# Patient Record
Sex: FEMALE | Race: WHITE | Employment: OTHER | URBAN - METROPOLITAN AREA
[De-identification: names, ages, dates, MRNs, and addresses within clinical notes are randomized per-mention and may not be internally consistent; named-entity substitution may affect disease eponyms.]

---

## 2020-11-17 ENCOUNTER — TELEPHONE (OUTPATIENT)
Dept: ADMINISTRATIVE | Age: 50
End: 2020-11-17

## 2020-11-18 ENCOUNTER — HOSPITAL ENCOUNTER (EMERGENCY)
Age: 50
Discharge: HOME | End: 2020-11-18
Payer: COMMERCIAL

## 2020-11-18 VITALS
HEIGHT: 67 IN | RESPIRATION RATE: 16 BRPM | DIASTOLIC BLOOD PRESSURE: 56 MMHG | TEMPERATURE: 97.8 F | BODY MASS INDEX: 38.61 KG/M2 | SYSTOLIC BLOOD PRESSURE: 127 MMHG | HEART RATE: 78 BPM | OXYGEN SATURATION: 97 % | WEIGHT: 246 LBS

## 2020-11-18 PROCEDURE — 99212 OFFICE O/P EST SF 10 MIN: CPT

## 2020-11-18 PROCEDURE — 99202 OFFICE O/P NEW SF 15 MIN: CPT | Performed by: NURSE PRACTITIONER

## 2020-11-18 RX ORDER — BENZTROPINE MESYLATE 0.5 MG/1
0.5 TABLET ORAL 2 TIMES DAILY
Status: ON HOLD | COMMUNITY
End: 2022-06-14 | Stop reason: SDUPTHER

## 2020-11-18 RX ORDER — ERYTHROMYCIN 500 MG/1
500 TABLET, COATED ORAL 4 TIMES DAILY
Qty: 28 TABLET | Refills: 0 | Status: ON HOLD | OUTPATIENT
Start: 2020-11-18 | End: 2020-11-23 | Stop reason: HOSPADM

## 2020-11-18 RX ORDER — ZIPRASIDONE HYDROCHLORIDE 40 MG/1
40 CAPSULE ORAL 2 TIMES DAILY WITH MEALS
Status: ON HOLD | COMMUNITY
End: 2020-11-23 | Stop reason: HOSPADM

## 2020-11-18 RX ORDER — TOPIRAMATE 25 MG/1
25 TABLET ORAL 2 TIMES DAILY
Status: ON HOLD | COMMUNITY
End: 2022-06-14 | Stop reason: HOSPADM

## 2020-11-18 RX ORDER — SERTRALINE HYDROCHLORIDE 100 MG/1
50 TABLET, FILM COATED ORAL DAILY
Status: ON HOLD | COMMUNITY
End: 2022-06-14 | Stop reason: HOSPADM

## 2020-11-18 ASSESSMENT — ENCOUNTER SYMPTOMS
ALLERGIC/IMMUNOLOGIC NEGATIVE: 1
SHORTNESS OF BREATH: 0
VOMITING: 0
SINUS PRESSURE: 0
EYE ITCHING: 0
STRIDOR: 0
COUGH: 1
NAUSEA: 0
CHEST TIGHTNESS: 0
WHEEZING: 0
EYE DISCHARGE: 0
ABDOMINAL PAIN: 0
SORE THROAT: 0
VOICE CHANGE: 0
EYE REDNESS: 0
TROUBLE SWALLOWING: 0
RHINORRHEA: 0

## 2020-11-18 ASSESSMENT — PAIN DESCRIPTION - LOCATION: LOCATION: EAR

## 2020-11-18 ASSESSMENT — PAIN SCALES - GENERAL: PAINLEVEL_OUTOF10: 6

## 2020-11-18 ASSESSMENT — PAIN DESCRIPTION - ORIENTATION: ORIENTATION: RIGHT

## 2020-11-18 NOTE — ED PROVIDER NOTES
Diagnosis Date    Cocaine abuse [facility])        SURGICAL HISTORY     Patient  has a past surgical history that includes Salpingo-oophorectomy and Dilation and curettage of uterus. CURRENT MEDICATIONS       Discharge Medication List as of 11/18/2020 11:42 AM      CONTINUE these medications which have NOT CHANGED    Details   topiramate (TOPAMAX) 25 MG tablet Take 25 mg by mouth 2 times dailyHistorical Med      ziprasidone (GEODON) 40 MG capsule Take 40 mg by mouth 2 times daily (with meals)Historical Med      benztropine (COGENTIN) 0.5 MG tablet Take 0.5 mg by mouth 2 times dailyHistorical Med      sertraline (ZOLOFT) 100 MG tablet Take 100 mg by mouth dailyHistorical Med      albuterol sulfate (PROAIR RESPICLICK) 517 (90 Base) MCG/ACT aerosol powder inhalation Inhale into the lungs every 4 hours as needed for Wheezing or Shortness of BreathHistorical Med             ALLERGIES     Patient is is allergic to pcn [penicillins]; sulfa antibiotics; and tegretol [carbamazepine]. FAMILY HISTORY     Patient'sfamily history is not on file. SOCIAL HISTORY     Patient  reports that she has been smoking cigarettes. She has never used smokeless tobacco. She reports previous alcohol use. She reports previous drug use. PHYSICAL EXAM     ED TRIAGE VITALS  BP: (!) 127/56, Temp: 97.8 °F (36.6 °C), Pulse: 78, Resp: 16, SpO2: 97 %  Physical Exam  Vitals signs and nursing note reviewed. Constitutional:       General: She is not in acute distress. Appearance: Normal appearance. She is well-developed and well-groomed. She is not ill-appearing, toxic-appearing or diaphoretic. HENT:      Head: Normocephalic and atraumatic. Right Ear: Hearing, ear canal and external ear normal. No decreased hearing noted. Tenderness present. No drainage or swelling. No middle ear effusion. There is no impacted cerumen. No mastoid tenderness. No hemotympanum. Tympanic membrane is erythematous and bulging.  Tympanic membrane is not injected or perforated. Left Ear: Hearing, tympanic membrane, ear canal and external ear normal.      Nose: Nose normal. No mucosal edema, congestion or rhinorrhea. Mouth/Throat:      Lips: Pink. No lesions. Mouth: Mucous membranes are moist. No oral lesions. Tongue: No lesions. Palate: No lesions. Pharynx: Oropharynx is clear. Uvula midline. No pharyngeal swelling, oropharyngeal exudate, posterior oropharyngeal erythema or uvula swelling. Tonsils: No tonsillar exudate or tonsillar abscesses. 2+ on the right. 2+ on the left. Eyes:      General: Lids are normal.         Right eye: No discharge. Left eye: No discharge. Conjunctiva/sclera: Conjunctivae normal.      Right eye: Right conjunctiva is not injected. Left eye: Left conjunctiva is not injected. Pupils: Pupils are equal.   Neck:      Musculoskeletal: Normal range of motion. Cardiovascular:      Rate and Rhythm: Normal rate and regular rhythm. Pulmonary:      Effort: Pulmonary effort is normal. No respiratory distress. Breath sounds: Normal breath sounds and air entry. No stridor, decreased air movement or transmitted upper airway sounds. No decreased breath sounds, wheezing, rhonchi or rales. Musculoskeletal:      Right knee: She exhibits normal range of motion. Left knee: She exhibits normal range of motion. Lymphadenopathy:      Head:      Right side of head: No submental, submandibular, tonsillar, preauricular or posterior auricular adenopathy. Left side of head: No submental, submandibular, tonsillar, preauricular or posterior auricular adenopathy. Cervical: Cervical adenopathy present. Right cervical: Deep cervical adenopathy present. No superficial or posterior cervical adenopathy. Left cervical: No superficial, deep or posterior cervical adenopathy. Skin:     General: Skin is warm and dry. Coloration: Skin is not pale. Findings: No rash. Comments: No obvious signs of infection or trauma. Neurological:      Mental Status: She is alert and oriented to person, place, and time. Sensory: No sensory deficit. Psychiatric:         Behavior: Behavior normal. Behavior is cooperative. DIAGNOSTIC RESULTS   Labs:  Abnormal Labs Reviewed - No data to display     IMAGING:  No orders to display     URGENT CARE COURSE:     Vitals:    11/18/20 1119   BP: (!) 127/56   Pulse: 78   Resp: 16   Temp: 97.8 °F (36.6 °C)   TempSrc: Infrared   SpO2: 97%   Weight: 246 lb (111.6 kg)   Height: 5' 6.5\" (1.689 m)       Medications - No data to display  PROCEDURES:  FINALIMPRESSION      1. Non-recurrent acute suppurative otitis media of right ear without spontaneous rupture of tympanic membrane        DISPOSITION/PLAN   DISPOSITION Decision To Discharge 11/18/2020 11:41:20 AM    Exam consistent with Right AOM, no rupture.   Pt to fu if not improving in 72 hours or if symptoms do not resolve  Pt requesting erythromycin as \"this is the only thing that works for her ear infections\"  Pt has been recently diagnosed with covid and states symptoms improving    Good handwashing, self quarantine at home, plenty of fluids, tylenol as needed for pain, fever           Problem List Items Addressed This Visit     None      Visit Diagnoses     Non-recurrent acute suppurative otitis media of right ear without spontaneous rupture of tympanic membrane    -  Primary    Relevant Medications    erythromycin base (E-MYCIN) 500 MG tablet          PATIENT REFERRED TO:  Nile Bonilla DO  [address] [place] [address], [address] [facility] [place] [address] [phone]    Schedule an appointment as soon as possible for a visit in 1 week  If no improvement of symptoms, For appointment       Иван Evans 167, APRN - CNP  11/18/20 5170

## 2020-11-18 NOTE — ED TRIAGE NOTES
Started 3 days ago with a right ear pain with drainage and right neck pain, pcp usually give erythromycin, was tested positive for covid has been sick since 11/4

## 2020-11-18 NOTE — ED NOTES
Pt. Released in stable condition, ambulated per self to private car. Instructed pt to follow-up with family doctor as needed for recheck or go directly to the emergency department for any concerns/worsening conditions. Pt. Verbalized understanding of instructions. No questions at this time. RX in hand.       Yariel Singh RN  11/18/20 7983

## 2020-11-19 ENCOUNTER — HOSPITAL ENCOUNTER (EMERGENCY)
Age: 50
Discharge: PSYCHIATRIC OR SUBSTANCE USE FACILITY | End: 2020-11-20
Attending: EMERGENCY MEDICINE
Payer: COMMERCIAL

## 2020-11-19 ENCOUNTER — TELEPHONE (OUTPATIENT)
Dept: FAMILY MEDICINE CLINIC | Age: 50
End: 2020-11-19

## 2020-11-19 LAB
ACETAMINOPHEN LEVEL: < 5 UG/ML (ref 0–20)
ALBUMIN SERPL-MCNC: 4.2 G/DL (ref 3.5–5.1)
ALP BLD-CCNC: 105 U/L (ref 38–126)
ALT SERPL-CCNC: 12 U/L (ref 11–66)
AMPHETAMINE+METHAMPHETAMINE URINE SCREEN: NEGATIVE
ANION GAP SERPL CALCULATED.3IONS-SCNC: 12 MEQ/L (ref 8–16)
AST SERPL-CCNC: 12 U/L (ref 5–40)
BACTERIA: ABNORMAL /HPF
BARBITURATE QUANTITATIVE URINE: NEGATIVE
BASOPHILS # BLD: 0.2 %
BASOPHILS ABSOLUTE: 0 THOU/MM3 (ref 0–0.1)
BENZODIAZEPINE QUANTITATIVE URINE: NEGATIVE
BILIRUB SERPL-MCNC: 0.2 MG/DL (ref 0.3–1.2)
BILIRUBIN URINE: NEGATIVE
BLOOD, URINE: NEGATIVE
BUN BLDV-MCNC: 13 MG/DL (ref 7–22)
CALCIUM SERPL-MCNC: 9.8 MG/DL (ref 8.5–10.5)
CANNABINOID QUANTITATIVE URINE: NEGATIVE
CASTS 2: ABNORMAL /LPF
CASTS UA: ABNORMAL /LPF
CHARACTER, URINE: ABNORMAL
CHLORIDE BLD-SCNC: 106 MEQ/L (ref 98–111)
CO2: 20 MEQ/L (ref 23–33)
COCAINE METABOLITE QUANTITATIVE URINE: NEGATIVE
COLOR: YELLOW
CREAT SERPL-MCNC: 1 MG/DL (ref 0.4–1.2)
CRYSTALS, UA: ABNORMAL
EOSINOPHIL # BLD: 1.3 %
EOSINOPHILS ABSOLUTE: 0.1 THOU/MM3 (ref 0–0.4)
EPITHELIAL CELLS, UA: ABNORMAL /HPF
ERYTHROCYTE [DISTWIDTH] IN BLOOD BY AUTOMATED COUNT: 13.6 % (ref 11.5–14.5)
ERYTHROCYTE [DISTWIDTH] IN BLOOD BY AUTOMATED COUNT: 45 FL (ref 35–45)
ETHYL ALCOHOL, SERUM: < 0.01 %
GFR SERPL CREATININE-BSD FRML MDRD: 58 ML/MIN/1.73M2
GLUCOSE BLD-MCNC: 111 MG/DL (ref 70–108)
GLUCOSE URINE: NEGATIVE MG/DL
HCT VFR BLD CALC: 42.6 % (ref 37–47)
HEMOGLOBIN: 14.8 GM/DL (ref 12–16)
IMMATURE GRANS (ABS): 0.03 THOU/MM3 (ref 0–0.07)
IMMATURE GRANULOCYTES: 0.4 %
KETONES, URINE: NEGATIVE
LEUKOCYTE ESTERASE, URINE: ABNORMAL
LYMPHOCYTES # BLD: 26.6 %
LYMPHOCYTES ABSOLUTE: 2.2 THOU/MM3 (ref 1–4.8)
MAGNESIUM: 1.9 MG/DL (ref 1.6–2.4)
MCH RBC QN AUTO: 31.6 PG (ref 26–33)
MCHC RBC AUTO-ENTMCNC: 34.7 GM/DL (ref 32.2–35.5)
MCV RBC AUTO: 90.8 FL (ref 81–99)
MISCELLANEOUS 2: ABNORMAL
MONOCYTES # BLD: 7.7 %
MONOCYTES ABSOLUTE: 0.6 THOU/MM3 (ref 0.4–1.3)
NITRITE, URINE: NEGATIVE
NUCLEATED RED BLOOD CELLS: 0 /100 WBC
OPIATES, URINE: NEGATIVE
OSMOLALITY CALCULATION: 276.5 MOSMOL/KG (ref 275–300)
OXYCODONE: NEGATIVE
PH UA: 5.5 (ref 5–9)
PHENCYCLIDINE QUANTITATIVE URINE: NEGATIVE
PLATELET # BLD: 264 THOU/MM3 (ref 130–400)
PMV BLD AUTO: 10.3 FL (ref 9.4–12.4)
POTASSIUM SERPL-SCNC: 4.2 MEQ/L (ref 3.5–5.2)
PROTEIN UA: NEGATIVE
RBC # BLD: 4.69 MILL/MM3 (ref 4.2–5.4)
RBC URINE: ABNORMAL /HPF
RENAL EPITHELIAL, UA: ABNORMAL
SALICYLATE, SERUM: 0.3 MG/DL (ref 2–10)
SARS-COV-2, NAAT: NOT DETECTED
SEG NEUTROPHILS: 63.8 %
SEGMENTED NEUTROPHILS ABSOLUTE COUNT: 5.4 THOU/MM3 (ref 1.8–7.7)
SODIUM BLD-SCNC: 138 MEQ/L (ref 135–145)
SPECIFIC GRAVITY, URINE: 1.01 (ref 1–1.03)
TOTAL PROTEIN: 6.6 G/DL (ref 6.1–8)
TSH SERPL DL<=0.05 MIU/L-ACNC: 2.06 UIU/ML (ref 0.4–4.2)
UROBILINOGEN, URINE: 0.2 EU/DL (ref 0–1)
WBC # BLD: 8.4 THOU/MM3 (ref 4.8–10.8)
WBC UA: ABNORMAL /HPF
YEAST: ABNORMAL

## 2020-11-19 PROCEDURE — G0480 DRUG TEST DEF 1-7 CLASSES: HCPCS

## 2020-11-19 PROCEDURE — 81001 URINALYSIS AUTO W/SCOPE: CPT

## 2020-11-19 PROCEDURE — U0002 COVID-19 LAB TEST NON-CDC: HCPCS

## 2020-11-19 PROCEDURE — 36415 COLL VENOUS BLD VENIPUNCTURE: CPT

## 2020-11-19 PROCEDURE — 83735 ASSAY OF MAGNESIUM: CPT

## 2020-11-19 PROCEDURE — 87086 URINE CULTURE/COLONY COUNT: CPT

## 2020-11-19 PROCEDURE — 80053 COMPREHEN METABOLIC PANEL: CPT

## 2020-11-19 PROCEDURE — 80307 DRUG TEST PRSMV CHEM ANLYZR: CPT

## 2020-11-19 PROCEDURE — 99285 EMERGENCY DEPT VISIT HI MDM: CPT

## 2020-11-19 PROCEDURE — 85025 COMPLETE CBC W/AUTO DIFF WBC: CPT

## 2020-11-19 PROCEDURE — 84443 ASSAY THYROID STIM HORMONE: CPT

## 2020-11-19 ASSESSMENT — SLEEP AND FATIGUE QUESTIONNAIRES
DIFFICULTY FALLING ASLEEP: YES
AVERAGE NUMBER OF SLEEP HOURS: 0
DIFFICULTY ARISING: YES
DIFFICULTY STAYING ASLEEP: YES
DO YOU HAVE DIFFICULTY SLEEPING: YES
RESTFUL SLEEP: NO
SLEEP PATTERN: INSOMNIA
DO YOU USE A SLEEP AID: NO

## 2020-11-19 ASSESSMENT — PATIENT HEALTH QUESTIONNAIRE - PHQ9: SUM OF ALL RESPONSES TO PHQ QUESTIONS 1-9: 18

## 2020-11-20 ENCOUNTER — HOSPITAL ENCOUNTER (INPATIENT)
Age: 50
LOS: 3 days | Discharge: HOME | DRG: 885 | End: 2020-11-23
Attending: PSYCHIATRY & NEUROLOGY | Admitting: PSYCHIATRY & NEUROLOGY
Payer: COMMERCIAL

## 2020-11-20 VITALS
DIASTOLIC BLOOD PRESSURE: 78 MMHG | HEART RATE: 52 BPM | RESPIRATION RATE: 19 BRPM | HEIGHT: 66 IN | OXYGEN SATURATION: 97 % | TEMPERATURE: 98.5 F | SYSTOLIC BLOOD PRESSURE: 134 MMHG | WEIGHT: 245 LBS | BODY MASS INDEX: 39.37 KG/M2

## 2020-11-20 PROBLEM — F25.9 SCHIZOAFFECTIVE DISORDER (HCC): Status: ACTIVE | Noted: 2020-11-20

## 2020-11-20 PROCEDURE — 1240000000 HC EMOTIONAL WELLNESS R&B

## 2020-11-20 PROCEDURE — 93005 ELECTROCARDIOGRAM TRACING: CPT

## 2020-11-20 PROCEDURE — 99223 1ST HOSP IP/OBS HIGH 75: CPT | Performed by: PSYCHIATRY & NEUROLOGY

## 2020-11-20 RX ORDER — MAGNESIUM HYDROXIDE/ALUMINUM HYDROXICE/SIMETHICONE 120; 1200; 1200 MG/30ML; MG/30ML; MG/30ML
30 SUSPENSION ORAL EVERY 6 HOURS PRN
Status: DISCONTINUED | OUTPATIENT
Start: 2020-11-20 | End: 2020-11-23 | Stop reason: HOSPADM

## 2020-11-20 RX ORDER — NICOTINE 21 MG/24HR
1 PATCH, TRANSDERMAL 24 HOURS TRANSDERMAL DAILY
Status: DISCONTINUED | OUTPATIENT
Start: 2020-11-20 | End: 2020-11-23 | Stop reason: HOSPADM

## 2020-11-20 RX ORDER — TOPIRAMATE 25 MG/1
25 TABLET ORAL 2 TIMES DAILY
Status: DISCONTINUED | OUTPATIENT
Start: 2020-11-20 | End: 2020-11-23 | Stop reason: HOSPADM

## 2020-11-20 RX ORDER — HYDROXYZINE 50 MG/1
50 TABLET, FILM COATED ORAL 3 TIMES DAILY PRN
Status: DISCONTINUED | OUTPATIENT
Start: 2020-11-20 | End: 2020-11-23 | Stop reason: HOSPADM

## 2020-11-20 RX ORDER — ACETAMINOPHEN 325 MG/1
650 TABLET ORAL EVERY 4 HOURS PRN
Status: DISCONTINUED | OUTPATIENT
Start: 2020-11-20 | End: 2020-11-23 | Stop reason: HOSPADM

## 2020-11-20 RX ORDER — SERTRALINE HYDROCHLORIDE 100 MG/1
100 TABLET, FILM COATED ORAL DAILY
Status: DISCONTINUED | OUTPATIENT
Start: 2020-11-20 | End: 2020-11-23 | Stop reason: HOSPADM

## 2020-11-20 RX ORDER — POLYETHYLENE GLYCOL 3350 17 G/17G
17 POWDER, FOR SOLUTION ORAL DAILY PRN
Status: DISCONTINUED | OUTPATIENT
Start: 2020-11-20 | End: 2020-11-23 | Stop reason: HOSPADM

## 2020-11-20 RX ORDER — ALBUTEROL SULFATE 2.5 MG/3ML
2.5 SOLUTION RESPIRATORY (INHALATION) EVERY 6 HOURS PRN
Status: DISCONTINUED | OUTPATIENT
Start: 2020-11-20 | End: 2020-11-23 | Stop reason: HOSPADM

## 2020-11-20 RX ORDER — BENZTROPINE MESYLATE 0.5 MG/1
0.5 TABLET ORAL 2 TIMES DAILY
Status: DISCONTINUED | OUTPATIENT
Start: 2020-11-20 | End: 2020-11-23 | Stop reason: HOSPADM

## 2020-11-20 RX ORDER — ZIPRASIDONE HYDROCHLORIDE 40 MG/1
40 CAPSULE ORAL 2 TIMES DAILY WITH MEALS
Status: DISCONTINUED | OUTPATIENT
Start: 2020-11-20 | End: 2020-11-23 | Stop reason: HOSPADM

## 2020-11-20 RX ORDER — TRAZODONE HYDROCHLORIDE 50 MG/1
50 TABLET ORAL NIGHTLY PRN
Status: DISCONTINUED | OUTPATIENT
Start: 2020-11-20 | End: 2020-11-23 | Stop reason: HOSPADM

## 2020-11-20 RX ORDER — IBUPROFEN 400 MG/1
400 TABLET ORAL EVERY 6 HOURS PRN
Status: DISCONTINUED | OUTPATIENT
Start: 2020-11-20 | End: 2020-11-23 | Stop reason: HOSPADM

## 2020-11-20 ASSESSMENT — ENCOUNTER SYMPTOMS
COUGH: 0
DIARRHEA: 0
CHOKING: 0
TROUBLE SWALLOWING: 0
BACK PAIN: 0
WHEEZING: 0
EYE DISCHARGE: 0
EYE REDNESS: 0
BLOOD IN STOOL: 0
VOMITING: 0
EYE PAIN: 0
RHINORRHEA: 0
SORE THROAT: 0
PHOTOPHOBIA: 0
NAUSEA: 0
CHEST TIGHTNESS: 0
CONSTIPATION: 0
ABDOMINAL DISTENTION: 0
ABDOMINAL PAIN: 0
EYE ITCHING: 0
SHORTNESS OF BREATH: 0
SINUS PRESSURE: 0
VOICE CHANGE: 0

## 2020-11-20 ASSESSMENT — SLEEP AND FATIGUE QUESTIONNAIRES
AVERAGE NUMBER OF SLEEP HOURS: 1
DO YOU HAVE DIFFICULTY SLEEPING: YES
RESTFUL SLEEP: NO
DO YOU USE A SLEEP AID: NO
DIFFICULTY FALLING ASLEEP: YES
DIFFICULTY ARISING: NO
SLEEP PATTERN: INSOMNIA
DIFFICULTY STAYING ASLEEP: YES

## 2020-11-20 ASSESSMENT — PAIN SCALES - GENERAL: PAINLEVEL_OUTOF10: 0

## 2020-11-20 ASSESSMENT — LIFESTYLE VARIABLES
HISTORY_ALCOHOL_USE: NO
HISTORY_ALCOHOL_USE: NO

## 2020-11-20 ASSESSMENT — PAIN - FUNCTIONAL ASSESSMENT: PAIN_FUNCTIONAL_ASSESSMENT: 0-10

## 2020-11-20 ASSESSMENT — PATIENT HEALTH QUESTIONNAIRE - PHQ9: SUM OF ALL RESPONSES TO PHQ QUESTIONS 1-9: 16

## 2020-11-20 NOTE — ED NOTES
ED nurse-to-nurse bedside report    Chief Complaint   Patient presents with    Suicidal      LOC: alert and orientated to name, place, date  Vital signs   Vitals:    11/19/20 2144   BP: 111/67   Pulse: 71   Resp: 15   Temp: 98.5 °F (36.9 °C)   TempSrc: Oral   SpO2: 97%   Weight: 246 lb (111.6 kg)   Height: 5' 6\" (1.676 m)      Pain:  0  Pain Interventions: comfort  Pain Goal: 0  Oxygen: NA    Current needs required none   Telemetry: NA  LDAs:    Continuous Infusions:   Mobility: Independent  Rodrigues Fall Risk Score: No flowsheet data found.   Fall Interventions: call light in reach  Report given to: Andrez [facility] ([place], [place]) Nupur, RN  11/19/20 2332

## 2020-11-20 NOTE — ED NOTES
ED nurse-to-nurse bedside report    Chief Complaint   Patient presents with    Suicidal      LOC: alert and orientated to name, place, date  Vital signs   Vitals:    11/19/20 2144 11/20/20 0213 11/20/20 0626   BP: 111/67 105/68 134/78   Pulse: 71 65 52   Resp: 15 16 19   Temp: 98.5 °F (36.9 °C)  98.5 °F (36.9 °C)   TempSrc: Oral  Oral   SpO2: 97% 95% 97%   Weight: 246 lb (111.6 kg)  245 lb (111.1 kg)   Height: 5' 6\" (1.676 m)        Pain:    Pain Interventions: none  Pain Goal: 0  Oxygen: No    Current needs required room air    Telemetry: No  LDAs:    Continuous Infusions:   Mobility: Independent  Rodrigues Fall Risk Score: No flowsheet data found.   Fall Interventions: side rails up bed locked and in lowest position call light in reach   Report given to: Jing Huerta RN  11/20/20 0822

## 2020-11-20 NOTE — ED NOTES
Bed: 017A  Expected date:   Expected time:   Means of arrival:   Comments:  FABY PT LEFT AT 2133       Beck MOONEY RN  11/19/20 2139

## 2020-11-20 NOTE — GROUP NOTE
Group Therapy Note    Date: 11/20/2020    Group Start Time: 1430  Group End Time: 1525  Group Topic: Cognitive Skills    MATT Mccurdy, CTRS        Group Therapy Note    Attendees: 4/18       Pt did not participate in Cognitive Skills Group at 1430 when encouraged by RT due to resting in room. Pt was offered talk time as an alternative to group but declined.          Discipline Responsible: Psychoeducational Specialist        Signature:  Alva Abarca

## 2020-11-20 NOTE — ED NOTES
Presents to ER via EMS from [facility]. EMS states pt was sent to ER due to being COVID+. Pt states she is receiving care for suicidal ideation but tested positive for COVID19 from a test she received Tuesday. PT denies any COVID symptoms. Level A paged. Will continue to monitor.      Sohail Townsend RN  11/19/20 6951
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
## 2020-11-20 NOTE — PROGRESS NOTES
02:25 Call received from [facility] reporting they are reaching out to [facility] for referral. . Information is provided to Dr. Nicki Garvin  Patient is accepted to the care of Dr. Nicki Garvin. Copy of voluntary form is faxed to [facility]. 04:52 Call received from [facility]. Patient will be going to [address] [place] [address] . Nurse to Nurse Number [phone]. MARLENI Chao provided updated information.

## 2020-11-20 NOTE — CARE COORDINATION
SOCIAL SERVICE NOTE:   Pt has been staying at [facility] in [place], [place] , receiving AOD and [address] treatment there, CRIS telephones Gabriella Valdovinos [phone] on this date, per  patient request CRIS verifies that PT is on waiting list to be transferred to [address] in [place], but will need to return to [facility] at discharge.

## 2020-11-20 NOTE — ED NOTES
Pt in bed side rails up x2. Given blanket for comfort. Denies other needs at this time. [place] police monitoring, will continue to monitor.       Genevieve Connolly  11/20/20 0016

## 2020-11-20 NOTE — PROGRESS NOTES
Provisional Diagnosis:   Schizoaffective Disorder per history. Substance Use Disorder      Risk, Psychosocial and Contextual Factors:  Limited social support. In recovery from substance. Current  Treatment: [facility]. Present Suicidal Behavior:      Verbal: xxxx         Attempt: Denies    Access to Weapons:  Denies    Current Suicide Risk: Low, Moderate or High:   Moderate     Past Suicidal Behavior:       Verbal: xxx    Attempt: xxx    Self-Injurious/Self-Mutilation: History of cutting. Traumatic Event Within Past 2 Weeks:   Denies    Current Abuse:  Denies    Legal: xxx Substance related. Violence: Denies    Protective Factors: Active care for mental health/substance use. Housing:  Resides at the [facility]. CPAP/Oxygen/Ambulation Difficulties: na    Basic Vital Signs Normal?: Check with Patients Nurse prior to Calling Psychiatry    Critical Labs?: Check with Patients Nurse prior to Calling Psychiatry    Clinical Summary:      Patient is a [de-identified] year old female presenting to [address] [place] [address] by yasmani after presenting to [facility] from [facility]. Patient was a recent admit to residential care at [facility] for mental health/RACHELLE treatment. Patient reports she is from [employer] and reached ou to the [facility] for treatment. Patient reports she has been in their care for the past three weeks. Patient reports loss of interests, motivation and lack of sleep. Patient reports she has command auditory hallucinations telling her to kill self. Patient reports she has not  taken her medication or ate in the last two days because 'someone is trying to poison me'  Patient reports when she first arrived for treatment she felt others were having thoughts to go against her. Collateral information received from Jocelyn Decker with [facility] at [phone]    23:30 Patient is awake, cooperative. 00:30 Patient is awake, cooperative.    01:30 Patient is awake, ALTON waiting on return call from [facility] concerning [id number].   02:30 Patient is resting, Monitored by [employer]  69:48 Patient is resting, Monitored by [employer]  22:63 Patient is resting. Monitored by [employer]    Level of Care Disposition:      Consulted with Dr. Darren Huitron concerning the mental status Patient is medically clear for psyche. Consulted with patients RN about abnormalities or medical concerns. No abnormalities or medical concerns noted. Consulted with Dr. eJimy Prado. Patient is referred to inpatient care for mental health/substance use treatment. Referral to [facility]. Spoke with Jim Jalloh RN. Referral to Andrei [facility].

## 2020-11-20 NOTE — ED PROVIDER NOTES
[facility], [place]  eMERGENCY dEPARTMENT eNCOUnter          CHIEF COMPLAINT       Chief Complaint   Patient presents with    Suicidal       Nurses Notes reviewed and I agree except as noted in the HPI. HISTORY OF PRESENT ILLNESS    Jenae Harrington is a 47 y.o. female who presents hearing voices and wanting to hurt herself. Apparently she comes from [address] [place] [address] which is a facility that handles both psychiatric and drug abuse problems. She is in their intensive rehab. She is here today because she is not taking her medications the voices are telling her not to take her medication. She also states that she is hearing the voices tell her to cut herself. Patient denies any drugs or alcohol. Patient denies any self injury today. Currently the patient is resting comfortably on the cot no apparent distress. REVIEW OF SYSTEMS     Review of Systems   Constitutional: Negative for activity change, appetite change, diaphoresis, fatigue and unexpected weight change. HENT: Negative for congestion, ear discharge, ear pain, hearing loss, rhinorrhea, sinus pressure, sore throat, trouble swallowing and voice change. Eyes: Negative for photophobia, pain, discharge, redness and itching. Respiratory: Negative for cough, choking, chest tightness, shortness of breath and wheezing. Cardiovascular: Negative for chest pain, palpitations and leg swelling. Gastrointestinal: Negative for abdominal distention, abdominal pain, blood in stool, constipation, diarrhea, nausea and vomiting. Endocrine: Negative for polydipsia, polyphagia and polyuria. Genitourinary: Negative for decreased urine volume, difficulty urinating, dysuria, enuresis, frequency, hematuria and urgency. Musculoskeletal: Negative for arthralgias, back pain, gait problem, myalgias, neck pain and neck stiffness. Skin: Negative for pallor and rash. Allergic/Immunologic: Negative for immunocompromised state.    Neurological: Negative for dizziness, tremors, seizures, syncope, facial asymmetry, weakness, light-headedness, numbness and headaches. Hematological: Negative for adenopathy. Does not bruise/bleed easily. Psychiatric/Behavioral: Positive for behavioral problems, hallucinations and self-injury. Negative for agitation and suicidal ideas. The patient is nervous/anxious. PAST MEDICAL HISTORY    has a past medical history of Cocaine abuse ([facility] [address] SURGICAL HISTORY      has a past surgical history that includes Salpingo-oophorectomy and Dilation and curettage of uterus. CURRENT MEDICATIONS       Previous Medications    ALBUTEROL SULFATE (PROAIR RESPICLICK) 459 (90 BASE) MCG/ACT AEROSOL POWDER INHALATION    Inhale into the lungs every 4 hours as needed for Wheezing or Shortness of Breath    BENZTROPINE (COGENTIN) 0.5 MG TABLET    Take 0.5 mg by mouth 2 times daily    ERYTHROMYCIN BASE (E-MYCIN) 500 MG TABLET    Take 1 tablet by mouth 4 times daily for 7 days    SERTRALINE (ZOLOFT) 100 MG TABLET    Take 100 mg by mouth daily    TOPIRAMATE (TOPAMAX) 25 MG TABLET    Take 25 mg by mouth 2 times daily    ZIPRASIDONE (GEODON) 40 MG CAPSULE    Take 40 mg by mouth 2 times daily (with meals)       ALLERGIES     is allergic to pcn [penicillins]; sulfa antibiotics; and tegretol [carbamazepine]. FAMILY HISTORY     has no family status information on file. family history is not on file. SOCIAL HISTORY      reports that she has been smoking cigarettes. She has never used smokeless tobacco. She reports previous alcohol use. She reports previous drug use. PHYSICAL EXAM     INITIAL VITALS:  height is 5' 6\" (1.676 m) and weight is 246 lb (111.6 kg). Her oral temperature is 98.5 °F (36.9 °C). Her blood pressure is 105/68 and her pulse is 65. Her respiration is 16 and oxygen saturation is 95%. Physical Exam  Vitals signs and nursing note reviewed. Constitutional:       General: She is not in acute distress.      Appearance: She is well-developed. She is not diaphoretic. HENT:      Head: Normocephalic and atraumatic. Right Ear: External ear normal.      Left Ear: External ear normal.      Nose: Nose normal.      Mouth/Throat:      Pharynx: No oropharyngeal exudate. Eyes:      General: No scleral icterus. Right eye: No discharge. Left eye: No discharge. Conjunctiva/sclera: Conjunctivae normal.      Pupils: Pupils are equal, round, and reactive to light. Neck:      Musculoskeletal: Normal range of motion and neck supple. Thyroid: No thyromegaly. Vascular: No JVD. Trachea: No tracheal deviation. Cardiovascular:      Rate and Rhythm: Normal rate and regular rhythm. Heart sounds: Normal heart sounds, S1 normal and S2 normal. No murmur. No friction rub. No gallop. Pulmonary:      Effort: Pulmonary effort is normal.      Breath sounds: Normal breath sounds. No stridor. No wheezing, rhonchi or rales. Chest:      Chest wall: No tenderness. Abdominal:      General: Bowel sounds are normal. There is no distension. Palpations: Abdomen is soft. There is no mass. Tenderness: There is no abdominal tenderness. There is no guarding or rebound. Hernia: No hernia is present. Musculoskeletal: Normal range of motion. General: No tenderness. Lymphadenopathy:      Cervical: No cervical adenopathy. Skin:     General: Skin is warm and dry. Findings: No bruising, ecchymosis, lesion or rash. Neurological:      Mental Status: She is alert and oriented to person, place, and time. Cranial Nerves: No cranial nerve deficit. Coordination: Coordination normal.      Deep Tendon Reflexes: Reflexes are normal and symmetric. Psychiatric:         Attention and Perception: She perceives auditory hallucinations. Mood and Affect: Affect is flat. Speech: Speech is delayed. Behavior: Behavior is slowed. Thought Content:  Thought content is delusional.         Cognition and Memory: Cognition normal.         Judgment: Judgment normal.           DIFFERENTIAL DIAGNOSIS:   Substance-induced mood disorder, schizophrenia, shad, depression    DIAGNOSTIC RESULTS     EKG: All EKG's are interpreted by the Emergency Department Physician who either signs or Co-signs this chart in the absence of a cardiologist.  None    RADIOLOGY: non-plain film images(s) such as CT, Ultrasound and MRI are read by the radiologist.  No orders to display         LABS:   Labs Reviewed   COMPREHENSIVE METABOLIC PANEL - Abnormal; Notable for the following components:       Result Value    Glucose 111 (*)     CO2 20 (*)     Total Bilirubin 0.2 (*)     All other components within normal limits   SALICYLATE LEVEL - Abnormal; Notable for the following components:    Salicylate, Serum 0.3 (*)     All other components within normal limits   URINE WITH REFLEXED MICRO - Abnormal; Notable for the following components:    Leukocyte Esterase, Urine MODERATE (*)     Character, Urine CLOUDY (*)     All other components within normal limits   GLOMERULAR FILTRATION RATE, ESTIMATED - Abnormal; Notable for the following components:    Est, Glom Filt Rate 58 (*)     All other components within normal limits   CULTURE, REFLEXED, URINE    Narrative:     Source: urine       Site: unknown collect          Current Antibiotics: not stated   CBC WITH AUTO DIFFERENTIAL   MAGNESIUM   TSH WITHOUT REFLEX   ETHANOL   ACETAMINOPHEN LEVEL   URINE DRUG SCREEN   COVID-19   ANION GAP   OSMOLALITY       EMERGENCY DEPARTMENT COURSE:   Vitals:    Vitals:    11/19/20 2144 11/20/20 0213   BP: 111/67 105/68   Pulse: 71 65   Resp: 15 16   Temp: 98.5 °F (36.9 °C)    TempSrc: Oral    SpO2: 97% 95%   Weight: 246 lb (111.6 kg)    Height: 5' 6\" (1.676 m)      I went and assessed the bedside appropriate labs were ordered. COVID-19 was ordered. Behavioral health went and assessed the patient.   I reviewed all the labs and call behavioral health. Patient is cleared medically from current psychiatric complaint. Behavioral health called psychiatry. Psychiatry agreed that the patient needed to be admitted. He calls were placed out, patient was accepted by Dr. Yony Paredes, at [facility]. Patient remained hemodynamically stable throughout course. Patient is transferred in stable condition pending further evaluation and treatment. Patient is cleared medically from current psychiatric complaint. CRITICAL CARE:   None    CONSULTS:  Behavioral health    PROCEDURES:  None    FINAL IMPRESSION      1. Schizoaffective disorder, bipolar type ([facility] [address]          DISPOSITION/PLAN   Transfer-[facility]    PATIENT REFERRED TO:  No follow-up provider specified.     DISCHARGE MEDICATIONS:  New Prescriptions    No medications on file       (Please note that portions of this note were completed with a voice recognition program.  Efforts were made to edit the dictations but occasionally words are mis-transcribed.)    Tonny Plascencia, DO Dayanna Keene, DO  11/20/20 1051 Sierra Burton, DO  11/20/20 0655

## 2020-11-20 NOTE — PLAN OF CARE
[facility]  Initial Interdisciplinary Treatment Plan NO      Original treatment plan Date & Time: 11/20/2020 1430    Admission Type:  Admission Type: Voluntary    Reason for admission:   Reason for Admission: auditory hallucinations, patient reports she was wanting to cut herself    Estimated Length of Stay:  5-7days  Estimated Discharge Date: to be determined by physician    PATIENT STRENGTHS:  Patient Strengths:Strengths: Connection to output provider, Communication  Patient Strengths and Limitations:Limitations: Difficult relationships / poor social skills  Addictive Behavior: Addictive Behavior  In the past 3 months, have you felt or has someone told you that you have a problem with:  : None  Do you have a history of Chemical Use?: No  Do you have a history of Alcohol Use?: No  Do you have a history of Street Drug Abuse?: No  Histroy of Prescripton Drug Abuse?: No  Medical Problems:  Past Medical History:   Diagnosis Date    Cocaine abuse ([facility] [address]      Status EXAM:Status and Exam  Normal: No  Facial Expression: Flat  Affect: Blunt  Level of Consciousness: Alert  Mood:Normal: No  Mood: Depressed, Anxious  Motor Activity:Normal: Yes  Interview Behavior: Cooperative  Preception: [place] to Person, [place] to Time, [place] to Place, [place] to Situation  Attention:Normal: No  Attention: Distractible  Thought Processes: Blocking  Thought Content:Normal: No  Thought Content: Paranoia  Hallucinations:  Auditory (Comment)  Delusions: Yes  Delusions: Persecution  Memory:Normal: Yes  Insight and Judgment: No  Insight and Judgment: Poor Judgment, Poor Insight  Present Suicidal Ideation: No  Present Homicidal Ideation: No    EDUCATION:   Learner Progress Toward Treatment Goals: reviewed group plans and strategies for care    Method:group therapy, medication compliance, individualized assessments and care planning    Outcome: needs reinforcement    PATIENT GOALS: to be discussed with patient within 67 hours    PLAN/TREATMENT RECOMMENDATIONS:     continue group therapy , medications compliance, goal setting, individualized assessments and care, continue to monitor pt on unit      SHORT-TERM GOALS:   Time frame for Short-Term Goals: 5-7 days    LONG-TERM GOALS:  Time frame for Long-Term Goals: 6 months  Members Present in Team Meeting: See Signature Sheet    Richi Negrete

## 2020-11-20 NOTE — BH NOTE
Patient given tobacco quitline number 40017775437 at this time, refusing to call at this time, states \" I just dont want to quit now\"- patient given information as to the dangers of long term tobacco use. Continue to reinforce the importance of tobacco cessation.

## 2020-11-20 NOTE — ED NOTES
Upon first contact with patient this RN receives bedside shift report from [facility]. Pt resting on cot with easy respirations and constant observer at bedside.         Hortencia SCHMITZ RN  11/19/20 9437

## 2020-11-20 NOTE — GROUP NOTE
Group Therapy Note    Date: 11/20/2020    Group Start Time: 1330  Group End Time: 1400  Group Topic: Relaxation    [facility] D    Yazmin Woo    Patient refused to attend relaxation/ coping skills group at 1330  after encouragement from staff. 1:1 talk time provided by staff.      Signature:  Yazmin Woo

## 2020-11-20 NOTE — CARE COORDINATION
BHI Biopsychosocial Assessment    Current Level of Psychosocial Functioning     Independent   Dependent  X   Minimal Assist     Psychosocial High Risk Factors (check all that apply)    Unable to obtain meds   Chronic illness/pain     Substance abuse X Past history of Cocaine abuse, has been clean since October  Lack of Family Support   Financial stress    Isolation X  Inadequate Community Resources   Suicide attempt(s)   Not taking medications X  Victim of crime   Developmental Delay  Unable to manage personal needs X    Age 71 or older   Homeless  No transportation   Readmission within 30 days  Unemployment  Traumatic Event    Psychiatric Advanced Directives: none reported     Family to Involve in Treatment: PT reports that her father is supportive and involved in her care. Sexual Orientation:  VITALY    Patient Strengths: has been living at inpatient sober living program, SSI income, insurance      Patient Barriers: Pt reports that she has been off of her medications for 2 days, presents on admission reporting hearing voices that are telling her to stop taking her medications and to hurt herself. Opiate Education Provided: N/A PT denies and does not have a documented history of Opiate or Heroin use/abuse. PT reports a history of Cocaine abuse and reports being clean and sober since Oct 12, 2020. CMHC/mental health history: Pt has been receiving mental health and substance abuse treatment at [facility] living facility in [facility]. PT reports past crack/cocaine abuse and reports being clean and sober since Oct 2020. PT reports that she has been working with staff at [facility] regarding a transfer to their facility in [place] and reports being on a waiting list for transfer. PT reports past inpatient Psychiatric hospitalizations at the [facility] system.      Plan of Care   medication management, group/individual therapies, family meetings, psycho -education, treatment team meetings to assist with stabilization, referral to community resources. Initial Discharge Plan:  PT reports a plan to return to sober living [facility] in [place], [place] and to work towards transferring to [facility] when a space is available. Safety Plan: writer initiates safety Plan at time of assessment  nd will be reviewed again in a group setting. Clinical Summary:  Patient is a 47year old female who presents on admission reporting auditory hallucinations. PT reports that she is hearing voices telling her to stop taking her medications and to hurt herself. PT was transferred from [address] [place] [address]. Pt has been receiving mental health and substance abuse treatment at [facility] in [place], [place] since October 2020. PT reports past crack/cocaine abuse and reports being clean and sober since Oct  12 2020. PT reports that she has been working with staff at [facility] regarding a transfer to their facility in [place], [place] and reports being on a waiting list for transfer. PT reports past inpatient Psychiatric hospitalizations at the [facility] system. Patient reports loss of interests, motivation and lack of sleep. Patient reports she has command auditory hallucinations telling her to hurt herself. Patient reports she has not taken her medication or eaten anything  in the last two days because 'someone is trying to poison me'  Patient reports when she first arrived for treatment she felt others were having thoughts to go against her.  Pt presents with a history of Schizoaffective Disorder.

## 2020-11-21 PROBLEM — N30.00 ACUTE CYSTITIS WITHOUT HEMATURIA: Status: ACTIVE | Noted: 2020-11-21

## 2020-11-21 PROBLEM — H92.01 RIGHT EAR PAIN: Status: ACTIVE | Noted: 2020-11-21

## 2020-11-21 LAB
ORGANISM: ABNORMAL
URINE CULTURE REFLEX: ABNORMAL

## 2020-11-21 PROCEDURE — 1240000000 HC EMOTIONAL WELLNESS R&B

## 2020-11-21 PROCEDURE — 6370000000 HC RX 637 (ALT 250 FOR IP): Performed by: INTERNAL MEDICINE

## 2020-11-21 PROCEDURE — 6370000000 HC RX 637 (ALT 250 FOR IP): Performed by: PSYCHIATRY & NEUROLOGY

## 2020-11-21 PROCEDURE — 99232 SBSQ HOSP IP/OBS MODERATE 35: CPT | Performed by: PSYCHIATRY & NEUROLOGY

## 2020-11-21 PROCEDURE — 99221 1ST HOSP IP/OBS SF/LOW 40: CPT | Performed by: INTERNAL MEDICINE

## 2020-11-21 RX ORDER — ERYTHROMYCIN 250 MG/1
500 CAPSULE, DELAYED RELEASE ORAL EVERY 6 HOURS
Status: DISCONTINUED | OUTPATIENT
Start: 2020-11-21 | End: 2020-11-22

## 2020-11-21 RX ORDER — CIPROFLOXACIN 250 MG/1
250 TABLET, FILM COATED ORAL EVERY 12 HOURS SCHEDULED
Status: DISCONTINUED | OUTPATIENT
Start: 2020-11-21 | End: 2020-11-23 | Stop reason: HOSPADM

## 2020-11-21 RX ADMIN — ZIPRASIDONE HYDROCHLORIDE 40 MG: 40 CAPSULE ORAL at 17:30

## 2020-11-21 RX ADMIN — ERYTHROMYCIN 500 MG: 250 CAPSULE, DELAYED RELEASE PELLETS ORAL at 17:30

## 2020-11-21 RX ADMIN — TOPIRAMATE 25 MG: 25 TABLET, FILM COATED ORAL at 09:28

## 2020-11-21 RX ADMIN — IBUPROFEN 400 MG: 400 TABLET, FILM COATED ORAL at 04:33

## 2020-11-21 RX ADMIN — IBUPROFEN 400 MG: 400 TABLET, FILM COATED ORAL at 16:24

## 2020-11-21 RX ADMIN — CIPROFLOXACIN HYDROCHLORIDE 250 MG: 250 TABLET, FILM COATED ORAL at 22:01

## 2020-11-21 RX ADMIN — BENZTROPINE MESYLATE 0.5 MG: 0.5 TABLET ORAL at 09:28

## 2020-11-21 RX ADMIN — TOPIRAMATE 25 MG: 25 TABLET, FILM COATED ORAL at 22:01

## 2020-11-21 RX ADMIN — ERYTHROMYCIN 500 MG: 250 CAPSULE, DELAYED RELEASE PELLETS ORAL at 13:24

## 2020-11-21 RX ADMIN — ZIPRASIDONE HYDROCHLORIDE 40 MG: 40 CAPSULE ORAL at 09:28

## 2020-11-21 RX ADMIN — SERTRALINE HYDROCHLORIDE 100 MG: 100 TABLET ORAL at 09:28

## 2020-11-21 RX ADMIN — BENZTROPINE MESYLATE 0.5 MG: 0.5 TABLET ORAL at 22:01

## 2020-11-21 ASSESSMENT — PAIN - FUNCTIONAL ASSESSMENT
PAIN_FUNCTIONAL_ASSESSMENT: 0-10
PAIN_FUNCTIONAL_ASSESSMENT: 0-10

## 2020-11-21 ASSESSMENT — PAIN SCALES - GENERAL
PAINLEVEL_OUTOF10: 6
PAINLEVEL_OUTOF10: 5

## 2020-11-21 NOTE — GROUP NOTE
Group Therapy Note    Date: 11/21/2020    Group Start Time: 1000  Group End Time: 1100  Group Topic: Psychoeducation    MATT RINCON    TING Spencer, [facility]        Group Therapy Note    Attendees: 6/19         Patient's Goal:  To engage in psycho-education in order to prevent future episodes by delivering behavioral training to improve illness insight, early symptom identification and development of coping strategies. Notes:  Pt actively participated in group; group was focused on strengths exploration. Status After Intervention:  Improved    Participation Level:  Active Listener and Interactive    Participation Quality: Appropriate, Attentive and Sharing      Speech:  normal      Thought Process/Content: Logical      Affective Functioning: Congruent      Mood: anxious      Level of consciousness:  Alert, Oriented x4 and Attentive      Response to Learning: Able to verbalize current knowledge/experience and Able to verbalize/acknowledge new learning      Endings: None Reported    Modes of Intervention: Education, Support and Socialization      Discipline Responsible: /Counselor      Signature:  TING Spencre, [facility]

## 2020-11-21 NOTE — GROUP NOTE
Group Therapy Note    Date: 11/21/2020    Group Start Time: 1600  Group End Time: 1630  Group Topic: Relaxation    [facility] D    Merle Crawford LPN        Group Therapy Note    Attendees: 8/16         Patient's Goal:  To improve relaxation skills    Notes:  Calm activity    Status After Intervention:  Improved    Participation Level: Interactive    Participation Quality: Appropriate      Speech:  normal      Thought Process/Content: Logical      Affective Functioning: Congruent      Mood: euphoric      Level of consciousness:  Alert      Response to Learning: Able to verbalize current knowledge/experience      Endings: None Reported    Modes of Intervention: Activity      Discipline Responsible: Licensed Practical Nurse      Signature:   Timothy Alfaro LPN

## 2020-11-21 NOTE — GROUP NOTE
Group Therapy Note    Date: 11/21/2020    Group Start Time: 0900  Group End Time: 0089  Group Topic: Community Meeting    RICH Sanon        Group Therapy Note    Attendees: 7         Patient's Goal:  To improve goal setting skills     Notes:  Pt was gaurded but participated     Status After Intervention:  Improved    Participation Level:  Active Listener    Participation Quality: Appropriate,      Speech:  mumbles      Thought Process/Content: Logical      Affective Functioning: flat      Mooddepressed      Level of consciousness:  Alert       Response to Learning: Able to verbalize current knowledge/experience and Progressing to goal      Endings: None Reported    Modes of Intervention: Education, Support and Problem-solving      Discipline Responsible: Psychoeducational Specialist      Signature:  Ling Broderick

## 2020-11-21 NOTE — GROUP NOTE
Group Therapy Note    Date: 11/21/2020    Group Start Time: 1330  Group End Time: 3856  Group Topic: Cognitive Skills    MATT Weheler, CTRS        Group Therapy Note    Attendees: *9/19         Patient's Goal:  To improve coping skills         Status After Intervention:  Improved    Participation Level:  Active Listener and Interactive    Participation Quality: Appropriate, Attentive and Sharing      Speech:  normal      Thought Process/Content: Logical      Affective Functioning: flat      Mood:depressed      Level of consciousness:  Alert and Oriented x4      Response to Learning: Able to verbalize current knowledge/experience and Progressing to goal      Endings: None Reported    Modes of Intervention: Education, Support and Problem-solving      Discipline Responsible: Psychoeducational Specialist      Signature:  Marika Marsh

## 2020-11-21 NOTE — CONSULTS
[facility] Internal Medicine    CONSULTATION  / FOLLOW UP VISIT       Date:   11/21/2020  Patient name:  Lakesha Lopez  Date of admission:  11/20/2020 10:15 AM  MRN:   [id number]  Account:  [de-identified]  YOB: 1970  PCP:    Alex Snyder DO  Room:   041/1959-  Code Status:    Full Code    Physician Requesting Consult: Esme Cole MD    History of Present Illness:      C/C ;       REASON FOR CONSULT;  Medical comorbidity and medication management ;                                                 *Active Problems:    Schizoaffective disorder ([address]    Acute cystitis    Right ear pain  Resolved Problems:    * No resolved hospital problems. *            HPI;     11/21/2020    ·  1. History on admission;        Significant last 24 hr data reviewed  [x] yes     Vitals:    11/20/20 1141 11/20/20 2043 11/21/20 0800   BP: 111/83 (!) 108/55 104/63   Pulse: 60 60 74   Resp: 16  14   Temp: 98.3 °F (36.8 °C) 97.8 °F (36.6 °C) 97.3 °F (36.3 °C)   TempSrc: Oral Oral Oral   Weight: 245 lb (111.1 kg)     Height: 5' 6\" (1.676 m)        Recent Results (from the past 24 hour(s))   EKG 12 Lead    Collection Time: 11/20/20 11:12 PM   Result Value Ref Range    Ventricular Rate 55 BPM    Atrial Rate 55 BPM    P-R Interval 152 ms    QRS Duration 90 ms    Q-T Interval 496 ms    QTc Calculation (Bazett) 474 ms    P Axis 111 degrees    R Axis -173 degrees    T Axis 157 degrees     No results for input(s): POCGLU in the last 72 hours. No results found. ---     Past and surgical history as recorded in H&P  Social History:     Tobacco:    reports that she has been smoking cigarettes. She has never used smokeless tobacco.  Alcohol:      reports previous alcohol use. Drug Use:  reports previous drug use.     Review of Systems:     POSITIVE AND NEGATIVES AS DESCRIBED IN HISTORY OF PRESENT ILLNESS ;  IN ADDITION ;  Review of Systems          All other systems negative                Physical Exam:     Physical Exam   Vitals:    11/20/20 1141 11/20/20 2043 11/21/20 0800   BP: 111/83 (!) 108/55 104/63   Pulse: 60 60 74   Resp: 16  14   Temp: 98.3 °F (36.8 °C) 97.8 °F (36.6 °C) 97.3 °F (36.3 °C)   TempSrc: Oral Oral Oral   Weight: 245 lb (111.1 kg)     Height: 5' 6\" (1.676 m)                     Body mass index is 39.54 kg/m². General Appearance:   -, CO-OPERATIVE ,                                                        Pulmonary/Chest:        Clear to auscultation bilaterally . No wheezes, rales or rhonchi . Cardiovascular:            Normal rate, regular rhythm,                                          No murmur or  Gallop . Abdomen:                       Soft, non-tender                                                                                    Extremities:                    No Edema . Mental status as per psych notes         Data:     Labs:      URINE ANALYSIS: No results found for: LABURIN     CBC:  Lab Results   Component Value Date    WBC 8.4 11/19/2020    HGB 14.8 11/19/2020     11/19/2020        BMP:    Lab Results   Component Value Date     11/19/2020    K 4.2 11/19/2020     11/19/2020    CO2 20 11/19/2020    BUN 13 11/19/2020    CREATININE 1.0 11/19/2020    GLUCOSE 111 11/19/2020      LIVER PROFILE:  Lab Results   Component Value Date    ALT 12 11/19/2020    AST 12 11/19/2020    PROT 6.6 11/19/2020    BILITOT 0.2 11/19/2020    LABALBU 4.2 11/19/2020             Radiology:           Assessment :      Primary Problem  Active Problems:    Schizoaffective disorder ([facility] [address]    Acute cystitis    Right ear pain  Resolved Problems:    * No resolved hospital problems. *              Plan:     Change to Cipro as it will cover UTI as well as otitis media     11/21/20    ·  1. Medications: Allergies:     Allergies   Allergen Reactions

## 2020-11-21 NOTE — PLAN OF CARE
[address] [place] [address]  Day 3 Interdisciplinary Treatment Plan NOTE    Review Date & Time: 11/21/2020  1312    Admission Type:   Admission Type: Voluntary    Reason for admission:  Reason for Admission: auditory hallucinations, patient reports she was wanting to cut herself  Estimated Length of Stay: 5-7 days  Estimated Discharge Date Update: to be determined by physician    PATIENT STRENGTHS:  Patient Strengths Strengths: Connection to output provider, Communication  Patient Strengths and Limitations:Limitations: Difficult relationships / poor social skills, Tendency to isolate self, Difficulty problem solving/relies on others to help solve problems  Addictive Behavior:Addictive Behavior  In the past 3 months, have you felt or has someone told you that you have a problem with:  : None  Do you have a history of Chemical Use?: No  Do you have a history of Alcohol Use?: No  Do you have a history of Street Drug Abuse?: Yes  Histroy of Prescripton Drug Abuse?: No  Medical Problems:  Past Medical History:   Diagnosis Date    Cocaine abuse ([facility] [address]        Risk:  Fall RiskTotal: 85  Renato Scale Renato Scale Score: 22  BVC Total: 0  Change in scores no Changes to plan of Care no    Status EXAM:   Status and Exam  Normal: No  Facial Expression: Flat  Affect: Blunt  Level of Consciousness: Alert  Mood:Normal: No  Mood: Anxious, Helpless, Sad, Terrified  Motor Activity:Normal: No  Motor Activity: Decreased  Interview Behavior: Evasive  Preception: [place] to Person, [place] to Time, [place] to Place, [place] to Situation  Attention:Normal: No  Attention: Distractible  Thought Processes: Circumstantial  Thought Content:Normal: No  Thought Content: Preoccupations, Paranoia  Hallucinations:  Auditory (Comment)  Delusions: Yes  Delusions: Persecution  Memory:Normal: No  Memory: Poor Recent  Insight and Judgment: No  Insight and Judgment: Poor Judgment  Present Suicidal Ideation: Yes(Contracts for safety)  Present Homicidal Ideation: No    Daily Assessment Last Entry:             Patient Currently in Pain: Denies       Patient Monitoring:  Frequency of Checks: 4 times per hour, close    Psychiatric Symptoms:   Depression Symptoms  Depression Symptoms: Feelings of helplessness, Impaired concentration, Isolative  Anxiety Symptoms  Anxiety Symptoms: Generalized  Shirley Symptoms  Shirley Symptoms: No problems reported or observed. Psychosis Symptoms  Delusion Type: Paranoid, Persecutory    Suicide Risk CSSR-S:  1) Within the past month, have you wished you were dead or wished you could go to sleep and not wake up? : Yes  2) Have you actually had any thoughts of killing yourself? : Yes  3) Have you been thinking about how you might kill yourself? : Yes  5) Have you started to work out or worked out the details of how to kill yourself?  Do you intend to carry out this plan? : Yes  6) Have you ever done anything, started to do anything, or prepared to do anything to end your life?: Yes  Change in Result no  Change in Plan of care no      EDUCATION:   EDUCATION:   Learner Progress Toward Treatment Goals: Reviewed results and recommendations of this team, Reviewed group plan and strategies, Reviewed signs, symptoms and risk of self harm and violent behavior, Reviewed goals and plan of care    Method:small group, individual verbal education    Outcome:verbalized by patient, but needs reinforcement to obtain goals    PATIENT GOALS:  Short term: getting stabilized on meds   Long term:stay on meds/ follow up    PLAN/TREATMENT RECOMMENDATIONS UPDATE: continue with group therapies, increased socialization, continue planning for after discharge goals, continue with medication compliance    SHORT-TERM GOALS UPDATE:   Time frame for Short-Term Goals: 5-7 days    LONG-TERM GOALS UPDATE:   Time frame for Long-Term Goals: 6 months  Members Present in Team Meeting: See Signature Sheet    TREVIN Flores

## 2020-11-21 NOTE — PLAN OF CARE
Problem: Tobacco Use:  Goal: Inpatient tobacco use cessation counseling participation  Description: Inpatient tobacco use cessation counseling participation  Outcome: Ongoing  Note: Patient given tobacco quitline number 54626554837 at this time, refusing to call at this time, states \" I just dont want to quit now\"- patient given information as to the dangers of long term tobacco use. Continue to reinforce the importance of tobacco cessation. Problem: Altered Mood, Depressive Behavior:  Goal: Able to verbalize and/or display a decrease in depressive symptoms  Description: Able to verbalize and/or display a decrease in depressive symptoms  Outcome: Ongoing  Note: Patient is not able to verbalize a decrease in depressive symptoms this shift. She is seen resting in her room for most of the night, only coming out very briefly for needs. It was reported that patient had not eaten any meals served today. Writer offers patient a meal or a snack and patient declines stating, \"Yeah, I haven't ate all day. I'm doing the whole water thing right now, I don't really trust the food. \" Patient showed that the snacks packaging was sealed but patient continues to decline. Reports she \"doesn't sleep\" and declines offered prn sleep aide as well. Patient is thought-blocked at times, but cooperative. She did allow the ekg assessment and vitals this evening. She refused her hs medications reporting she does not take Cogentin without her Geodon and also the Topamax she reports is in place of her cocaine use, which she does not need either. Encouraged to speak with MD in the morning regarding discrepancies and patient is agreeable. Safety maintained per unit policy, including q 23M patient safety checks.      Problem: Altered Mood, Depressive Behavior:  Goal: Ability to disclose and discuss suicidal ideas will improve  Description: Ability to disclose and discuss suicidal ideas will improve  Outcome: Ongoing  Note: Patient reports fleeting suicidal ideations this shift but denies a specific plan or intent and is able to contract for safety while on the unit. Patient agrees to seek assistance from staff should thoughts of self harm arise. Will continue to monitor patient for safety and behavior. Problem: Altered Mood, Depressive Behavior:  Goal: Absence of self-harm  Description: Absence of self-harm  Outcome: Ongoing  Note: Patient remains free from self harm. 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 She is seen isolative resting in her room for most of the shift, only coming out briefly for needs. She is calm and controlled. Patient does appear to have increased paranoia regarding medications and food. Emotional support and reassurance given.

## 2020-11-21 NOTE — PROGRESS NOTES
[address] NOTE     11/21/2020     Patient was seen and examined in person, Chart reviewed   Patient's case discussed with staff/team    Chief Complaint: Suicidal ideation    Interim History:     Patient reports that she does not feel safe in [address]. She believes that people in the neighborhood are out to harm her. The patient wants to move to [place]. She has no connections in [place]. We discussed that these symptoms are unlikely to resolve just by moving to [place]. The patient reports a history of extensive drug use in [facility] [place] where she is originally from. The patient has been taking her medications and denies any problems. /63   Pulse 74   Temp 97.3 °F (36.3 °C) (Oral)   Resp 14   Ht 5' 6\" (1.676 m)   Wt 245 lb (111.1 kg)   BMI 39.54 kg/m²   Appetite:   [x] Normal/Unchanged  [] Increased  [] Decreased      Sleep:       [] Normal/Unchanged  [x] Fair       [] Poor              Energy:    [] Normal/Unchanged  [] Increased  [x] Decreased        Aggression:  [] yes  [x] no    Patient is [x] able  [] unable to CONTRACT FOR SAFETY ON THE UNIT    PAST MEDICAL/PSYCHIATRIC HISTORY:   Past Medical History:   Diagnosis Date    Cocaine abuse ([facility] [address]        FAMILY/SOCIAL HISTORY:  History reviewed. No pertinent family history.   Social History     Socioeconomic History    Marital status: Single     Spouse name: Not on file    Number of children: Not on file    Years of education: Not on file    Highest education level: Not on file   Occupational History    Not on file   Social Needs    Financial resource strain: Not on file    Food insecurity     Worry: Not on file     Inability: Not on file    Transportation needs     Medical: Not on file     Non-medical: Not on file   Tobacco Use    Smoking status: Current Every Day Smoker     Types: Cigarettes    Smokeless tobacco: Never Used   Substance and Sexual Activity    Alcohol use: Not Currently    Drug use: Not Currently    Sexual activity: Not Currently   Lifestyle    Physical activity     Days per week: Not on file     Minutes per session: Not on file    Stress: Not on file   Relationships    Social connections     Talks on phone: Not on file     Gets together: Not on file     Attends [employer] service: Not on file     Active member of club or organization: Not on file     Attends meetings of clubs or organizations: Not on file     Relationship status: Not on file    Intimate partner violence     Fear of current or ex partner: Not on file     Emotionally abused: Not on file     Physically abused: Not on file     Forced sexual activity: Not on file   Other Topics Concern    Not on file   Social History Narrative    Not on file           ROS:  [x] All negative/unchanged except if checked.  Explain positive(checked items) below:  [] Constitutional  [] Eyes  [] Ear/Nose/Mouth/Throat  [] Respiratory  [] CV  [] GI  []   [] Musculoskeletal  [] Skin/Breast  [] Neurological  [] Endocrine  [] Heme/Lymph  [] Allergic/Immunologic    Explanation:     MEDICATIONS:    Current Facility-Administered Medications:     erythromycin (YAMILETH-CAP) extended release capsule 500 mg, 500 mg, Oral, Q6H, Carmelita Lynch MD, 500 mg at 11/21/20 1324    ciprofloxacin (CIPRO) tablet 250 mg, 250 mg, Oral, 2 times per day, Ciro Bautista MD    acetaminophen (TYLENOL) tablet 650 mg, 650 mg, Oral, Q4H PRN, Carmelita Lynch MD    aluminum & magnesium hydroxide-simethicone (MAALOX) 200-200-20 MG/5ML suspension 30 mL, 30 mL, Oral, Q6H PRN, Carmelita Lynch MD    hydrOXYzine (ATARAX) tablet 50 mg, 50 mg, Oral, TID PRN, Carmelita Lynch MD    ibuprofen (ADVIL;MOTRIN) tablet 400 mg, 400 mg, Oral, Q6H PRN, Carmelita Lynch MD, 400 mg at 11/21/20 0433    nicotine (NICODERM CQ) 14 MG/24HR 1 patch, 1 patch, Transdermal, Daily, Carmelita Lynch MD    polyethylene glycol (GLYCOLAX) packet 17 g, 17 g, Oral, Daily PRN, Carmelita Lynch MD    traZODone (DESYREL) tablet 50 mg, 50 mg, Oral, Nightly PRN, Nica Moody MD    albuterol (PROVENTIL) nebulizer solution 2.5 mg, 2.5 mg, Nebulization, Q6H PRN, Nica Moody MD    sertraline (ZOLOFT) tablet 100 mg, 100 mg, Oral, Daily, Nica Moody MD, 100 mg at 11/21/20 3790    benztropine (COGENTIN) tablet 0.5 mg, 0.5 mg, Oral, BID, Nica Moody MD, 0.5 mg at 11/21/20 6125    topiramate (TOPAMAX) tablet 25 mg, 25 mg, Oral, BID, Nica Moody MD, 25 mg at 11/21/20 0089    ziprasidone (GEODON) capsule 40 mg, 40 mg, Oral, BID WC, Nica Moody MD, 40 mg at 11/21/20 1716      Examination:  /63   Pulse 74   Temp 97.3 °F (36.3 °C) (Oral)   Resp 14   Ht 5' 6\" (1.676 m)   Wt 245 lb (111.1 kg)   BMI 39.54 kg/m²   Gait - steady  Medication side effects(SE): none    Mental Status Examination:    Level of consciousness:  within normal limits   Appearance:  fair grooming and fair hygiene  Behavior/Motor:  no abnormalities noted  Attitude toward examiner:  cooperative  Speech:  spontaneous, normal rate and normal volume   Mood: constricted  Affect:  mood congruent  Thought processes:  linear, goal directed and coherent   Thought content:  Homicidal ideation - none  Suicidal Ideation:  passive  Delusions:  paranoid  Perceptual Disturbance:  auditory  Cognition:  oriented to person, place, and time   Concentration intact  Insight fair   Judgement fair     ASSESSMENT:   Patient symptoms are:  [] Well controlled  [x] Improving  [] Worsening  [] No change      Diagnosis:   Active Problems:    Schizoaffective disorder (HCC)    Acute cystitis    Right ear pain  Resolved Problems:    * No resolved hospital problems.  *      LABS:    Recent Labs     11/19/20 2228   WBC 8.4   HGB 14.8        Recent Labs     11/19/20 2228      K 4.2      CO2 20*   BUN 13   CREATININE 1.0   GLUCOSE 111*     Recent Labs     11/19/20 2228   BILITOT 0.2*   ALKPHOS 105   AST 12   ALT 12     No results found for: Lida Bergman, Fouzia Elias, Carolyn Enciso, OPIATESCREENURINE, PHENCYCLIDINESCREENURINE, PPXUR, ETOH  Lab Results   Component Value Date    TSH 2.060 11/19/2020     No results found for: LITHIUM  No results found for: VALPROATE, CBMZ    RISK ASSESSMENT: low risk of suicide or harm to others    Treatment Plan:  Reviewed current Medications with the patient. No changes. Risks, benefits, side effects, drug-to-drug interactions and alternatives to treatment were discussed. The patient  consented to treatment. Encourage patient to attend group and other milieu activities. Discharge planning discussed with the patient and treatment team.    PSYCHOTHERAPY/COUNSELING:  [] Therapeutic interview  [x] Supportive  [] CBT  [] Ongoing  [] Other    [x] Patient continues to need, on a daily basis, active treatment furnished directly by or requiring the supervision of inpatient psychiatric personnel      Anticipated Length of stay:3-5 days. Gisela Wong is a 47 y.o. female being evaluated face to face.     --Annette Perrin MD on 11/21/2020 at 2:54 PM    An electronic signature was used to authenticate this note. **This report has been created using voice recognition software. It may contain minor errors which are inherent in voice recognition technology. **

## 2020-11-22 PROCEDURE — 6370000000 HC RX 637 (ALT 250 FOR IP): Performed by: PSYCHIATRY & NEUROLOGY

## 2020-11-22 PROCEDURE — 1240000000 HC EMOTIONAL WELLNESS R&B

## 2020-11-22 PROCEDURE — 6370000000 HC RX 637 (ALT 250 FOR IP): Performed by: INTERNAL MEDICINE

## 2020-11-22 PROCEDURE — 99232 SBSQ HOSP IP/OBS MODERATE 35: CPT | Performed by: INTERNAL MEDICINE

## 2020-11-22 PROCEDURE — 99232 SBSQ HOSP IP/OBS MODERATE 35: CPT | Performed by: PSYCHIATRY & NEUROLOGY

## 2020-11-22 RX ADMIN — ERYTHROMYCIN 500 MG: 250 CAPSULE, DELAYED RELEASE PELLETS ORAL at 00:35

## 2020-11-22 RX ADMIN — ERYTHROMYCIN 500 MG: 250 CAPSULE, DELAYED RELEASE PELLETS ORAL at 13:07

## 2020-11-22 RX ADMIN — BENZTROPINE MESYLATE 0.5 MG: 0.5 TABLET ORAL at 08:53

## 2020-11-22 RX ADMIN — ERYTHROMYCIN 500 MG: 250 CAPSULE, DELAYED RELEASE PELLETS ORAL at 05:45

## 2020-11-22 RX ADMIN — SERTRALINE HYDROCHLORIDE 100 MG: 100 TABLET ORAL at 08:52

## 2020-11-22 RX ADMIN — ZIPRASIDONE HYDROCHLORIDE 40 MG: 40 CAPSULE ORAL at 17:39

## 2020-11-22 RX ADMIN — CIPROFLOXACIN HYDROCHLORIDE 250 MG: 250 TABLET, FILM COATED ORAL at 08:52

## 2020-11-22 RX ADMIN — CIPROFLOXACIN HYDROCHLORIDE 250 MG: 250 TABLET, FILM COATED ORAL at 20:34

## 2020-11-22 RX ADMIN — TOPIRAMATE 25 MG: 25 TABLET, FILM COATED ORAL at 20:34

## 2020-11-22 RX ADMIN — BENZTROPINE MESYLATE 0.5 MG: 0.5 TABLET ORAL at 20:35

## 2020-11-22 RX ADMIN — ACETAMINOPHEN 650 MG: 325 TABLET, FILM COATED ORAL at 05:45

## 2020-11-22 RX ADMIN — ZIPRASIDONE HYDROCHLORIDE 40 MG: 40 CAPSULE ORAL at 08:52

## 2020-11-22 RX ADMIN — TOPIRAMATE 25 MG: 25 TABLET, FILM COATED ORAL at 08:53

## 2020-11-22 ASSESSMENT — PAIN - FUNCTIONAL ASSESSMENT
PAIN_FUNCTIONAL_ASSESSMENT: 0-10
PAIN_FUNCTIONAL_ASSESSMENT: 0-10

## 2020-11-22 ASSESSMENT — PAIN SCALES - GENERAL: PAINLEVEL_OUTOF10: 10

## 2020-11-22 NOTE — PROGRESS NOTES
BEHAVIORAL HEALTH FOLLOW-UP NOTE     11/22/2020     Patient was seen and examined in person, Chart reviewed   Patient's case discussed with staff/team    Chief Complaint: Suicidal ideation    Interim History:     The patient states that she is feeling much better today. She thinks that she is now on the right dose of Geodon. She had only been taking Geodon 40 mg once a day prior to admission. She is denying any side effects from her medications. Her mood seems to be improved. She is denying any paranoid thoughts. She is discharge focused. She is aware of the need to take a meal with Geodon to help with absorption. /68   Pulse 61   Temp 97.9 °F (36.6 °C) (Oral)   Resp 14   Ht 5' 6\" (1.676 m)   Wt 245 lb (111.1 kg)   SpO2 100%   BMI 39.54 kg/m²   Appetite:   [x] Normal/Unchanged  [] Increased  [] Decreased      Sleep:       [] Normal/Unchanged  [x] Fair       [] Poor              Energy:    [] Normal/Unchanged  [] Increased  [x] Decreased        Aggression:  [] yes  [x] no    Patient is [x] able  [] unable to CONTRACT FOR SAFETY ON THE UNIT    PAST MEDICAL/PSYCHIATRIC HISTORY:   Past Medical History:   Diagnosis Date    [facility]        FAMILY/SOCIAL HISTORY:  History reviewed. No pertinent family history.   Social History     Socioeconomic History    Marital status: Single     Spouse name: Not on file    Number of children: Not on file    Years of education: Not on file    Highest education level: Not on file   Occupational History    Not on file   Social Needs    Financial resource strain: Not on file    Food insecurity     Worry: Not on file     Inability: Not on file    Transportation needs     Medical: Not on file     Non-medical: Not on file   Tobacco Use    Smoking status: Current Every Day Smoker     Types: Cigarettes    Smokeless tobacco: Never Used   Substance and Sexual Activity    Alcohol use: Not Currently    Drug use: Not Currently    Sexual activity: Not 2.5 mg, Nebulization, Q6H PRN, Carmela Rain MD    sertraline (ZOLOFT) tablet 100 mg, 100 mg, Oral, Daily, Carmela Rain MD, 100 mg at 11/22/20 4721    benztropine (COGENTIN) tablet 0.5 mg, 0.5 mg, Oral, BID, Carmela Rain MD, 0.5 mg at 11/22/20 0853    topiramate (TOPAMAX) tablet 25 mg, 25 mg, Oral, BID, Carmela Rain MD, 25 mg at 11/22/20 0853    ziprasidone (GEODON) capsule 40 mg, 40 mg, Oral, BID WC, Carmela Rain MD, 40 mg at 11/22/20 6978      Examination:  /68   Pulse 61   Temp 97.9 °F (36.6 °C) (Oral)   Resp 14   Ht 5' 6\" (1.676 m)   Wt 245 lb (111.1 kg)   SpO2 100%   BMI 39.54 kg/m²   Gait - steady  Medication side effects(SE): none    Mental Status Examination:    Level of consciousness:  within normal limits   Appearance: Good grooming and good hygiene  Behavior/Motor:  no abnormalities noted  Attitude toward examiner:  cooperative  Speech:  spontaneous, normal rate and normal volume   Mood: Euthymic  Affect:  mood congruent  Thought processes:  linear, goal directed and coherent   Thought content:  Homicidal ideation - none  Suicidal Ideation: Denies  Delusions: None  Perceptual Disturbance: Denies  Cognition:  oriented to person, place, and time   Concentration intact  Insight fair   Judgement fair     ASSESSMENT:   Patient symptoms are:  [] Well controlled  [x] Improving  [] Worsening  [] No change      Diagnosis:   Active Problems:    Schizoaffective disorder (HCC)    Acute cystitis    Right ear pain  Resolved Problems:    * No resolved hospital problems.  *      LABS:    Recent Labs     11/19/20 2228   WBC 8.4   HGB 14.8        Recent Labs     11/19/20 2228      K 4.2      CO2 20*   BUN 13   CREATININE 1.0   GLUCOSE 111*     Recent Labs     11/19/20 2228   BILITOT 0.2*   ALKPHOS 105   AST 12   ALT 12     No results found for: Alois Churches, CANNAB, COCAINESCRN, LABMETH, OPIATESCREENURINE, PHENCYCLIDINESCREENURINE, PPXUR, ETOH  Lab Results Component Value Date    TSH 2.060 11/19/2020     No results found for: LITHIUM  No results found for: VALPROATE, CBMZ    RISK ASSESSMENT: low risk of suicide or harm to others    Treatment Plan:  Reviewed current Medications with the patient. No changes. Risks, benefits, side effects, drug-to-drug interactions and alternatives to treatment were discussed. The patient  consented to treatment. Encourage patient to attend group and other milieu activities. Discharge planning discussed with the patient and treatment team.    PSYCHOTHERAPY/COUNSELING:  [] Therapeutic interview  [x] Supportive  [] CBT  [] Ongoing  [] Other    [x] Patient continues to need, on a daily basis, active treatment furnished directly by or requiring the supervision of inpatient psychiatric personnel      Anticipated Length of stay: 1-2 days. Mohini Painting is a 47 y.o. female being evaluated face to face.     --Ene Almonte MD on 11/22/2020 at 4:22 PM    An electronic signature was used to authenticate this note. **This report has been created using voice recognition software. It may contain minor errors which are inherent in voice recognition technology. **

## 2020-11-22 NOTE — GROUP NOTE
Group Therapy Note    Date: 11/22/2020    Group Start Time: 1000  Group End Time: 1100  Group Topic: Psychoeducation    MATT RINCON    TING Aponte, [facility]        Group Therapy Note    Attendees: 9/18         Patient's Goal: To engage in psychoeducation in the prevention of future episodes by delivering behavioral training to improve illness insight, early symptom identification and development of coping strategies. Notes: Pt actively engaged in group activity; focused on self-esteem building exercise. Status After Intervention:  Improved    Participation Level:  Active Listener and Interactive    Participation Quality: Appropriate, Attentive, Sharing and Supportive      Speech:  normal      Thought Process/Content: Logical      Affective Functioning: Congruent      Mood: anxious      Level of consciousness:  Alert, Oriented x4 and Attentive      Response to Learning: Able to verbalize current knowledge/experience and Able to verbalize/acknowledge new learning      Endings: None Reported    Modes of Intervention: Support, Socialization, Exploration and Activity      Discipline Responsible: /Counselor      Signature:  TING Aponte, [place]

## 2020-11-22 NOTE — PROGRESS NOTES
Physical Therapy    Facility/Department: [facility]    DATE: 2020    NAME: Kevin Brown  MRN: [id number]   : 1970    Patient not seen this date for Physical Therapy due to:  [] Blood transfusion in progress  [] Hemodialysis  [] Patient Declined  [] Spine Precautions   [] Strict Bedrest  [] Surgery/ Procedure  [] Testing      [x] Other Observed Pt ambulating without a device with steady gait. RN concurs no physical therapy is indicated at this time. [x] PT is being discontinued at this time. Patient independent. No further needs. [] PT is being discontinued at this time due to declining physical/ medical status. Therapy is not appropriate at this time.     Roberto Aviles, PT

## 2020-11-22 NOTE — CONSULTS
[facility]    CONSULTATION  / FOLLOW UP VISIT       Date:   11/22/2020  Patient name:  Gilles Whitmore  Date of admission:  11/20/2020 10:15 AM  MRN:   [id number]  Account:  [de-identified]  YOB: 1970  PCP:    Aurea Ragland DO  Room:   8886/6306-60  Code Status:    Full Code    Physician Requesting Consult: Good Quintero MD    History of Present Illness:      C/C ;       REASON FOR CONSULT;  Medical comorbidity and medication management ;                                                 *Active Problems:    Schizoaffective disorder ([address]    Acute cystitis    Right ear pain  Resolved Problems:    * No resolved hospital problems. *            HPI;     11/22/2020    ·  1. History on admission;        Significant last 24 hr data reviewed  [x] yes     Vitals:    11/20/20 2043 11/21/20 0800 11/21/20 2000 11/22/20 0800   BP: (!) 108/55 104/63 (!) 86/48 112/68   Pulse: 60 74 79 61   Resp:  14 12 14   Temp: 97.8 °F (36.6 °C) 97.3 °F (36.3 °C)  97.9 °F (36.6 °C)   TempSrc: Oral Oral  Oral   SpO2:   100%    Weight:       Height:          No results found for this or any previous visit (from the past 24 hour(s)). No results for input(s): POCGLU in the last 72 hours. No results found. ---     Past and surgical history as recorded in H&P  Social History:     Tobacco:    reports that she has been smoking cigarettes. She has never used smokeless tobacco.  Alcohol:      reports previous alcohol use. Drug Use:  reports previous drug use.     Review of Systems:     POSITIVE AND NEGATIVES AS DESCRIBED IN HISTORY OF PRESENT ILLNESS ;  IN ADDITION ;  Review of Systems          All other systems negative                Physical Exam:     Physical Exam   Vitals:    11/20/20 2043 11/21/20 0800 11/21/20 2000 11/22/20 0800   BP: (!) 108/55 104/63 (!) 86/48 112/68   Pulse: 60 74 79 61   Resp:  14 12 14   Temp: 97.8 °F (36.6 °C) 97.3 °F (36.3 °C) 97.9 °F (36.6 °C)   TempSrc: Oral Oral  Oral   SpO2:   100%    Weight:       Height:                       Body mass index is 39.54 kg/m². General Appearance:   -, CO-OPERATIVE ,                                                        Pulmonary/Chest:        Clear to auscultation bilaterally . No wheezes, rales or rhonchi . Cardiovascular:            Normal rate, regular rhythm,                                          No murmur or  Gallop . Abdomen:                       Soft, non-tender                                                                                    Extremities:                    No Edema . Mental status as per psych notes         Data:     Labs:      URINE ANALYSIS: No results found for: LABURIN     CBC:  Lab Results   Component Value Date    WBC 8.4 11/19/2020    HGB 14.8 11/19/2020     11/19/2020        BMP:    Lab Results   Component Value Date     11/19/2020    K 4.2 11/19/2020     11/19/2020    CO2 20 11/19/2020    BUN 13 11/19/2020    CREATININE 1.0 11/19/2020    GLUCOSE 111 11/19/2020      LIVER PROFILE:  Lab Results   Component Value Date    ALT 12 11/19/2020    AST 12 11/19/2020    PROT 6.6 11/19/2020    BILITOT 0.2 11/19/2020    LABALBU 4.2 11/19/2020             Radiology:           Assessment :      Primary Problem  Active Problems:    Schizoaffective disorder ([address]    Acute cystitis    Right ear pain  Resolved Problems:    * No resolved hospital problems. *              Plan:     Change to Cipro as it will cover UTI as well as otitis media     11/22/2020    · Patient is on Cipro for UTI  · Pyuria 1. Complains of right ear pain  2. No discharge    We will continue Cipro  Discontinue erythromycin  We will sign off  Patient is afebrile                 Medications: Allergies:     Allergies   Allergen Reactions    Pcn [Penicillins]      As child    Sulfa Antibiotics As child    Tegretol [Carbamazepine] Hives     And convulsions       Current Meds:   Scheduled Meds:    ciprofloxacin  250 mg Oral 2 times per day    nicotine  1 patch Transdermal Daily    sertraline  100 mg Oral Daily    benztropine  0.5 mg Oral BID    topiramate  25 mg Oral BID    ziprasidone  40 mg Oral BID WC     Continuous Infusions:   PRN Meds: acetaminophen, aluminum & magnesium hydroxide-simethicone, hydrOXYzine, ibuprofen, polyethylene glycol, traZODone, albuterol      Thanks for consulting us . Will monitorvitals and clinical course , and  Optimize therapy  as needed . Aleksandra Mar MD    Copy sent to Dr. Alex Snyder, DO    Pleasenote that this chart was generated using voice recognition Dragon dictation software. Although every effort was made to ensure the accuracy of this automated transcription, some errors in transcription may have occurred.

## 2020-11-22 NOTE — PLAN OF CARE
Problem: Altered Mood, Depressive Behavior:  Goal: Able to verbalize and/or display a decrease in depressive symptoms  Description: Able to verbalize and/or display a decrease in depressive symptoms  11/21/2020 2243 by Juventino Goldmann, RN  Outcome: Ongoing  Patient stated she was not feeling depressed during her shift assessment. Patient was flat isolative to self and room, only out for needs, and social with select staff. Patient showed signs of agitation due to a discrepancy with a medication but patient was able to be redirected. Will continue to monitor for symptoms. Problem: Altered Mood, Depressive Behavior:  Goal: Ability to disclose and discuss suicidal ideas will improve  Description: Ability to disclose and discuss suicidal ideas will improve  11/21/2020 2243 by Juventino Goldmann, RN  Outcome: Ongoing  Patient stated that she was not feeling suicidal at this time. Patient given a safe environment and monitored every 15 minutes and as needed for safety and environment checks. Problem: Altered Mood, Depressive Behavior:  Goal: Absence of self-harm  Description: Absence of self-harm  11/21/2020 2243 by Juventino Goldmann, RN  Outcome: Ongoing  Patient was free of self-harm during this shift. Patient stated she was not having thoughts of hurting herself and verbalized she would come to staff if thoughts of self-harm arise. Patient monitored every 15 minutes and as needed for safety and environment checks.

## 2020-11-22 NOTE — GROUP NOTE
Group Therapy Note    Date: 11/22/2020    Group Start Time: 1600  Group End Time: 1644  Group Topic: Group Documentation    [facility]    Pranay Thapa LPN        Group Therapy Note    Attendees: 8/17         Patient's Goal:  Communicate of feelings    Notes:  Participated appropriately    Status After Intervention:  Improved    Participation Level: Interactive    Participation Quality: Sharing      Speech:  normal      Thought Process/Content: Logical      Affective Functioning: Congruent      Mood: euthymic      Level of consciousness:  Alert      Response to Learning: Able to verbalize current knowledge/experience      Endings: None Reported    Modes of Intervention: Activity      Discipline Responsible: Licensed Practical Nurse      Signature:  Pranay Thapa LPN

## 2020-11-22 NOTE — GROUP NOTE
Group Therapy Note    Date: 11/22/2020    Group Start Time: 1330  Group End Time: 5466  Group Topic: Cognitive Skills    MATT Milian, CTRS        Group Therapy Note    Attendees: 11/18         Patient's Goal:  To improve coping skills / improve decision making skills     Notes:   Pt was pleasant and participated well     Status After Intervention:  Improved    Participation Level:  Active Listener and Interactive    Participation Quality: Appropriate, Attentive and Sharing      Speech:  normal      Thought Process/Content: Logical      Affective Functioning: Congruent      Mood: euthymic      Level of consciousness:  Alert and Oriented x4      Response to Learning: Able to verbalize current knowledge/experience and Progressing to goal      Endings: None Reported    Modes of Intervention: Education, Support and Socialization      Discipline Responsible: Psychoeducational Specialist      Signature:  Emma Willingham

## 2020-11-22 NOTE — GROUP NOTE
Group Therapy Note    Date: 11/22/2020    Group Start Time: 0900  Group End Time: 0381  Group Topic: Community Meeting    RICH Quevedo        Group Therapy Note    Attendees: 8         Patient's Goal:  To improve goal setting skills/ improve decision making skills     Notes:   Pt was pleasant and participated well     Status After Intervention:  Improved    Participation Level:  Active Listener and Interactive    Participation Quality: Appropriate, Attentive and Sharing      Speech:  normal      Thought Process/Content: Logical      Affective Functioning: Congruent      Mood: euthymic      Level of consciousness:  Alert and Oriented x4      Response to Learning: Able to verbalize current knowledge/experience and Progressing to goal      Endings: None Reported    Modes of Intervention: Education, Support and Problem-solving      Discipline Responsible: Psychoeducational Specialist      Signature:  Lu Manning

## 2020-11-22 NOTE — PLAN OF CARE
Problem: Tobacco Use:  Goal: Inpatient tobacco use cessation counseling participation  Description: Inpatient tobacco use cessation counseling participation  Outcome: Ongoing   Smoking education provided  Problem: Altered Mood, Depressive Behavior:  Goal: Able to verbalize and/or display a decrease in depressive symptoms  Description: Able to verbalize and/or display a decrease in depressive symptoms  Outcome: Ongoing   Patient is calm, controlled, and medication compliant. Patient denies suicidal ideations, is flat and anxious but polite with staff. Patient attends groups, reports eating and sleeping adequately with safety checks Q15min and at irregular intervals. Patient maintains her ADL's and reports eating and sleeping adequately. Problem: Altered Mood, Depressive Behavior:  Goal: Ability to disclose and discuss suicidal ideas will improve  Description: Ability to disclose and discuss suicidal ideas will improve  Outcome: Ongoing   Patient is calm, controlled, and medication compliant. Patient denies suicidal ideations, is flat and anxious but polite with staff. Patient attends groups, reports eating and sleeping adequately with safety checks Q15min and at irregular intervals. Patient maintains her ADL's and reports eating and sleeping adequately. Problem: Altered Mood, Depressive Behavior:  Goal: Absence of self-harm  Description: Absence of self-harm  Outcome: Ongoing   Patient is calm, controlled, and medication compliant. Patient denies suicidal ideations, is flat and anxious but polite with staff. Patient attends groups, reports eating and sleeping adequately with safety checks Q15min and at irregular intervals. Patient maintains her ADL's and reports eating and sleeping adequately.

## 2020-11-23 ENCOUNTER — TELEPHONE (OUTPATIENT)
Dept: FAMILY MEDICINE CLINIC | Age: 50
End: 2020-11-23

## 2020-11-23 VITALS
BODY MASS INDEX: 39.37 KG/M2 | WEIGHT: 245 LBS | HEIGHT: 66 IN | SYSTOLIC BLOOD PRESSURE: 124 MMHG | DIASTOLIC BLOOD PRESSURE: 93 MMHG | OXYGEN SATURATION: 100 % | TEMPERATURE: 97.5 F | RESPIRATION RATE: 14 BRPM | HEART RATE: 63 BPM

## 2020-11-23 PROCEDURE — 6370000000 HC RX 637 (ALT 250 FOR IP): Performed by: INTERNAL MEDICINE

## 2020-11-23 PROCEDURE — 6370000000 HC RX 637 (ALT 250 FOR IP): Performed by: PSYCHIATRY & NEUROLOGY

## 2020-11-23 PROCEDURE — 99239 HOSP IP/OBS DSCHRG MGMT >30: CPT | Performed by: PSYCHIATRY & NEUROLOGY

## 2020-11-23 RX ORDER — CIPROFLOXACIN 250 MG/1
250 TABLET, FILM COATED ORAL EVERY 12 HOURS SCHEDULED
Qty: 14 TABLET | Refills: 0 | Status: SHIPPED | OUTPATIENT
Start: 2020-11-23 | End: 2020-11-30

## 2020-11-23 RX ORDER — ZIPRASIDONE HYDROCHLORIDE 40 MG/1
40 CAPSULE ORAL 2 TIMES DAILY WITH MEALS
Qty: 60 CAPSULE | Refills: 3 | Status: ON HOLD | OUTPATIENT
Start: 2020-11-23 | End: 2022-06-14 | Stop reason: HOSPADM

## 2020-11-23 RX ADMIN — ZIPRASIDONE HYDROCHLORIDE 40 MG: 40 CAPSULE ORAL at 08:32

## 2020-11-23 RX ADMIN — TOPIRAMATE 25 MG: 25 TABLET, FILM COATED ORAL at 08:32

## 2020-11-23 RX ADMIN — CIPROFLOXACIN HYDROCHLORIDE 250 MG: 250 TABLET, FILM COATED ORAL at 08:32

## 2020-11-23 RX ADMIN — BENZTROPINE MESYLATE 0.5 MG: 0.5 TABLET ORAL at 08:32

## 2020-11-23 RX ADMIN — SERTRALINE HYDROCHLORIDE 100 MG: 100 TABLET ORAL at 08:32

## 2020-11-23 NOTE — DISCHARGE SUMMARY
DISCHARGE SUMMARY      Patient Elham Contreras  [id number]  47 y.o.  1970    Admit date: 11/20/2020    Discharge date and time: 11/23/2020    Disposition: Home     Admitting Physician: Dick Jang MD     Discharge Physician: Dr Beni Ceballos MD    Admission Diagnoses: Schizoaffective disorder [facility]) [F25.9]    Admission Condition: poor    Discharged Condition: stable    Admission Circumstance: The patient was admitted psychiatry after presenting to ER with suicidal ideation with a plan to cut herself. The patient is in a inpatient but did not feel safe there. She reported auditory hallucinations saying Juan Jose Kolby things about her. PAST MEDICAL/PSYCHIATRIC HISTORY:   Past Medical History:   Diagnosis Date    Cocaine abuse ([facility] [address]        FAMILY/SOCIAL HISTORY:  History reviewed. No pertinent family history.   Social History     Socioeconomic History    Marital status: Single     Spouse name: Not on file    Number of children: Not on file    Years of education: Not on file    Highest education level: Not on file   Occupational History    Not on file   Social Needs    Financial resource strain: Not on file    Food insecurity     Worry: Not on file     Inability: Not on file    Transportation needs     Medical: Not on file     Non-medical: Not on file   Tobacco Use    Smoking status: Current Every Day Smoker     Types: Cigarettes    Smokeless tobacco: Never Used   Substance and Sexual Activity    Alcohol use: Not Currently    Drug use: Not Currently    Sexual activity: Not Currently   Lifestyle    Physical activity     Days per week: Not on file     Minutes per session: Not on file    Stress: Not on file   Relationships    Social connections     Talks on phone: Not on file     Gets together: Not on file     Attends Caodaism service: Not on file     Active member of club or organization: Not on file     Attends meetings of clubs or organizations: Not on file     Relationship status: Not on file   [facility] Intimate partner violence     Fear of current or ex partner: Not on file     Emotionally abused: Not on file     Physically abused: Not on file     Forced sexual activity: Not on file   Other Topics Concern    Not on file   Social History Narrative    Not on file       MEDICATIONS:    Current Facility-Administered Medications:     ciprofloxacin (CIPRO) tablet 250 mg, 250 mg, Oral, 2 times per day, Daryl Hammer MD, 250 mg at 11/23/20 5409    acetaminophen (TYLENOL) tablet 650 mg, 650 mg, Oral, Q4H PRN, Kal Franz MD, 650 mg at 11/22/20 0545    aluminum & magnesium hydroxide-simethicone (MAALOX) 200-200-20 MG/5ML suspension 30 mL, 30 mL, Oral, Q6H PRN, Kla Franz MD    hydrOXYzine (ATARAX) tablet 50 mg, 50 mg, Oral, TID PRN, Kal Franz MD    ibuprofen (ADVIL;MOTRIN) tablet 400 mg, 400 mg, Oral, Q6H PRN, Kal Franz MD, 400 mg at 11/21/20 1624    nicotine (NICODERM CQ) 14 MG/24HR 1 patch, 1 patch, Transdermal, Daily, Kal Franz MD    polyethylene glycol (GLYCOLAX) packet 17 g, 17 g, Oral, Daily PRN, Kal Franz MD    traZODone (DESYREL) tablet 50 mg, 50 mg, Oral, Nightly PRN, Kal Franz MD    albuterol (PROVENTIL) nebulizer solution 2.5 mg, 2.5 mg, Nebulization, Q6H PRN, Kal Franz MD    sertraline (ZOLOFT) tablet 100 mg, 100 mg, Oral, Daily, Kal Franz MD, 100 mg at 11/23/20 6045    benztropine (COGENTIN) tablet 0.5 mg, 0.5 mg, Oral, BID, Kal Franz MD, 0.5 mg at 11/23/20 3184    topiramate (TOPAMAX) tablet 25 mg, 25 mg, Oral, BID, Kal Franz MD, 25 mg at 11/23/20 8206    ziprasidone (GEODON) capsule 40 mg, 40 mg, Oral, BID WC, Kal Franz MD, 40 mg at 11/23/20 5736    Examination:  BP (!) 124/93   Pulse 63   Temp 97.5 °F (36.4 °C) (Oral)   Resp 14   Ht 5' 6\" (1.676 m)   Wt 245 lb (111.1 kg)   SpO2 100%   BMI 39.54 kg/m²   Gait - steady    HOSPITAL COURSE[de-identified]  Following admission to the hospital, patient had a complete physical exam and blood work up, which was unremarkable. Patient was monitored closely with suicide precaution  Patient was started on Geodon 40 mg twice daily on the medications noted above  Was encouraged to participate in group and other milieu activity  Patient started to feel better with this combination of treatment. Significant progress in the symptoms since admission. Mood is improved  The patient denies AVH or paranoid thoughts  The patient denies any hopelessness or worthlessness  No active SI/HI  Appetite:  [x] Normal  [] Increased  [] Decreased    Sleep:       [x] Normal  [] Fair       [] Poor            Energy:    [x] Normal  [] Increased  [] Decreased     SI [] Present  [x] Absent  HI  []Present  [x] Absent   Aggression:  [] yes  [] no  Patient is [x] able  [] unable to CONTRACT FOR SAFETY   Medication side effects(SE):  [x] None(Psych. Meds.) [] Other      Mental Status Examination on discharge:    Level of consciousness:  within normal limits   Appearance:  well-appearing  Behavior/Motor:  no abnormalities noted  Attitude toward examiner:  attentive and good eye contact  Speech:  spontaneous, normal rate and normal volume   Mood: euthymic  Affect:  mood congruent  Thought processes:  linear, goal directed and coherent   Thought content:  Suicidal Ideation:  denies suicidal ideation  Delusions:  no evidence of delusions  Perceptual Disturbance:  denies any perceptual disturbance  Cognition:  oriented to person, place, and time   Concentration intact  Memory intact  Insight good   Judgement fair   Fund of Knowledge adequate      ASSESSMENT:  Patient symptoms are:  [x] Well controlled  [x] Improving  [] Worsening  [] No change      Diagnosis:  Active Problems:    Schizoaffective disorder ([facility] [address]    Acute cystitis    Right ear pain  Resolved Problems:    * No resolved hospital problems. *      LABS:    No results for input(s): WBC, HGB, PLT in the last 72 hours.   No results for input(s): NA, K, CL, CO2, BUN, CREATININE, GLUCOSE in the last 72 hours. No results for input(s): BILITOT, ALKPHOS, AST, ALT in the last 72 hours. No results found for: [place], LABBENZ, CANNAB, COCAINESCRN, LABMETH, OPIATESCREENURINE, PHENCYCLIDINESCREENURINE, PPXUR, ETOH  Lab Results   Component Value Date    TSH 2.060 11/19/2020     No results found for: LITHIUM  No results found for: VALPROATE, CBMZ    RISK ASSESSMENT AT DISCHARGE: Low risk for suicide and homicide. Treatment Plan:  Reviewed current Medications with the patient. Education provided on the complaince with treatment. Risks, benefits, side effects, drug-to-drug interactions and alternatives to treatment were discussed. Encourage patient to attend outpatient follow up appointment and therapy. Patient was advised to call the outpatient provider, visit the nearest ED or call 911 if symptoms are not manageable. Patient's family member was contacted prior to the discharge.          Medication List      START taking these medications    ciprofloxacin 250 MG tablet  Commonly known as:  CIPRO  Take 1 tablet by mouth every 12 hours for 7 days        CONTINUE taking these medications    albuterol sulfate 108 (90 Base) MCG/ACT aerosol powder inhalation  Commonly known as:  Delpha David  Notes to patient:  Wheezing      benztropine 0.5 MG tablet  Commonly known as:  COGENTIN     sertraline 100 MG tablet  Commonly known as:  ZOLOFT     Topamax 25 MG tablet  Generic drug:  topiramate     ziprasidone 40 MG capsule  Commonly known as:  GEODON  Take 1 capsule by mouth 2 times daily (with meals)        STOP taking these medications    erythromycin base 500 MG tablet  Commonly known as:  E-MYCIN           Where to Get Your Medications      These medications were sent to [address] [facility] [phone] - F [phone]  [address] [place] [address], [address] [facility]    Phone:  [phone]   · ciprofloxacin 250 MG tablet  · ziprasidone 40 MG capsule           TIME SPENT - 35 MINUTES TO COMPLETE THE EVALUATION, DISCHARGE SUMMARY, MEDICATION RECONCILIATION AND FOLLOW UP CARE                                         Latonya Brumfield is a 47 y.o. female being evaluated Corky Lakhani MD on 11/23/2020 at 11:38 AM    An electronic signature was used to authenticate this note. **This report has been created using voice recognition software. It may contain minor errors which are inherent in voice recognition technology. **

## 2020-11-23 NOTE — LETTER
[place]  [facility], [address]  Phone: [phone]  Fax: [phone]     November 23, 2020    Shelby Carrion  [address]    Dear Jagdeep Corporal,    This letter is regarding your Emergency Department (ED) visit at [address] [address] on 11/20/20. [facility] wanted to make sure that you understand your discharge instructions and that you were able to fill any prescriptions that may have been ordered for you. Please contact the office at the above phone number if the ED advised you to follow up with Vaz[facility] Ames, or if you have any further questions or needs. Also did you know -   *Visiting the ED for a non-emergency could result in higher co-pays than you would normally be subject to paying? [facility]) practices can often offer you an appointment on the same day that you call. *We have some [facility] offices that offer Walk-in appointments; check our website for availability in your community, www. [email or web address].      *Evisits are now available for patients for $36 through [employer] for certain conditions:  * Sinus, cold and or cough       * Diarrhea            * Headache  * Heartburn                                * Poison Samantha          * Back pain     * Urinary problems                         If you do not have [employer] and are interested, please contact the office and a staff member may assist you or go to www."[email or web address].     Sincerely,     María Dotson DO and your [facility]

## 2020-11-23 NOTE — BH NOTE
[facility]  Discharge Note    Pt discharged with followings belongings:   Dentures: None  Vision - Corrective Lenses: Glasses  Hearing Aid: None  Jewelry: Necklace  Body Piercings Removed: N/A  Clothing: Footwear, Jacket / coat, Pants, Shirt, Undergarments (Comment), Socks  Were All Patient Medications Collected?: Yes  Other Valuables: Cell phone, Carry Peeling sent home with patient . Valuables retrieved from safe, Security envelope number:  [id number] and returned to patient.  Patient education on aftercare instructions: yes  Information faxed to [facility] behavioral  by  staff Patient verbalize understanding of AVS:  yes   Status EXAM upon discharge:alert and oriented discharged to [facility] by cab  Status and Exam  Normal: Yes  Facial Expression: Flat  Affect: Appropriate  Level of Consciousness: Alert  Mood:Normal: Yes  Mood: Anxious  Motor Activity:Normal: Yes  Motor Activity: Decreased  Interview Behavior: Cooperative  Preception: [place] to Person, [place] to Time, [place] to Place, [place] to Situation  Attention:Normal: No  Attention: Distractible  Thought Processes: Circumstantial  Thought Content:Normal: No  Thought Content: Preoccupations  Hallucinations: None  Delusions: No  Delusions: Persecution  Memory:Normal: No  Memory: Poor Recent  Insight and Judgment: No  Insight and Judgment: Poor Judgment  Present Suicidal Ideation: No  Present Homicidal Ideation: No      Metabolic Screening:    No results found for: LABA1C    No results found for: CHOL  No results found for: TRIG  No results found for: HDL  No components found for: LDLCAL  No results found for: LABVLDL    Jhoana Mccracken LPN

## 2020-11-23 NOTE — GROUP NOTE
Group Therapy Note    Date: 11/23/2020    Group Start Time: 0900  Group End Time: 6473  Group Topic: Community Meeting    MATT Panchal , CTRS        Group Therapy Note    Attendees: 7         Patient's Goal:  To improve goal setting skills     Notes:   Pt was pleasant and participated well     Status After Intervention:  Improved    Participation Level:  Active Listener and Interactive    Participation Quality: Appropriate, Attentive and Supportive      Speech:  normal      Thought Process/Content: Logical      Affective Functioning: Congruent      Mood: euthymic      Level of consciousness:  Alert and Oriented x4      Response to Learning: Able to verbalize current knowledge/experience and Progressing to goal      Endings: None Reported    Modes of Intervention: Education, Support and Problem-solving      Discipline Responsible: Psychoeducational Specialist      Signature:  Ursula Matos

## 2020-11-23 NOTE — PLAN OF CARE
Problem: Tobacco Use:  Goal: Inpatient tobacco use cessation counseling participation  Description: Inpatient tobacco use cessation counseling participation  Outcome: Ongoing   Pt denies any tobacco cravings   Problem: Pain:  Goal: Pain level will decrease  Description: Pain level will decrease  Outcome: Met This Shift   Patient remains free from any pain at this time. Problem: Altered Mood, Depressive Behavior:  Goal: Able to verbalize and/or display a decrease in depressive symptoms  Description: Able to verbalize and/or display a decrease in depressive symptoms  Outcome: Ongoing   Crystal is seen in the dayroom. Denies any depression, states that she feel this is the right medication, No issues with sleep or  appetite. Denies any suicidal ideation, or paranoid thoughts. Attending groups, dressed in street clothes. 15 minute safety checks continue.

## 2020-11-23 NOTE — GROUP NOTE
Group Therapy Note    Date: 11/23/2020    Group Start Time: 1100  Group End Time: 2953  Group Topic: cognitive skills    C[facility] BHRICH Marino        Group Therapy Note    Attendees5/6         Patient's Goal:  To improve coping skills     Notes:   Pt was pleasant and participated well     Status After Intervention:  Improved    Participation Level:  Active Listener and Interactive    Participation Quality: Appropriate, Attentive and Supportive      Speech:  normal      Thought Process/Content: Logical      Affective Functioning: Congruent      Mood: euthymic      Level of consciousness:  Alert and Oriented x4      Response to Learning: Able to verbalize current knowledge/experience and Progressing to goal      Endings: None Reported    Modes of Intervention: Education, Support and Problem-solving      Discipline Responsible: Psychoeducational Specialist      Signature:  Leigh Leslie

## 2020-11-23 NOTE — CARE COORDINATION
Name: Lavern Maldonado    : 1970    Discharge Date: 2020    Primary Auth/Cert #: [id number]    Discharge Medications:      Medication List      START taking these medications    ciprofloxacin 250 MG tablet  Commonly known as:  CIPRO  Take 1 tablet by mouth every 12 hours for 7 days  Notes to patient:  antibiotic        CONTINUE taking these medications    albuterol sulfate 108 (90 Base) MCG/ACT aerosol powder inhalation  Commonly known as:  PROAIR RESPICLICK  Notes to patient:  Wheezing      benztropine 0.5 MG tablet  Commonly known as:  COGENTIN  Notes to patient:  Medication side effects     sertraline 100 MG tablet  Commonly known as:  ZOLOFT  Notes to patient:  depression     Topamax 25 MG tablet  Generic drug:  topiramate  Notes to patient:  Nerve pain     ziprasidone 40 MG capsule  Commonly known as:  GEODON  Take 1 capsule by mouth 2 times daily (with meals)  Notes to patient:  antipsychotic        STOP taking these medications    erythromycin base 500 MG tablet  Commonly known as:  E-MYCIN  Notes to patient:  antibiotic           Where to Get Your Medications      These medications [facility] [address] [facility], [address] [phone] -  [phone]  [address] [facility], [address] [facility]    Phone:  [phone]   · ciprofloxacin 250 MG tablet  · ziprasidone 40 MG capsule         Follow Up Appointment: Please discharge PT to:   [facility]   [address]  [facility] [place] [address], [address]   [phone]    Please send PT by [facility] cab

## 2020-11-23 NOTE — SUICIDE SAFETY PLAN
SAFETY PLAN    A suicide Safety Plan is a document that supports someone when they are having thoughts of suicide. Warning Signs that indicate a suicidal crisis may be developing: What (situations, thoughts, feelings, body sensations, behaviors, etc.) do you experience that lets you know you are beginning to think about suicide? 1. Go off medications  2. Mood is depressed and start to feel sad, hopeless, helpless, guilty, decline in self-esteem, excess worry, no interest in doing any pleasurable activities, unable to concentrate  3. Begin to cry over the smallest of things  4. Not eating or sleeping as normal  5. Relationship issues start happening  6. I become angry and start a fight  7. When I dont listen or respond to people in a good, positive way  Internal Coping Strategies:  What things can I do (relaxation techniques, hobbies, physical activities, etc.) to take my mind off my problems without contacting another person? 1. Go to hospital discharge appointments and follow-up with community mental health counseling  2. Talk with other people  3. Learn to identify and control your emotions by new ways  4. Think before you speak or act; walk away from the situation  5. Join a support group in person or on Social Media  6. Take a time-out  7. Take deep breaths; use relaxation techniques  8. Get some exercise; go for a walk  9. Read; listen to music; watch a funny movie   1. Go to hospital discharge appointments and follow-up with community mental health counseling  2. Talk with other people  3. Learn to identify and control your emotions by new ways  4. Think before you speak or act; walk away from the situation  5. Join a support group in person or on Social Media  6. Take a time-out  7. Take deep breaths; use relaxation techniques  8. Get some exercise; go for a walk  9.  Read; listen to music; watch a funny movie     Professionals or [address] [place] [address] I can contact during a crisis: Who can I call for help - for example, my doctor, my psychiatrist, my psychologist, a mental health provider, a suicide hotline? [facility]   [address]  [place] [address], [address]   [phone]  Suicide Prevention Lifeline: 8-282-573-TALK (0260)  [facility] 2-1-5 (336) 845-5057 or (926) 076-6132  [facility] [address] [facility], 24-hour Crisis Services, [place] [facility]: (904) 434-6992, [address], [address]  SORAYA (National Association of Mental [employer]ness) groups and support, [address] [facility] [address] [phone]  Making the environment safe: How can I make my environment (house/apartment/living space) safer? For example, can I remove guns, medications, and other items? 1. Throw away all medications not being taken  2.  Plan daily goals to help remember to stay on specific medications

## 2020-11-26 LAB
EKG ATRIAL RATE: 55 BPM
EKG P AXIS: 111 DEGREES
EKG P-R INTERVAL: 152 MS
EKG Q-T INTERVAL: 496 MS
EKG QRS DURATION: 90 MS
EKG QTC CALCULATION (BAZETT): 474 MS
EKG R AXIS: -173 DEGREES
EKG T AXIS: 157 DEGREES
EKG VENTRICULAR RATE: 55 BPM

## 2022-06-10 ENCOUNTER — HOSPITAL ENCOUNTER (EMERGENCY)
Age: 52
Discharge: ANOTHER HOSPITAL | End: 2022-06-10
Attending: EMERGENCY MEDICINE
Payer: MEDICARE

## 2022-06-10 ENCOUNTER — APPOINTMENT (OUTPATIENT)
Dept: GENERAL RADIOLOGY | Age: 52
End: 2022-06-10
Payer: MEDICARE

## 2022-06-10 ENCOUNTER — HOSPITAL ENCOUNTER (INPATIENT)
Age: 52
LOS: 4 days | Discharge: HOME | DRG: 885 | End: 2022-06-14
Attending: PSYCHIATRY & NEUROLOGY | Admitting: PSYCHIATRY & NEUROLOGY
Payer: MEDICARE

## 2022-06-10 VITALS
BODY MASS INDEX: 39.37 KG/M2 | DIASTOLIC BLOOD PRESSURE: 88 MMHG | RESPIRATION RATE: 16 BRPM | SYSTOLIC BLOOD PRESSURE: 147 MMHG | HEIGHT: 66 IN | WEIGHT: 245 LBS | HEART RATE: 80 BPM | OXYGEN SATURATION: 95 % | TEMPERATURE: 98.8 F

## 2022-06-10 DIAGNOSIS — R44.0 AUDITORY HALLUCINATIONS: ICD-10-CM

## 2022-06-10 DIAGNOSIS — R45.851 SUICIDAL IDEATION: Primary | ICD-10-CM

## 2022-06-10 PROBLEM — F23 ACUTE PSYCHOSIS (HCC): Status: ACTIVE | Noted: 2022-06-10

## 2022-06-10 LAB
-: ABNORMAL
ABSOLUTE EOS #: 0.03 K/UL (ref 0–0.44)
ABSOLUTE IMMATURE GRANULOCYTE: 0.07 K/UL (ref 0–0.3)
ABSOLUTE LYMPH #: 1.96 K/UL (ref 1.1–3.7)
ABSOLUTE MONO #: 0.96 K/UL (ref 0.1–1.2)
ALBUMIN SERPL-MCNC: 4.2 G/DL (ref 3.5–5.2)
ALBUMIN/GLOBULIN RATIO: 1.6 (ref 1–2.5)
ALP BLD-CCNC: 117 U/L (ref 35–104)
ALT SERPL-CCNC: 13 U/L (ref 5–33)
AMPHETAMINE SCREEN URINE: NEGATIVE
ANION GAP SERPL CALCULATED.3IONS-SCNC: 12 MMOL/L (ref 9–17)
AST SERPL-CCNC: 15 U/L
BARBITURATE SCREEN URINE: NEGATIVE
BASOPHILS # BLD: 0 % (ref 0–2)
BASOPHILS ABSOLUTE: 0.04 K/UL (ref 0–0.2)
BENZODIAZEPINE SCREEN, URINE: POSITIVE
BILIRUB SERPL-MCNC: 0.33 MG/DL (ref 0.3–1.2)
BILIRUBIN URINE: NEGATIVE
BUN BLDV-MCNC: 7 MG/DL (ref 6–20)
CALCIUM SERPL-MCNC: 9.2 MG/DL (ref 8.6–10.4)
CANNABINOID SCREEN URINE: NEGATIVE
CASTS UA: ABNORMAL /LPF (ref 0–8)
CHLORIDE BLD-SCNC: 103 MMOL/L (ref 98–107)
CO2: 23 MMOL/L (ref 20–31)
COCAINE METABOLITE, URINE: NEGATIVE
COLOR: YELLOW
CREAT SERPL-MCNC: 0.76 MG/DL (ref 0.5–0.9)
EOSINOPHILS RELATIVE PERCENT: 0 % (ref 1–4)
EPITHELIAL CELLS UA: ABNORMAL /HPF (ref 0–5)
ETHANOL PERCENT: <0.01 %
ETHANOL: <10 MG/DL
GFR AFRICAN AMERICAN: >60 ML/MIN
GFR NON-AFRICAN AMERICAN: >60 ML/MIN
GFR SERPL CREATININE-BSD FRML MDRD: ABNORMAL ML/MIN/{1.73_M2}
GLUCOSE BLD-MCNC: 101 MG/DL (ref 70–99)
GLUCOSE URINE: NEGATIVE
HCG QUALITATIVE: NEGATIVE
HCT VFR BLD CALC: 45.2 % (ref 36.3–47.1)
HEMOGLOBIN: 15.1 G/DL (ref 11.9–15.1)
IMMATURE GRANULOCYTES: 0 %
KETONES, URINE: NEGATIVE
LEUKOCYTE ESTERASE, URINE: ABNORMAL
LYMPHOCYTES # BLD: 12 % (ref 24–43)
MCH RBC QN AUTO: 30.6 PG (ref 25.2–33.5)
MCHC RBC AUTO-ENTMCNC: 33.4 G/DL (ref 28.4–34.8)
MCV RBC AUTO: 91.5 FL (ref 82.6–102.9)
METHADONE SCREEN, URINE: NEGATIVE
MONOCYTES # BLD: 6 % (ref 3–12)
NITRITE, URINE: NEGATIVE
NRBC AUTOMATED: 0 PER 100 WBC
OPIATES, URINE: NEGATIVE
OXYCODONE SCREEN URINE: NEGATIVE
PDW BLD-RTO: 13.8 % (ref 11.8–14.4)
PH UA: 6 (ref 5–8)
PHENCYCLIDINE, URINE: NEGATIVE
PLATELET # BLD: 277 K/UL (ref 138–453)
PMV BLD AUTO: 10.1 FL (ref 8.1–13.5)
POTASSIUM SERPL-SCNC: 3.6 MMOL/L (ref 3.7–5.3)
PROTEIN UA: NEGATIVE
RBC # BLD: 4.94 M/UL (ref 3.95–5.11)
RBC UA: ABNORMAL /HPF (ref 0–4)
SEG NEUTROPHILS: 82 % (ref 36–65)
SEGMENTED NEUTROPHILS ABSOLUTE COUNT: 13.14 K/UL (ref 1.5–8.1)
SODIUM BLD-SCNC: 138 MMOL/L (ref 135–144)
SPECIFIC GRAVITY UA: 1.01 (ref 1–1.03)
TEST INFORMATION: ABNORMAL
TOTAL PROTEIN: 6.9 G/DL (ref 6.4–8.3)
TURBIDITY: CLEAR
URINE HGB: NEGATIVE
UROBILINOGEN, URINE: NORMAL
WBC # BLD: 16.2 K/UL (ref 3.5–11.3)
WBC UA: ABNORMAL /HPF (ref 0–5)

## 2022-06-10 PROCEDURE — 6360000002 HC RX W HCPCS

## 2022-06-10 PROCEDURE — 81001 URINALYSIS AUTO W/SCOPE: CPT

## 2022-06-10 PROCEDURE — 85025 COMPLETE CBC W/AUTO DIFF WBC: CPT

## 2022-06-10 PROCEDURE — 96372 THER/PROPH/DIAG INJ SC/IM: CPT

## 2022-06-10 PROCEDURE — 80307 DRUG TEST PRSMV CHEM ANLYZR: CPT

## 2022-06-10 PROCEDURE — 6360000002 HC RX W HCPCS: Performed by: STUDENT IN AN ORGANIZED HEALTH CARE EDUCATION/TRAINING PROGRAM

## 2022-06-10 PROCEDURE — 1240000000 HC EMOTIONAL WELLNESS R&B

## 2022-06-10 PROCEDURE — 84703 CHORIONIC GONADOTROPIN ASSAY: CPT

## 2022-06-10 PROCEDURE — 80053 COMPREHEN METABOLIC PANEL: CPT

## 2022-06-10 PROCEDURE — 99285 EMERGENCY DEPT VISIT HI MDM: CPT

## 2022-06-10 PROCEDURE — G0480 DRUG TEST DEF 1-7 CLASSES: HCPCS

## 2022-06-10 PROCEDURE — 12002 RPR S/N/AX/GEN/TRNK2.6-7.5CM: CPT

## 2022-06-10 PROCEDURE — 71045 X-RAY EXAM CHEST 1 VIEW: CPT

## 2022-06-10 RX ORDER — LORAZEPAM 2 MG/ML
2 INJECTION INTRAMUSCULAR EVERY 6 HOURS PRN
Status: DISCONTINUED | OUTPATIENT
Start: 2022-06-10 | End: 2022-06-11

## 2022-06-10 RX ORDER — TRAZODONE HYDROCHLORIDE 50 MG/1
50 TABLET ORAL NIGHTLY PRN
Status: DISCONTINUED | OUTPATIENT
Start: 2022-06-11 | End: 2022-06-14 | Stop reason: HOSPADM

## 2022-06-10 RX ORDER — ATORVASTATIN CALCIUM 20 MG/1
20 TABLET, FILM COATED ORAL NIGHTLY
Status: ON HOLD | COMMUNITY
Start: 2022-06-07 | End: 2022-06-14 | Stop reason: SDUPTHER

## 2022-06-10 RX ORDER — HALOPERIDOL 5 MG/ML
5 INJECTION INTRAMUSCULAR EVERY 6 HOURS PRN
Status: DISCONTINUED | OUTPATIENT
Start: 2022-06-10 | End: 2022-06-11

## 2022-06-10 RX ORDER — MIDAZOLAM HYDROCHLORIDE 5 MG/ML
INJECTION INTRAMUSCULAR; INTRAVENOUS
Status: COMPLETED
Start: 2022-06-10 | End: 2022-06-10

## 2022-06-10 RX ORDER — MIDAZOLAM HYDROCHLORIDE 5 MG/ML
INJECTION INTRAMUSCULAR; INTRAVENOUS
Status: DISCONTINUED
Start: 2022-06-10 | End: 2022-06-10 | Stop reason: WASHOUT

## 2022-06-10 RX ORDER — HYDROXYZINE 50 MG/1
50 TABLET, FILM COATED ORAL 3 TIMES DAILY PRN
Status: DISCONTINUED | OUTPATIENT
Start: 2022-06-10 | End: 2022-06-12

## 2022-06-10 RX ORDER — POLYETHYLENE GLYCOL 3350 17 G/17G
17 POWDER, FOR SOLUTION ORAL DAILY PRN
Status: DISCONTINUED | OUTPATIENT
Start: 2022-06-10 | End: 2022-06-14 | Stop reason: HOSPADM

## 2022-06-10 RX ORDER — CEPHALEXIN 500 MG/1
500 CAPSULE ORAL EVERY 6 HOURS
Status: ON HOLD | COMMUNITY
Start: 2022-06-07 | End: 2022-06-14 | Stop reason: HOSPADM

## 2022-06-10 RX ORDER — MIDAZOLAM HYDROCHLORIDE 2 MG/2ML
5 INJECTION, SOLUTION INTRAMUSCULAR; INTRAVENOUS ONCE
Status: DISCONTINUED | OUTPATIENT
Start: 2022-06-10 | End: 2022-06-10

## 2022-06-10 RX ORDER — MIDAZOLAM HYDROCHLORIDE 2 MG/2ML
2 INJECTION, SOLUTION INTRAMUSCULAR; INTRAVENOUS ONCE
Status: DISCONTINUED | OUTPATIENT
Start: 2022-06-10 | End: 2022-06-10

## 2022-06-10 RX ORDER — ACETAMINOPHEN 325 MG/1
650 TABLET ORAL EVERY 6 HOURS PRN
Status: DISCONTINUED | OUTPATIENT
Start: 2022-06-10 | End: 2022-06-14 | Stop reason: HOSPADM

## 2022-06-10 RX ORDER — RISPERIDONE 1 MG/1
1 TABLET, FILM COATED ORAL 2 TIMES DAILY
Status: ON HOLD | COMMUNITY
Start: 2022-06-07 | End: 2022-06-14 | Stop reason: SDUPTHER

## 2022-06-10 RX ORDER — FUROSEMIDE 20 MG/1
20 TABLET ORAL DAILY
Status: ON HOLD | COMMUNITY
Start: 2022-05-02 | End: 2022-06-14 | Stop reason: SDUPTHER

## 2022-06-10 RX ORDER — MAGNESIUM HYDROXIDE/ALUMINUM HYDROXICE/SIMETHICONE 120; 1200; 1200 MG/30ML; MG/30ML; MG/30ML
30 SUSPENSION ORAL EVERY 6 HOURS PRN
Status: DISCONTINUED | OUTPATIENT
Start: 2022-06-10 | End: 2022-06-14 | Stop reason: HOSPADM

## 2022-06-10 RX ORDER — DIPHENHYDRAMINE HYDROCHLORIDE 50 MG/ML
50 INJECTION INTRAMUSCULAR; INTRAVENOUS EVERY 6 HOURS PRN
Status: DISCONTINUED | OUTPATIENT
Start: 2022-06-10 | End: 2022-06-11

## 2022-06-10 RX ORDER — MELOXICAM 15 MG/1
15 TABLET ORAL DAILY
Status: ON HOLD | COMMUNITY
Start: 2022-01-24 | End: 2022-06-20 | Stop reason: HOSPADM

## 2022-06-10 RX ORDER — HALOPERIDOL 5 MG/ML
5 INJECTION INTRAMUSCULAR ONCE
Status: COMPLETED | OUTPATIENT
Start: 2022-06-10 | End: 2022-06-10

## 2022-06-10 RX ORDER — MIDAZOLAM HYDROCHLORIDE 5 MG/ML
5 INJECTION INTRAMUSCULAR; INTRAVENOUS ONCE
Status: COMPLETED | OUTPATIENT
Start: 2022-06-10 | End: 2022-06-10

## 2022-06-10 RX ORDER — IBUPROFEN 400 MG/1
400 TABLET ORAL EVERY 6 HOURS PRN
Status: DISCONTINUED | OUTPATIENT
Start: 2022-06-10 | End: 2022-06-14 | Stop reason: HOSPADM

## 2022-06-10 RX ADMIN — MIDAZOLAM HYDROCHLORIDE 5 MG: 5 INJECTION, SOLUTION INTRAMUSCULAR; INTRAVENOUS at 10:20

## 2022-06-10 RX ADMIN — HALOPERIDOL LACTATE 5 MG: 5 INJECTION, SOLUTION INTRAMUSCULAR at 10:05

## 2022-06-10 RX ADMIN — MIDAZOLAM HYDROCHLORIDE 5 MG: 5 INJECTION INTRAMUSCULAR; INTRAVENOUS at 10:20

## 2022-06-10 ASSESSMENT — SLEEP AND FATIGUE QUESTIONNAIRES
SLEEP PATTERN: UTA
DO YOU HAVE DIFFICULTY SLEEPING: YES
DO YOU USE A SLEEP AID: COMMENT
AVERAGE NUMBER OF SLEEP HOURS: 6

## 2022-06-10 ASSESSMENT — LIFESTYLE VARIABLES
HOW MANY STANDARD DRINKS CONTAINING ALCOHOL DO YOU HAVE ON A TYPICAL DAY: PATIENT DECLINED
HOW OFTEN DO YOU HAVE A DRINK CONTAINING ALCOHOL: NEVER

## 2022-06-10 ASSESSMENT — ENCOUNTER SYMPTOMS
COUGH: 0
SHORTNESS OF BREATH: 0
VOMITING: 0
NAUSEA: 0
ABDOMINAL PAIN: 0

## 2022-06-10 ASSESSMENT — PAIN - FUNCTIONAL ASSESSMENT
PAIN_FUNCTIONAL_ASSESSMENT: NONE - DENIES PAIN

## 2022-06-10 ASSESSMENT — PATIENT HEALTH QUESTIONNAIRE - PHQ9: SUM OF ALL RESPONSES TO PHQ QUESTIONS 1-9: 8

## 2022-06-10 NOTE — ED NOTES
Patient resting comfortably and in no acute distress. Respirations even and nonlabored. Patient denies any needs at this time. Bed in lowest position. Call light within reach. Will continue to monitor.  Awaiting urine sample at this time     Josefa Kayser, RN  06/10/22 2669 No

## 2022-06-10 NOTE — ED NOTES
The following labs labeled with pt sticker and tubed to lab:     [] Blue     [] Lavender   [] on ice  [] Green/yellow  [] Green/black [] on ice  [] Yellow  [] Red  [] Pink      [] COVID-19 swab    [] Rapid  [] PCR  [] Flu swab  [] Peds Viral Panel     [x] Urine Sample  [] Pelvic Cultures  [] Blood Cultures      Kendall Perez RN  06/10/22 7844

## 2022-06-10 NOTE — ED NOTES
Pt walked out of treatment room, safe guard walking with pt. Pt walking toward the exit door. Writer along with Dr. Sania Bravo escorted pt back to treatment area. Security notified and at bedside. Pt to be transferred to safer area.       Grace Camargo, MARLENI  06/10/22 0083

## 2022-06-10 NOTE — ED NOTES
Patient resting comfortably and in no acute distress. Respirations even and nonlabored. Patient denies any needs at this time. Bed in lowest position. Call light within reach. Will continue to monitor. Awaiting transport to [facility] around 1930 tonight. Patient agreeable with POC.      Natty Lee RN  06/10/22 0274

## 2022-06-10 NOTE — ED PROVIDER NOTES
[address]  ED  eMERGENCY dEPARTMENT eNCOUnter   Attending Attestation     Pt Name: Lacey Harris  MRN: [id number]  [place] 1970  Date of evaluation: 6/10/22       Lacey Harris is a 45 y.o. female who presents with Laceration and Suicidal      History: Patient presents with laceration over the left dorsal forearm. Patient states that she cut herself with a back scratcher because she is hearing voices telling her to do so. Patient says the voices come and go and she has not are taking her medicines and this was causing this. Patient wanting to go home. Exam: Heart rate and rhythm are regular. Lungs are clear to auscultation bilaterally. Abdomen is soft, nontender. Patient has laceration over the dorsal aspect of the wrist which is somewhat irregular and shape. It is approximately 5 cm. There is no bleeding associated with this. Patient is not acting appropriately. Patient is endorsing hearing voices. Patient says that they are telling her to harm herself but she has no plan at this time. Plan for labs and social work consult to discuss with psych. Likely admit. Patient trying to leave the emergency department. Patient placed in the safer place. Patient is concerned to hit her head and the safer place try to harm her self. Plan for admission. I performed a history and physical examination of the patient and discussed management with the resident. I reviewed the residents note and agree with the documented findings and plan of care. Any areas of disagreement are noted on the chart. I was personally present for the key portions of any procedures. I have documented in the chart those procedures where I was not present during the key portions. I have personally reviewed all images and agree with the resident's interpretation. I have reviewed the emergency nurses triage note.  I agree with the chief complaint, past medical history, past surgical history, allergies, medications, social and family history as documented unless otherwise noted below. Documentation of the HPI, Physical Exam and Medical Decision Making performed by medical students or scribes is based on my personal performance of the HPI, PE and MDM. For Phys Assistant/ Nurse Practitioner cases/documentation I have had a face to face evaluation of this patient and have completed at least one if not all key elements of the E/M (history, physical exam, and MDM). Additional findings are as noted. For APC cases I have personally evaluated and examined the patient in conjunction with the APC and agree with the treatment plan and disposition of the patient as recorded by the APC.     Elda Mejia MD  Attending Emergency  Physician       Yin Blue MD  06/10/22 1300

## 2022-06-10 NOTE — ED NOTES
Patient resting comfortably and in no acute distress. Respirations even and nonlabored. Patient denies any needs at this time. Bed in lowest position. Call light within reach. Will continue to monitor.      Gasper Mendez RN  06/10/22 9295

## 2022-06-10 NOTE — ED NOTES
Patient resting comfortably and in no acute distress. Respirations even and nonlabored. Patient denies any needs at this time. Bed in lowest position. Call light within reach. Will continue to monitor. Patient given boxed lunch.      Mieky Herrera RN  06/10/22 1642

## 2022-06-10 NOTE — ED NOTES
Patient resting comfortably and in no acute distress. Respirations even and nonlabored. Patient denies any needs at this time. Bed in lowest position. Call light within reach. Will continue to monitor.  Still awaiting urine specimen at this time     Tanya Gillespie RN  06/10/22 4151

## 2022-06-10 NOTE — ED NOTES
[] [place]    [x] [facility]    [x]  [address]       SUICIDE RISK ASSESSMENT      Y  N     [x] [] In the past two weeks have you had thoughts of hurting yourself in any way? [x] [] In the past two weeks have you had thoughts that you would be better off dead? [x] [] Have you made a suicide attempt in the past two months? [x] [] Do you have a plan for hurting yourself or suicide? [x] [] Presence of hallucinations/voices related to hurting himself or herself or someone else. SUICIDE/SECURITY WATCH PRECAUTION CHECKLIST     Orders    [x]  Suicide/Security Watch Precautions initiated as checked below:   6/10/22 3:38 PM EDT 16/16    [x] Notified physician:  Dr. Katerina Turner 6/10/22 3:38 PM EDT    [x] Orders obtained as appropriate:     [x] 1:1 Observer     [] Psych Consult     [] Psych Consult    Name:  Date:  Time:    [x] 1:1 Observer, Notified by:  Feng Adrian RN    Contact Nurse Supervisor    [x] Remove all personal clothes from room and place in snap/paper gown/pants. Slipper only    [x] Remove all personal belongings from room and secured away from patient. Documentation    [x] Initiate Suicide/Security Watch Precaution Flow Sheet    [x] Initiate individualized Care Plan/Problem    [x] Document why precautions initiated on flow sheet (Initiate Nursing Care Plan/Problem)    [x] 1:1 Observer in place; instructions provided. Suicide precautions require observer be within arms length. [x] Nurse-Observer Communication Hand-off initiated by RN, reviewed with Observer. Subsequently used as Hand Off between Observers. [x] Initiate every 15 minute observations per observer as delegated by the RN.     [x] Initiate RN assessment and documentation    Environmental Scan  Search Criteria and Process: OPTIONAL, see Search Policy    [x] Reason for search:    [x] Nursing in presence of second person to search patient    [x] Patient notified of reason for body assessment and belongings search:     Persons present during search: Lori YEPEZ, Gloria YEPEZ, [facility] [employer] and    Results of search and disposition:       Searchers Name: Libby Willingham RN     These items or items similar should be removed from the room:   [x] Chairs   [x] Telephone   [x] Trash cans and liners   [x] Plastic utensils (order Patient Safety tray)   [x] Empty or remove Sharps containers   [x] All personal clothing/belongings removed   [x] All unnecessary lead wires, electrical cords, draw cords, etc.   [x] Flowers and plants   [x] Double check for lighters, matches, razors, any glass items etc that can be used as weapons. Person completing Checklist: Jared Meredith RN       GENERAL INFORMATION     Y  N     [x] [] Has the patient been informed that they are on a watch and what that means? [] [x] Can the patient get out of Bed without nursing assistance? [] [x] Can the patient use the restroom without nursing assistance? [] [x] Can the patient walk the halls to [place] their legs? \"   [] [x] Does the patient have metal utensils? [x] [] Have the patient's belongings been placed out of control of the patient? [x] [] Have the patient and his/her belongings been checked for contraband? [x] [] Is the patient under any visitor restrictions? If Yes, explain: No visitors   [] [x] Is the patient under an alias? [facility] Name:   Authorized visitors (no more than two are to be on the list)   Name/Relationship:   Name/Relationship:    Name of Staff member that you  Received this information from?: Lori YEPEZ    General Description:    Tony Keller 16/16 female 45 y.o. Admission weight: 245 lb (111.1 kg) Height: 5' 6\" (167.6 cm)  Race: [x]  [] Black  []   []   [] [place] [] Other  Facial Hair:  [] Yes  [x] No  If yes, please describe:   Identifying Marks (i.e. Visible tattoos, scars, etc...):  tattoos    NURSING CARE PLAN    Nursing Diagnosis: Risk of Self Directed Harm  [x] Actual  [] Potential  Date Started: 6/10/22      Etiological Factors: (related to)  [x] Expressed or implied suicidal ideation/behavior  [x] Depression  [x] Suicide attempt      [] Low self-esteem  [x] Hallucinations      [x] Feeling of Hopelessness  [] Substance abuse or withdrawal    [] Dysfunctional family  [] Major traumatic event, eg., divorce, etc   [] Excessive stress/anxiety    6/10/22    Expected Outcomes    Patient will:   [x] Patient will remain safe for the duration of their stay   [x] Patient's environment will be safe, eg. Free of potential suicide weapons   [] Verbalize Recovery from suicidal episode and improvement in self-worth   [x] Discuss feeling that precipitated suicide attempt/thoughts/behavior   [] Will describe available resources for crisis prevention and management   [] Will verbalize positive coping skills     Nursing Intervention   [x] Assessment and Observations hourly   [x] Suicide Precautions implemented with patient, should be 1:1 observation   [x] Document observation [id number] and RN assessment hourly   [] Consult physician for:    [] Psychiatric consult    [] Pharmacological therapy    [] Other:    [x] Patient search completed by security   [x] Initiated appropriate safety protocols by removing from the patient's environment anything that could be used to inflict self injury, eg. Order safe tray, snap gown, etc   [x] Maintain open, warm, caring, non-judgmental attitude/manner towards patient   [] Discuss advantages and disadvantages of existing coping methods/skills   [x] Assist and educate patient with identifying present strengths and coping skills   [x] Keep patient informed regarding plan of care and provide clear concise explanations. Provide the patient/family education information as well as telephone numbers and other information about crisis centers, hot lines, and counselors.     Discharge Planning:   [] Referral  [] Groups [] Health agencies  [x] Other: to [facility] for further psychiatric evaluation inpatient     Mariam Phillip RN  06/10/22 7506

## 2022-06-10 NOTE — ED NOTES
Report given to Lia Rosario RN.  All questions answered at this time     Jamila Rhodes RN  06/10/22 2162

## 2022-06-10 NOTE — ED NOTES
The following labs labeled with pt sticker and tubed to lab:     [x] Blue     [x] Lavender   [] on ice  [x] Green/yellow  [] Green/black [] on ice  [x] Yellow  [x] Red  [] Pink      [] COVID-19 swab    [] Rapid  [] PCR  [] Flu swab  [] Peds Viral Panel     [] Urine Sample  [] Pelvic Cultures  [] Blood Cultures      Rasheed Yuan RN  06/10/22 1275

## 2022-06-10 NOTE — ED NOTES
Patient resting comfortably and in no acute distress. Respirations even and nonlabored. Patient denies any needs at this time. Bed in lowest position. Call light within reach. Will continue to monitor.       Deja Coleman RN  06/10/22 9893

## 2022-06-10 NOTE — ED NOTES
Patient accepted to the [facility] for acute psychosis by Dr. Riccardo Jiménez. Patient will go to the [facility], Room #214, at 7:30pm by Terry Mota. RN and patient updated. RN to call report and has transfer packet. Carmelita Tony.  Francesca Lennon[place], [place]  06/10/22 2652

## 2022-06-10 NOTE — ED PROVIDER NOTES
[facility]  ED  Emergency Department Encounter  EmergencyMedicine Resident     Pt Name:Mera Mireles  MRN: [id number]  Armsludagfcarlo 1970  Date of evaluation: 6/10/22  PCP:  Gwendolyn Alvarez DO    CHIEF COMPLAINT       Chief Complaint   Patient presents with    Laceration    Suicidal       HISTORY OF PRESENT ILLNESS  (Location/Symptom, Timing/Onset, Context/Setting, Quality, Duration, Modifying Factors, Severity.)      Mckay Sandoval is a 45 y.o. female who presents with suicidal ideations, auditory hallucinations. Patient with laceration and abrasion to left forearm. Laceration was caused by a back scratcher. Patient stating that she does not know if this was a suicide attempt or not but the voices told her to cut her arm on the back scratcher. Patient also reporting that she has auditory hallucinations of voices telling her to kill her self and to not take her medications. Patient states that the voices are coming through a song which she is listening to on repeat on her phone. Denies any homicidal ideations or visual hallucinations. Denies any other complaints at this time. PAST MEDICAL / SURGICAL / SOCIAL / FAMILY HISTORY      has a past medical history of Cocaine abuse ([facility] [address] has a past surgical history that includes Salpingo-oophorectomy and Dilation and curettage of uterus.       Social History     Socioeconomic History    Marital status: Single     Spouse name: Not on file    Number of children: Not on file    Years of education: Not on file    Highest education level: Not on file   Occupational History    Not on file   Tobacco Use    Smoking status: Current Every Day Smoker     Types: Cigarettes    Smokeless tobacco: Never Used   Substance and Sexual Activity    Alcohol use: Not Currently    Drug use: Not Currently    Sexual activity: Not Currently   Other Topics Concern    Not on file   Social History Narrative    Not on file     Social Determinants of Health powder inhalation Inhale into the lungs every 4 hours as needed for Wheezing or Shortness of Breath    Historical Provider, MD       REVIEW OF SYSTEMS    (2-9 systems for level 4, 10 or more for level 5)      Review of Systems   Constitutional: Negative for fatigue and fever. Respiratory: Negative for cough and shortness of breath. Cardiovascular: Negative for chest pain. Gastrointestinal: Negative for abdominal pain, nausea and vomiting. Genitourinary: Negative for flank pain. Musculoskeletal: Negative for neck pain and neck stiffness. Skin: Positive for wound (laceration of left forearm). Neurological: Negative for syncope, weakness and numbness. Psychiatric/Behavioral: Positive for hallucinations and suicidal ideas. The patient is nervous/anxious. PHYSICAL EXAM   (up to 7 for level 4, 8 or more for level 5)      INITIAL VITALS:   BP (!) 143/86   Pulse 71   Temp 98.1 °F (36.7 °C) (Oral)   Resp 18   Ht 5' 6\" (1.676 m)   Wt 245 lb (111.1 kg)   SpO2 95%   BMI 39.54 kg/m²     Physical Exam  Constitutional:       General: She is not in acute distress. Appearance: She is not toxic-appearing. HENT:      Head: Normocephalic and atraumatic. Cardiovascular:      Rate and Rhythm: Normal rate. Heart sounds: No murmur heard. No friction rub. No gallop. Pulmonary:      Breath sounds: No wheezing, rhonchi or rales. Abdominal:      Tenderness: There is no abdominal tenderness. There is no guarding. Musculoskeletal:      Right lower leg: No edema. Left lower leg: No edema. Skin:     Findings: Lesion (Laceration and abrasion to dorsal aspect of left forearm) present. Neurological:      Mental Status: She is alert. Sensory: No sensory deficit. Motor: No weakness.          DIFFERENTIAL  DIAGNOSIS     PLAN (LABS / IMAGING / EKG):  Orders Placed This Encounter   Procedures    CBC with Auto Differential    Comprehensive Metabolic Panel w/ Reflex to MG    HCG Qualitative, Serum    Urine Drug Screen    Ethanol       MEDICATIONS ORDERED:  Orders Placed This Encounter   Medications    haloperidol lactate (HALDOL) injection 5 mg    DISCONTD: midazolam PF (VERSED) injection 2 mg    DISCONTD: midazolam PF (VERSED) injection 5 mg    DISCONTD: midazolam HCl (VERSED) 10 MG/2ML injection     Quan Poe: cabinet override    midazolam HCl (VERSED) 10 MG/2ML injection     Lori Roberson: cabinet override    midazolam HCl (VERSED) 10 MG/2ML injection 5 mg       DDX: Suicidal ideations, auditory hallucinations, schizophrenia, acute psychosis, bipolar disorder with psychotic features    DIAGNOSTIC RESULTS / EMERGENCY DEPARTMENT COURSE / MDM   LAB RESULTS:  Results for orders placed or performed during the hospital encounter of 06/10/22   CBC with Auto Differential   Result Value Ref Range    WBC 16.2 (H) 3.5 - 11.3 k/uL    RBC 4.94 3.95 - 5.11 m/uL    Hemoglobin 15.1 11.9 - 15.1 g/dL    Hematocrit 45.2 36.3 - 47.1 %    MCV 91.5 82.6 - 102.9 fL    MCH 30.6 25.2 - 33.5 pg    MCHC 33.4 28.4 - 34.8 g/dL    RDW 13.8 11.8 - 14.4 %    Platelets 206 908 - 452 k/uL    MPV 10.1 8.1 - 13.5 fL    NRBC Automated 0.0 0.0 per 100 WBC    Seg Neutrophils 82 (H) 36 - 65 %    Lymphocytes 12 (L) 24 - 43 %    Monocytes 6 3 - 12 %    Eosinophils % 0 (L) 1 - 4 %    Basophils 0 0 - 2 %    Immature Granulocytes 0 0 %    Segs Absolute 13.14 (H) 1.50 - 8.10 k/uL    Absolute Lymph # 1.96 1.10 - 3.70 k/uL    Absolute Mono # 0.96 0.10 - 1.20 k/uL    Absolute Eos # 0.03 0.00 - 0.44 k/uL    Basophils Absolute 0.04 0.00 - 0.20 k/uL    Absolute Immature Granulocyte 0.07 0.00 - 0.30 k/uL   Comprehensive Metabolic Panel w/ Reflex to MG   Result Value Ref Range    Glucose 101 (H) 70 - 99 mg/dL    BUN 7 6 - 20 mg/dL    CREATININE 0.76 0.50 - 0.90 mg/dL    Calcium 9.2 8.6 - 10.4 mg/dL    Sodium 138 135 - 144 mmol/L    Potassium 3.6 (L) 3.7 - 5.3 mmol/L    Chloride 103 98 - 107 mmol/L    CO2 23 20 - 31 mmol/L Anion Gap 12 9 - 17 mmol/L    Alkaline Phosphatase 117 (H) 35 - 104 U/L    ALT 13 5 - 33 U/L    AST 15 <32 U/L    Total Bilirubin 0.33 0.3 - 1.2 mg/dL    Total Protein 6.9 6.4 - 8.3 g/dL    Albumin 4.2 3.5 - 5.2 g/dL    Albumin/Globulin Ratio 1.6 1.0 - 2.5    GFR Non-African American >60 >60 mL/min    GFR African American >60 >60 mL/min    GFR Comment         HCG Qualitative, Serum   Result Value Ref Range    hCG Qual NEGATIVE NEGATIVE       IMPRESSION: 65-year-old female no acute distress presenting with laceration of left forearm and suicidal ideations as well as auditory hallucinations. Patient reports that she is not from the area but has previously been admitted elsewhere for suicide attempts. Patient appears to be actively responding to internal stimuli, rubbing her head and intermittently hitting herself in the face to try to get the voices out of her head. Vital signs within normal limits. Plan for screening labs, social work consult, transfer to [facility]. EMERGENCY DEPARTMENT COURSE:  ED Course as of 06/10/22 1525   Fri Antonio 10, 2022   0939 CBC white count of 16.2, no obvious infectious source at this time. CMP unremarkable, hCG negative. [ZW]   [id number] Patient responding to internal stimuli, was punching herself in the head. Patient stating that she was trying to \"beat the voices out of my head\" [ZW]   1036 Patient was given Haldol. Reassessed and still responding to internal stimuli, still striking herself in the head. Given dose of IM Versed [ZW]   9399 Laceration left forearm repaired with Dermabond. No complications. [ZW]   0175 Patient medically clear for inpatient psychiatric admission [ZW]   1157 Awaiting NISHANT and ethanol level [ZW]      ED Course User Index  [ZW] Darin Flies, DO     Ethanol negative. NISHANT positive for benzodiazepines, otherwise negative.     Medically clear for transfer to [facility]    Procedures  PROCEDURE NOTE - LACERATION CLOSURE    PATIENT NAME: Dottie Harrell Dr RECORD NO. [id number]  DATE: 6/16/2022  ATTENDING PHYSICIAN: Dr. Fatoumata Ashley DIAGNOSIS: Laceration(s) as follows:  -Location: Left forearm  -Length: 3 cm  -Layered closure: No    POSTOPERATIVE DIAGNOSIS:  Same  PROCEDURE PERFORMED:  Suture closure of laceration  PERFORMING PHYSICIAN: Darin Olmedo DO  ANESTHESIA:  Local utilizing  not required  ESTIMATED BLOOD LOSS:  Less than 25 ml. DISCUSSION:  May Montenegro is a 45y.o.-year-old adult. Patient requires laceration repair. The history and physical examination were reviewed and confirmed. CONSENT: The patient provided verbal consent for this procedure. PROCEDURE:  Prior to starting, the procedure and patient were confirmed by those present. The wound area was irrigated with sterile saline and draped in a sterile fashion. The wound area was anesthetized with not required. The wound was explored with the following results No foreign bodies found. The wound was repaired with Dermabond. The wound was dressed with a bandage. All sponge, instrument and needle counts were correct at the completion of the procedure. The patient tolerated the procedure well. SUTURE COUNT:  None    COMPLICATIONS:  None     Darin Olmedo DO  6:56 AM, 6/16/22            CONSULTS:  None    CRITICAL CARE:  Please see attending note    FINAL IMPRESSION      1. Suicidal ideation    2. Auditory hallucinations          DISPOSITION / PLAN     DISPOSITION Decision To Transfer 06/10/2022 11:55:30 AM      PATIENT REFERRED TO:  No follow-up provider specified.     DISCHARGE MEDICATIONS:  New Prescriptions    No medications on file       Darin Olmedo DO  Emergency Medicine Resident    (Please note that portions of thisnote were completed with a voice recognition program.  Efforts were made to edit the dictations but occasionally words are mis-transcribed.)        Darin Olmedo DO  Resident  06/10/22 330 DO Camden Wilcox  06/16/22 [id number]

## 2022-06-10 NOTE — ED NOTES
SW consulted to meet with patient who is in the ER with gestures to harm herself. Patient denies SI/HI, but was whitnesed punching herself in the head and telling staff that she was, \"trying to beat the voices out of her head\". Patient states she is connected to [facility] and has an appointment June 21st.  Patient says she is diagnosed with borderline personality disorder and schizoaffective. Patient prescribed Risperidone, Cogentin, and Zoloft and took them up until today. Patient denies drug/alcohol use or legal issues. Patient not willing to sign a Voluntary, so physician will put patient on a Pink Slip. Patient requests to be called Letty Bennett, as she identifies as a male. Patient medically clear. SW waiting on lab results and will consult psych at that time. Katina Rowell.  [place], [place]  06/10/22 [id number]

## 2022-06-10 NOTE — ED PROVIDER NOTES
[facility] ED  Emergency Department  Emergency Medicine Resident Sign-out     Care of [facility] was assumed from Dr. Luc Caballero and is being seen for Laceration and Suicidal  .  The patient's initial evaluation and plan have been discussed with the prior provider who initially evaluated the patient. EMERGENCY DEPARTMENT COURSE / MEDICAL DECISION MAKING:       MEDICATIONS GIVEN:  Orders Placed This Encounter   Medications    haloperidol lactate (HALDOL) injection 5 mg    DISCONTD: midazolam PF (VERSED) injection 2 mg    DISCONTD: midazolam PF (VERSED) injection 5 mg    DISCONTD: midazolam HCl (VERSED) 10 MG/2ML injection     Brody Necessary: cabinet override    midazolam HCl (VERSED) 10 MG/2ML injection     Jodelle Saint, Nichole: cabinet override    midazolam HCl (VERSED) 10 MG/2ML injection 5 mg       LABS / RADIOLOGY:     Labs Reviewed   CBC WITH AUTO DIFFERENTIAL - Abnormal; Notable for the following components:       Result Value    WBC 16.2 (*)     Seg Neutrophils 82 (*)     Lymphocytes 12 (*)     Eosinophils % 0 (*)     Segs Absolute 13.14 (*)     All other components within normal limits   COMPREHENSIVE METABOLIC PANEL W/ REFLEX TO MG FOR LOW K - Abnormal; Notable for the following components:    Glucose 101 (*)     Potassium 3.6 (*)     Alkaline Phosphatase 117 (*)     All other components within normal limits   URINALYSIS WITH MICROSCOPIC - Abnormal; Notable for the following components:    Leukocyte Esterase, Urine TRACE (*)     All other components within normal limits   URINE DRUG SCREEN - Abnormal; Notable for the following components:    Benzodiazepine Screen, Urine POSITIVE (*)     All other components within normal limits   HCG, SERUM, QUALITATIVE   ETHANOL       XR CHEST PORTABLE    Result Date: 6/10/2022  EXAMINATION: ONE XRAY VIEW OF THE CHEST 6/10/2022 1:10 pm COMPARISON: None.  HISTORY: ORDERING SYSTEM PROVIDED HISTORY: wbc elevated , concern for pna TECHNOLOGIST PROVIDED HISTORY: wbc elevated , concern for pna Reason for Exam: upright port FINDINGS: A portable upright frontal view chest radiograph was obtained. Lung volumes are low. The heart size, mediastinal contour, and pleural spaces are within normal limits for technique. The lungs are grossly clear. There is no focal consolidation or pneumothorax. The pulmonary vascular pattern is within normal limits. No acute thoracic osseous abnormality. Hypoinflated, clear lungs. RECENT VITALS:     Temp: 98.8 °F (37.1 °C),  Heart Rate: 80, Resp: 16, BP: (!) 147/88, SpO2: 95 %      This patient is a 45 y.o. Female with suicidal ideations and auditory hallucinations. Patient states the voices are telling her to not take her meds and to kill herself. Patient also had a laceration to the dorsal aspect of the left forearm which was caused by a back scratcher. Patient states that the voices told her to cut her arm open. Patient pink slipped to [facility], medically clear. She is pending transfer to [facility] at 8:30 PM.      ED Course as of 06/10/22 1704   Fri Antonio 10, 2022   0939 CBC white count of 16.2, no obvious infectious source at this time. CMP unremarkable, hCG negative. [ZW]   [id number] Patient responding to internal stimuli, was punching herself in the head. Patient stating that she was trying to \"beat the voices out of my head\" [ZW]   1036 Patient was given Haldol. Reassessed and still responding to internal stimuli, still striking herself in the head. Given dose of IM Versed [ZW]   3950 Laceration left forearm repaired with Dermabond. No complications. [ZW]   1702 Patient medically clear for inpatient psychiatric admission [ZW]   1157 Awaiting NISHANT and ethanol level [ZW]      ED Course User Index  [ZW] Lisa Steven,        OUTSTANDING TASKS / RECOMMENDATIONS:    1. Transport 830 pm     FINAL IMPRESSION:     1. Suicidal ideation    2.  Auditory hallucinations        DISPOSITION:         DISPOSITION:  []  Discharge []  Transfer -    []  Admission -     []  Against Medical Advice   []  Eloped   FOLLOW-UP: No follow-up provider specified.    DISCHARGE MEDICATIONS: New Prescriptions    No medications on file          Aruna Munson DO  Emergency Medicine Resident  [facility]  Resident  06/13/22 4642

## 2022-06-11 PROBLEM — S61.412A LACERATION OF LEFT HAND WITHOUT FOREIGN BODY: Status: ACTIVE | Noted: 2022-06-11

## 2022-06-11 PROBLEM — F14.10 COCAINE ABUSE (HCC): Status: ACTIVE | Noted: 2022-06-11

## 2022-06-11 PROBLEM — I10 ESSENTIAL HYPERTENSION: Status: ACTIVE | Noted: 2022-06-11

## 2022-06-11 PROBLEM — F25.1 SCHIZOAFFECTIVE DISORDER, DEPRESSIVE TYPE (HCC): Status: ACTIVE | Noted: 2020-11-20

## 2022-06-11 PROBLEM — F10.10 ALCOHOL ABUSE: Status: ACTIVE | Noted: 2022-06-11

## 2022-06-11 PROBLEM — F60.3 BORDERLINE PERSONALITY DISORDER (HCC): Status: ACTIVE | Noted: 2022-06-11

## 2022-06-11 PROBLEM — E78.2 MIXED HYPERLIPIDEMIA: Status: ACTIVE | Noted: 2022-06-11

## 2022-06-11 PROBLEM — E66.01 MORBID OBESITY (HCC): Status: ACTIVE | Noted: 2022-06-11

## 2022-06-11 PROBLEM — F10.20 ALCOHOLISM (HCC): Status: ACTIVE | Noted: 2022-06-11

## 2022-06-11 PROCEDURE — 2500000003 HC RX 250 WO HCPCS

## 2022-06-11 PROCEDURE — 99223 1ST HOSP IP/OBS HIGH 75: CPT | Performed by: INTERNAL MEDICINE

## 2022-06-11 PROCEDURE — APPSS180 APP SPLIT SHARED TIME > 60 MINUTES

## 2022-06-11 PROCEDURE — 99223 1ST HOSP IP/OBS HIGH 75: CPT | Performed by: PSYCHIATRY & NEUROLOGY

## 2022-06-11 PROCEDURE — 1240000000 HC EMOTIONAL WELLNESS R&B

## 2022-06-11 PROCEDURE — 6370000000 HC RX 637 (ALT 250 FOR IP): Performed by: PSYCHIATRY & NEUROLOGY

## 2022-06-11 RX ORDER — RISPERIDONE 1 MG/1
1 TABLET, FILM COATED ORAL 2 TIMES DAILY
Status: DISCONTINUED | OUTPATIENT
Start: 2022-06-11 | End: 2022-06-14 | Stop reason: HOSPADM

## 2022-06-11 RX ORDER — HALOPERIDOL 5 MG/ML
5 INJECTION INTRAMUSCULAR EVERY 6 HOURS PRN
Status: DISCONTINUED | OUTPATIENT
Start: 2022-06-11 | End: 2022-06-11

## 2022-06-11 RX ORDER — LORAZEPAM 2 MG/ML
2 INJECTION INTRAMUSCULAR EVERY 6 HOURS PRN
Status: DISCONTINUED | OUTPATIENT
Start: 2022-06-11 | End: 2022-06-11

## 2022-06-11 RX ORDER — HALOPERIDOL 5 MG
5 TABLET ORAL EVERY 6 HOURS PRN
Status: DISCONTINUED | OUTPATIENT
Start: 2022-06-11 | End: 2022-06-11

## 2022-06-11 RX ORDER — MELOXICAM 7.5 MG/1
15 TABLET ORAL DAILY
Status: DISCONTINUED | OUTPATIENT
Start: 2022-06-11 | End: 2022-06-14 | Stop reason: HOSPADM

## 2022-06-11 RX ORDER — CHLORPROMAZINE HYDROCHLORIDE 25 MG/ML
50 INJECTION INTRAMUSCULAR EVERY 6 HOURS PRN
Status: DISCONTINUED | OUTPATIENT
Start: 2022-06-11 | End: 2022-06-14 | Stop reason: HOSPADM

## 2022-06-11 RX ORDER — SERTRALINE HYDROCHLORIDE 100 MG/1
100 TABLET, FILM COATED ORAL DAILY
Status: DISCONTINUED | OUTPATIENT
Start: 2022-06-11 | End: 2022-06-14 | Stop reason: HOSPADM

## 2022-06-11 RX ORDER — FUROSEMIDE 20 MG/1
20 TABLET ORAL DAILY
Status: DISCONTINUED | OUTPATIENT
Start: 2022-06-11 | End: 2022-06-14 | Stop reason: HOSPADM

## 2022-06-11 RX ORDER — DIPHENHYDRAMINE HYDROCHLORIDE 50 MG/ML
50 INJECTION INTRAMUSCULAR; INTRAVENOUS EVERY 6 HOURS PRN
Status: DISCONTINUED | OUTPATIENT
Start: 2022-06-11 | End: 2022-06-11

## 2022-06-11 RX ORDER — LORAZEPAM 1 MG/1
2 TABLET ORAL EVERY 6 HOURS PRN
Status: DISCONTINUED | OUTPATIENT
Start: 2022-06-11 | End: 2022-06-11

## 2022-06-11 RX ORDER — BENZTROPINE MESYLATE 0.5 MG/1
0.5 TABLET ORAL 2 TIMES DAILY
Status: DISCONTINUED | OUTPATIENT
Start: 2022-06-11 | End: 2022-06-14 | Stop reason: HOSPADM

## 2022-06-11 RX ORDER — ALBUTEROL SULFATE 90 UG/1
2 AEROSOL, METERED RESPIRATORY (INHALATION) EVERY 6 HOURS PRN
Status: DISCONTINUED | OUTPATIENT
Start: 2022-06-11 | End: 2022-06-14 | Stop reason: HOSPADM

## 2022-06-11 RX ORDER — CHLORPROMAZINE HYDROCHLORIDE 25 MG/1
50 TABLET, FILM COATED ORAL EVERY 6 HOURS PRN
Status: DISCONTINUED | OUTPATIENT
Start: 2022-06-11 | End: 2022-06-14 | Stop reason: HOSPADM

## 2022-06-11 RX ORDER — ATORVASTATIN CALCIUM 20 MG/1
20 TABLET, FILM COATED ORAL NIGHTLY
Status: DISCONTINUED | OUTPATIENT
Start: 2022-06-11 | End: 2022-06-14 | Stop reason: HOSPADM

## 2022-06-11 RX ADMIN — RISPERIDONE 1 MG: 1 TABLET ORAL at 12:49

## 2022-06-11 RX ADMIN — FUROSEMIDE 20 MG: 20 TABLET ORAL at 12:48

## 2022-06-11 RX ADMIN — SERTRALINE HYDROCHLORIDE 100 MG: 100 TABLET ORAL at 12:48

## 2022-06-11 RX ADMIN — MELOXICAM 15 MG: 7.5 TABLET ORAL at 12:49

## 2022-06-11 RX ADMIN — BENZTROPINE MESYLATE 0.5 MG: 0.5 TABLET ORAL at 12:49

## 2022-06-11 RX ADMIN — CHLORPROMAZINE HYDROCHLORIDE 50 MG: 25 INJECTION INTRAMUSCULAR at 18:20

## 2022-06-11 RX ADMIN — CHLORPROMAZINE HYDROCHLORIDE 50 MG: 25 INJECTION INTRAMUSCULAR at 10:42

## 2022-06-11 ASSESSMENT — PAIN SCALES - GENERAL: PAINLEVEL_OUTOF10: 4

## 2022-06-11 NOTE — BH NOTE
BEHAVIORAL SERVICES: One - Hour In- Person Review  For Management of Violent or Self - Destructive Behavior     Seclusion/Restraint: Physical Hold x 10 seconds    Reason for Intervention: Patient required PRN IM's due to self harm  Response to Intervention: Patient refusing PRN IM's    Medical Record reviewed and discussed precipitating events/behaviors with RN initiating Intervention:   Yes     Patient Medical Status:  Vital Signs: refused  Respiratory Status: no acute distress noted  Circulatory Status: visually appears normal  Skin Integrity: no notable areas  Orientation: oriented to person, place, time/date and situation     Mood/Affect: Anxious  Irritability     Speech: loud and pressured     Thought Content: Preoccupied about belongings  Thought Processes: Goal-Directed     Rationale for continued use of intervention: Patient continues to harm self    Rationale for discontinuing intervention: Patient is able to: remain in control, follow directions.      One Hour Review Evaluation Physician Notification: yes

## 2022-06-11 NOTE — PROGRESS NOTES
Behavioral Services  Medicare Certification Upon Admission    I certify that this patient's inpatient psychiatric hospital admission is medically necessary for:    [x] (1) Treatment which could reasonably be expected to improve this patient's condition,       [x] (2) Or for diagnostic study;     AND     [x](2) The inpatient psychiatric services are provided while the individual is under the care of a physician and are included in the individualized plan of care.     Estimated length of stay/service 3-5 days    Plan for post-hospital care home with outpatient [facility] f/u    Electronically signed by Usama Alfaro MD on 6/11/2022 at 12:01 PM

## 2022-06-11 NOTE — BH NOTE
Emergency PRN Medication Administration Note:      Patient is Dangerous as evidence by attempting to cut self with fork and scratch open previous wound. Patient stated, \"I want to kill myself. \" Staff attempted to find and relieve the distress by Talking to patient, Refocusing on new activity, Offering suggestions, Checking for undiagnosed pain and Administer PRN medications Patient is currently  Continuing to escalate. SOS and security on unit. Patient continued to escalate a physical hold of 10 seconds initiated at 1041 and ended at 1042 . Medication Administered as prescribed:Thorazine 50mg IM. Patient Tolerated medication administration. Will continue to monitor, offer support, and reassess.

## 2022-06-11 NOTE — BH NOTE
Emergency Medication Follow-Up Note:    PRN medication of Thorazine 50mg IM was effective as evidence by patient regaining behavioral control. Patient denies medication side effects. Will continue to monitor and provide support as needed.

## 2022-06-11 NOTE — BH NOTE
Emergency Medication Follow-Up Note:    PRN medication of Thorazine 50 mg IM  was effective as evidence by patient resting in bed, absence of behavior warranting emergency medication. Patient denies medication side effects. Will continue to monitor and provide support as needed.
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
## 2022-06-11 NOTE — BH NOTE
Patient was discussed by staff with Provider. Provider discontinued the 1:1 and declined line of sight. Staff will continue with every 15 min checks to maintain patient safety.

## 2022-06-11 NOTE — PLAN OF CARE
[facility]  Initial Interdisciplinary Treatment Plan NO      Original treatment plan Date & Time: 6/11/2022   0806    Admission Type:  Admission Type: Involuntary    Reason for admission:   Reason for Admission: increase in suicidal thoughts no specific plan increase in auditory hallucinations non compliance with medications    Estimated Length of Stay:  5-7days  Estimated Discharge Date: to be determined by physician    PATIENT STRENGTHS:  Patient Strengths:   Patient Strengths and Limitations:   Addictive Behavior: Addictive Behavior  In the Past 3 Months, Have You Felt or Has Someone Told You That You Have a Problem With  : None  Medical Problems:  Past Medical History:   Diagnosis Date    Cocaine abuse ([facility] [address]      Status EXAM:Mental Status and Behavioral Exam  Normal: No  Level of Assistance: Independent/Self  Facial Expression: Avoids Gaze,Flat  Affect: Blunt  Level of Consciousness: Alert  Frequency of Checks: 4 times per hour, close  Mood:Normal: No  Mood: Depressed,Anxious,Labile  Motor Activity:Normal: No  Motor Activity: Decreased  Eye Contact: Poor  Observed Behavior: Withdrawn,Guarded  Sexual Misconduct History: Past - no  Preception: [place] to person,[place] to place,[place] to time  Attention:Normal: No  Attention: Distractible  Thought Processes: Blocking  Thought Content:Normal: No  Thought Content: Poverty of content  Depression Symptoms: Isolative,Loss of interest,Increased irritability,Impaired concentration  Anxiety Symptoms: Generalized  Shirley Symptoms: Labile  Hallucinations:  Auditory (comment),Command (comment)  Delusions: No  Memory:Normal: No  Memory: Poor recent  Insight and Judgment: No  Insight and Judgment: Poor judgment,Poor insight    EDUCATION:   Learner Progress Toward Treatment Goals: reviewed group plans and strategies for care    Method:group therapy, medication compliance, individualized assessments and care planning    Outcome: needs reinforcement    PATIENT GOALS: to be discussed with patient within 72 hours    PLAN/TREATMENT RECOMMENDATIONS:     continue group therapy , medications compliance, goal setting, individualized assessments and care, continue to monitor pt on unit      SHORT-TERM GOALS:   Time frame for Short-Term Goals: 5-7 days    LONG-TERM GOALS:  Time frame for Long-Term Goals: 6 months  Members Present in Team Meeting: See Signature Sheet    TREVIN Lemus

## 2022-06-11 NOTE — H&P
[facility]  Suha Angulo MD; Ke Perales MD; Juju Sultana MD; MD Norm Aragon MD; MD [facility]   [address] / HISTORY AND PHYSICAL EXAMINATION            Date:   6/11/2022  Patient name:  Yohana Garcia  Date of admission:  6/10/2022  9:16 PM  MRN:   [id number]  Account:  [de-identified]  YOB: 1970  PCP:    Corine Gonzalez DO  Room:   [address] [place] [address]  Code Status:    Full Code    Physician Requesting Consult: Varun Gastelum MD    Reason for Consult: Medical management    Chief Complaint:     No chief complaint on file. Suicidal ideations    History Obtained From:     patient    History of Present Illness:     50-year-old female with underlying history of schizoaffective disorder borderline personality disorder, PTSD, substance abuse disorder, gender dysphoria, morbid obesity, hypertension, asthma, smoking, some history of alcoholism, admitted to inpatient psych for suicidal ideations. Past Medical History:     Past Medical History:   Diagnosis Date    Cocaine abuse [facility])         Past Surgical History:     Past Surgical History:   Procedure Laterality Date    DILATION AND CURETTAGE OF UTERUS      SALPINGO-OOPHORECTOMY          Medications Prior to Admission:     Prior to Admission medications    Medication Sig Start Date End Date Taking?  Authorizing Provider   meloxicam (MOBIC) 15 MG tablet Take 15 mg by mouth daily 1/24/22 6/26/22 Yes Historical Provider, MD   risperiDONE (RISPERDAL) 1 MG tablet Take 1 mg by mouth 2 times daily 6/7/22 7/7/22 Yes Historical Provider, MD   furosemide (LASIX) 20 MG tablet Take 20 mg by mouth daily 5/2/22 7/7/22 Yes Historical Provider, MD   atorvastatin (LIPITOR) 20 MG tablet Take 20 mg by mouth nightly 6/7/22 9/5/22 Yes Historical Provider, MD   ziprasidone (GEODON) 40 MG capsule Take 1 capsule by mouth 2 times daily (with meals) 11/23/20   Chantale Arechiga MD   topiramate (TOPAMAX) 25 MG tablet Take 25 mg by mouth 2 times daily    Historical Provider, MD   benztropine (COGENTIN) 0.5 MG tablet Take 0.5 mg by mouth 2 times daily    Historical Provider, MD   sertraline (ZOLOFT) 100 MG tablet Take 50 mg by mouth daily     Historical Provider, MD   albuterol sulfate (PROAIR RESPICLICK) 459 (90 Base) MCG/ACT aerosol powder inhalation Inhale into the lungs every 4 hours as needed for Wheezing or Shortness of Breath    Historical Provider, MD        Allergies:     Haldol [haloperidol], Pcn [penicillins], Sulfa antibiotics, and Tegretol [carbamazepine]    Social History:     Tobacco:    reports that she has been smoking cigarettes. She has never used smokeless tobacco.  Alcohol:      reports previous alcohol use. Drug Use:  Drug: Cocaine. Family History:     History reviewed. No pertinent family history. Review of Systems:     Positive and Negative as described in HPI. CONSTITUTIONAL:  negative for fevers, chills, sweats, fatigue, weight loss  HEENT:  negative for vision, hearing changes, runny nose, throat pain  RESPIRATORY:  negative for shortness of breath, cough, congestion, wheezing. CARDIOVASCULAR:  negative for chest pain, palpitations.   GASTROINTESTINAL:  negative for nausea, vomiting, diarrhea, constipation, change in bowel habits, abdominal pain   GENITOURINARY:  negative for difficulty of urination, burning with urination, frequency   INTEGUMENT:  negative for rash, skin lesions, easy bruising   HEMATOLOGIC/LYMPHATIC:  negative for swelling/edema   ALLERGIC/IMMUNOLOGIC:  negative for urticaria , itching  ENDOCRINE:  negative increase in drinking, increase in urination, hot or cold intolerance  MUSCULOSKELETAL:  negative joint pains, muscle aches, swelling of joints  NEUROLOGICAL:  negative for headaches, dizziness, lightheadedness, numbness, pain, tingling extremities    Physical Exam:     Resp 16   Ht 5' 6\" (1.676 m)   Wt 245 lb (111.1 kg)   BMI 39.54 kg/m²   No data recorded. No results for input(s): POCGLU in the last 72 hours. No intake or output data in the 24 hours ending 06/11/22 1806    General Appearance:  alert, well appearing, and in no acute distress  Mental status: oriented to person, place, and time with normal affect  Head:  normocephalic, atraumatic. Eye: no icterus, redness, pupils equal and reactive, extraocular eye movements intact, conjunctiva clear  Ear: normal external ear, no discharge, hearing intact  Nose:  no drainage noted  Mouth: mucous membranes moist  Neck: supple, no carotid bruits, thyroid not palpable  Lungs: Bilateral equal air entry, clear to ausculation, no wheezing, rales or rhonchi, normal effort  Cardiovascular: normal rate, regular rhythm, no murmur, gallop, rub. Abdomen: Soft, nontender, nondistended, normal bowel sounds, no hepatomegaly or splenomegaly  Neurologic: There are no new focal motor or sensory deficits, normal muscle tone and bulk, no abnormal sensation, normal speech, cranial nerves II through XII grossly intact  Skin: No gross lesions, rashes, bruising or bleeding on exposed skin area  Extremities:  peripheral pulses palpable, no pedal edema or calf pain with palpation  Psych: normal affect    Investigations:      Laboratory Testing:  No results found for this or any previous visit (from the past 24 hour(s)). Imaging/Diagonst[employer]:  XR CHEST PORTABLE    Result Date: 6/10/2022  Hypoinflated, clear lungs.        Assessment :      Hospital Problems           Last Modified POA    * (Principal) Schizoaffective disorder, depressive type ([address] 6/11/2022 Yes    Acute psychosis ([address] 6/11/2022 Yes    Borderline personality disorder ([address] 6/11/2022 Yes    Cocaine abuse ([address] 6/11/2022 Yes    Alcohol abuse 6/11/2022 Yes    Morbid obesity ([address] 6/11/2022 Yes    Alcoholism ([address] 6/11/2022 Yes    Mixed hyperlipidemia 6/11/2022 Yes    Essential hypertension 6/11/2022 Yes    Laceration of left hand without foreign body 6/11/2022 Yes          Plan:     3 48-year-old female with underlying history of schizoaffective disorder, major depression, admitted to inpatient psych for suicidal ideations,  2. Acute psychosis,  3. Polysubstance abuse, watch for any withdrawal,  4. Alcohol abuse, watch for withdrawal,  5. Mixed hyperlipidemia, continue statins,  6. Essential hypertension, continue to monitor blood pressure and continue home medications,  7. Left hand injury self-inflicted, keep the clean dressing on the wound if get infected will start antibiotics,  8. Morbid obesity, low-calorie diet,  9. DVT prophylaxis, patient mobile,  10. Full CODE STATUS    Consultations:   IP CONSULT TO INTERNAL MEDICINE      Sarah Lan MD  6/11/2022  6:06 PM    Copy sent to Dr. Snow Session, DO    Please note that this chart was generated using voice recognition Dragon dictation software. Although every effort was made to ensure the accuracy of this automated transcription, some errors in transcription may have occurred.

## 2022-06-11 NOTE — BH NOTE
BEHAVIORAL SERVICES: One - Hour In- Person Review  For Management of Violent or Self - Destructive Behavior     Seclusion/Restraint: Physical Hold x 10 seconds    Reason for Intervention: Patient required PRN IM's due to slamming door, pounding in room  Response to Intervention: Patient refusing PRN IM's    Medical Record reviewed and discussed precipitating events/behaviors with RN initiating Intervention:  Yes     Patient Medical Status:  Vital Signs: refused  Respiratory Status: no acute distress noted  Circulatory Status: visually appears normal  Skin Integrity: no notable areas  Orientation: oriented to person, place, time/date and situation     Mood/Affect: Anxious  Irritability,      Speech: loud and pressured     Thought Content: Preoccupied about belongings  Thought Processes: Goal-Directed     Rationale for continued use of intervention: Patient continues to slam door, pound in room  Rationale for discontinuing intervention: Patient is able to: remain in control, follow directions.      One Hour Review Evaluation Physician Notification: yes

## 2022-06-11 NOTE — ED NOTES
[facility] called [facility] for updated ETA. . [facility] reports 0830 as new ETA.      Bri Roca, [facility]  06/10/22 2001

## 2022-06-11 NOTE — BH NOTE
Patient given tobacco quitline number 17780074343 at this time, refusing to call at this time, states \" I just dont want to quit now\"- patient given information as to the dangers of long term tobacco use. Continue to reinforce the importance of tobacco cessation.

## 2022-06-11 NOTE — BH NOTE
Staff went into patient's room and found patient trying to self harm with a plastic fork. Patient told NP that he was going to kill himself and he can use anything in his room. Charge nurse notified. Provider notified. 1:1 initiated.

## 2022-06-11 NOTE — BH NOTE
Emergency PRN Medication Administration Note: Initiation of physical hold lasting for 10 seconds. Patient is Agitated and Dangerous as evidence by patient slamming door, pounding in his room. Patient is demanding a medical bed, and state if he does not get a medical bed, \"I'm not going to eat, or take any medications\". Staff attempted to find and relieve the distress by Talking to patient, Refocusing on new activity and Offering suggestions Patient is currently  continuing to escalate. Continues to do self-harm. Security and [facility] was notified. Medication Administered as prescribed: Thorazine 50 mg IM. Physical hold required for 10 seconds, starting at 1820. Dr. Bertha Mcguire was notified. Patient currently in room resting. Will continue to monitor, offer support, and reassess.

## 2022-06-11 NOTE — BH NOTE
[facility]  Admission Note     Admission Type:   Admission Type: Involuntary    Reason for admission:  Reason for Admission: increase in suicidal thoughts no specific plan increase in auditory hallucinations non compliance with medications    PATIENT STRENGTHS:       Patient Strengths and Limitations:       Addictive Behavior:   Addictive Behavior  In the Past 3 Months, Have You Felt or Has Someone Told You That You Have a Problem With  : None    Medical Problems:   Past Medical History:   Diagnosis Date    Cocaine abuse ([facility] [address]        Status EXAM:  Mental Status and Behavioral Exam  Normal: No  Level of Assistance: Independent/Self  Facial Expression: Avoids Gaze,Flat  Affect: Blunt  Level of Consciousness: Alert  Frequency of Checks: 4 times per hour, close  Mood:Normal: No  Mood: Depressed,Anxious,Labile  Motor Activity:Normal: No  Motor Activity: Decreased  Eye Contact: Poor  Observed Behavior: Withdrawn,Guarded  Sexual Misconduct History: Past - no  Preception: [place] to person,[place] to place,[place] to time  Attention:Normal: No  Attention: Distractible  Thought Processes: Blocking  Thought Content:Normal: No  Thought Content: Poverty of content  Depression Symptoms: Isolative,Loss of interest,Increased irritability,Impaired concentration  Anxiety Symptoms: Generalized  Shirley Symptoms: Labile  Hallucinations:  Auditory (comment),Command (comment)  Delusions: No  Memory:Normal: No  Memory: Poor recent  Insight and Judgment: No  Insight and Judgment: Poor judgment,Poor insight    Tobacco Screening:  Practical Counseling, on admission, hakeem X, if applicable and completed (first 3 are required if patient doesn't refuse):            ( )  Recognizing danger situations (included triggers and roadblocks)                    ( )  Coping skills (new ways to manage stress, exercise, relaxation techniques, changing routine, distraction)                                                           ( )  Basic information about quitting (benefits of quitting, techniques in how to quit, available resources  ( ) Referral for counseling faxed to [facility]                                           (X ) Patient refused counseling  ( ) Patient has not smoked in the last 30 days    Metabolic Screening:    No results found for: LABA1C    No results found for: CHOL  No results found for: TRIG  No results found for: HDL  No components found for: LDLCAL  No results found for: LABVLDL      Body mass index is 39.54 kg/m². BP Readings from Last 2 Encounters:   06/10/22 (!) 147/88   11/23/20 (!) 124/93           Pt admitted with followings belongings:  Dental Appliances: None  Vision - Corrective Lenses: None  Hearing Aid: None  Jewelry: Necklace,Bracelet  Body Piercings Removed: N/A  Clothing: Footwear,Shorts,Shirt,Socks,Undergarments  Other Valuables: [address] [employer]     Patient's home medications were need verified. Patient oriented to surroundings and program expectations and copy of patient rights given. Received admission packet:  yes. Consents reviewed, signed NO. Refused to sign any admission paperwork or answer most assessment questions. Patient verbalize understanding:  yes. Patient education on precautions: yes    PT admitted to unit and wanded for safety. PT very anxious upon admission per EMS report pt tried to elope on transport. PT went to [facility] due to increase in suicidal thoughts and command hallucinations to harm him self. PT denies any current plan and does not answer many questions. PT does admit to using crack cocaine 2 weeks ago denies current alcohol use. PT states he has housing but is unable or refusing to say where. PT appears to have brought all his belongings with him. PT states he was recently in [place] but left and before that was in [facility] [employer].                      Que Morrison RN
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
## 2022-06-12 PROCEDURE — 99232 SBSQ HOSP IP/OBS MODERATE 35: CPT

## 2022-06-12 PROCEDURE — 6370000000 HC RX 637 (ALT 250 FOR IP)

## 2022-06-12 PROCEDURE — 1240000000 HC EMOTIONAL WELLNESS R&B

## 2022-06-12 PROCEDURE — 6370000000 HC RX 637 (ALT 250 FOR IP): Performed by: PSYCHIATRY & NEUROLOGY

## 2022-06-12 RX ORDER — HYDROXYZINE HYDROCHLORIDE 25 MG/1
25 TABLET, FILM COATED ORAL 3 TIMES DAILY PRN
Status: DISCONTINUED | OUTPATIENT
Start: 2022-06-12 | End: 2022-06-14 | Stop reason: HOSPADM

## 2022-06-12 RX ADMIN — BENZTROPINE MESYLATE 0.5 MG: 0.5 TABLET ORAL at 08:11

## 2022-06-12 RX ADMIN — BENZTROPINE MESYLATE 0.5 MG: 0.5 TABLET ORAL at 20:36

## 2022-06-12 RX ADMIN — ATORVASTATIN CALCIUM 20 MG: 20 TABLET, FILM COATED ORAL at 20:36

## 2022-06-12 RX ADMIN — RISPERIDONE 1 MG: 1 TABLET ORAL at 08:11

## 2022-06-12 RX ADMIN — FUROSEMIDE 20 MG: 20 TABLET ORAL at 08:11

## 2022-06-12 RX ADMIN — RISPERIDONE 1 MG: 1 TABLET ORAL at 20:36

## 2022-06-12 RX ADMIN — MELOXICAM 15 MG: 7.5 TABLET ORAL at 08:12

## 2022-06-12 RX ADMIN — SERTRALINE HYDROCHLORIDE 100 MG: 100 TABLET ORAL at 08:11

## 2022-06-12 RX ADMIN — HYDROXYZINE HYDROCHLORIDE 25 MG: 25 TABLET, FILM COATED ORAL at 12:38

## 2022-06-12 NOTE — PLAN OF CARE
Problem: Self Harm/Suicidality  Goal: Will have no self-injury during hospital stay  Description: INTERVENTIONS:  1. Q 30 MINUTES: Routine safety checks  2. Q SHIFT & PRN: Assess risk to determine if routine checks are adequate to maintain patient safety  6/12/2022 1023 by Hu Chandra LPN  Outcome: Progressing     Problem: Psychosis  Goal: Will report no hallucinations or delusions  Description: INTERVENTIONS:  1. Administer medication as  ordered  2. Assist with reality testing to support increasing orientation  3. Assess if patient's hallucinations or delusions are encouraging self harm or harm to others and intervene as appropriate  6/12/2022 1023 by Hu Chandra LPN  Outcome: Progressing   Patient has been social with select peers, denies intent to harm self at this time, remains free from self harm at this time.  Support and encouragement provided

## 2022-06-12 NOTE — PROGRESS NOTES
Daily Progress Note  6/12/2022    Patient Name: Umberto Everett: Suicidal ideation and acute psychosis         SUBJECTIVE:      Patient is seen today for a follow up assessment. Patient has been compliant with scheduled medication at this time. Patient has required as needed Thorazine multiple times in the last 24 hours. When approached interview patient presents with poor insight into reasons for medication menstruation. Patient states depression continues as significant however suicidal ideations are improving at this time. Patient states he has been messing with the cut on his arm however states he is not trying to harm himself at this time. Patient states that he cuts himself in the community because the sight of blood being released from his body feels like \"frustration being released\". Patient states the more blood he sees more relief he feels. Patient states only coping skill currently is ignoring the situation and pretending like it does not exist.  Patient currently endorses command auditory hallucinations telling him to harm himself. Patient is unable to contract for safety outside the hospital. Writer encouraged patient to attend groups on the unit. At this time, the patient is not appropriate for a lower level of care. There is risk of decompensation and patient warrants further hospitalization for safety and stabilization. Appetite:  [x] Adequate/Unchanged  [] Increased  [] Decreased      Sleep:       [x] Adequate/Unchanged  [] Fair  [] Poor      Group Attendance on Unit:   [x] Yes   [] Selectively    [] No    Medication Side Effects: Denies         Mental Status Exam  Level of consciousness: Alert and awake. Appearance: Appropriate attire for setting, seated in chair, with fair  grooming and hygiene. Behavior/Motor: Approachable, compliant with interview  Attitude toward examiner: Cooperative, attentive, good eye contact.   Speech: normal rate and normal volume   Mood: Patient reports \" great\". Affect: Reactive  Thought processes: Linear, coherent and illogical.   Thought content: Denies homicidal ideation. Suicidal Ideation: Reports improvement in suicidal ideations, without current intent to harm self on unit. Unable to contract for safety off unit. Delusions: No evidence of delusions. Denies paranoia. Perceptual Disturbance: Patient does not appear to be responding to internal stimuli. Denies auditory hallucinations. Denies visual hallucinations. Cognition: Oriented to self, location, time, and situation. Memory: Intact. Insight & Judgement: Poor. Data   height is 5' 6\" (1.676 m) and weight is 245 lb (111.1 kg). Her oral temperature is 97.6 °F (36.4 °C). Her blood pressure is 103/62 and her pulse is 73. Her respiration is 14. Labs:   No visits with results within 2 Day(s) from this visit.    Latest known visit with results is:   Admission on 06/10/2022, Discharged on 06/10/2022   Component Date Value Ref Range Status    WBC 06/10/2022 16.2* 3.5 - 11.3 k/uL Final    RBC 06/10/2022 4.94  3.95 - 5.11 m/uL Final    Hemoglobin 06/10/2022 15.1  11.9 - 15.1 g/dL Final    Hematocrit 06/10/2022 45.2  36.3 - 47.1 % Final    MCV 06/10/2022 91.5  82.6 - 102.9 fL Final    MCH 06/10/2022 30.6  25.2 - 33.5 pg Final    MCHC 06/10/2022 33.4  28.4 - 34.8 g/dL Final    RDW 06/10/2022 13.8  11.8 - 14.4 % Final    Platelets 99/60/0860 277  138 - 453 k/uL Final    MPV 06/10/2022 10.1  8.1 - 13.5 fL Final    NRBC Automated 06/10/2022 0.0  0.0 per 100 WBC Final    Seg Neutrophils 06/10/2022 82* 36 - 65 % Final    Lymphocytes 06/10/2022 12* 24 - 43 % Final    Monocytes 06/10/2022 6  3 - 12 % Final    Eosinophils % 06/10/2022 0* 1 - 4 % Final    Basophils 06/10/2022 0  0 - 2 % Final    Immature Granulocytes 06/10/2022 0  0 % Final    Segs Absolute 06/10/2022 13.14* 1.50 - 8.10 k/uL Final    Absolute Lymph # 06/10/2022 1.96  1.10 - 3.70 k/uL Final    Absolute Mono # 06/10/2022 0.96  0.10 - 1.20 k/uL Final    Absolute Eos # 06/10/2022 0.03  0.00 - 0.44 k/uL Final    Basophils Absolute 06/10/2022 0.04  0.00 - 0.20 k/uL Final    Absolute Immature Granulocyte 06/10/2022 0.07  0.00 - 0.30 k/uL Final    Glucose 06/10/2022 101* 70 - 99 mg/dL Final    BUN 06/10/2022 7  6 - 20 mg/dL Final    CREATININE 06/10/2022 0.76  0.50 - 0.90 mg/dL Final    Calcium 06/10/2022 9.2  8.6 - 10.4 mg/dL Final    Sodium 06/10/2022 138  135 - 144 mmol/L Final    Potassium 06/10/2022 3.6* 3.7 - 5.3 mmol/L Final    Chloride 06/10/2022 103  98 - 107 mmol/L Final    CO2 06/10/2022 23  20 - 31 mmol/L Final    Anion Gap 06/10/2022 12  9 - 17 mmol/L Final    Alkaline Phosphatase 06/10/2022 117* 35 - 104 U/L Final    ALT 06/10/2022 13  5 - 33 U/L Final    AST 06/10/2022 15  <32 U/L Final    Total Bilirubin 06/10/2022 0.33  0.3 - 1.2 mg/dL Final    Total Protein 06/10/2022 6.9  6.4 - 8.3 g/dL Final    Albumin 06/10/2022 4.2  3.5 - 5.2 g/dL Final    Albumin/Globulin Ratio 06/10/2022 1.6  1.0 - 2.5 Final    GFR Non- 06/10/2022 >60  >60 mL/min Final    GFR  06/10/2022 >60  >60 mL/min Final    GFR Comment 06/10/2022        Final    Comment: Average GFR for 52-63 years old:   80 mL/min/1.73sq m  Chronic Kidney Disease:   <60 mL/min/1.73sq m  Kidney failure:   <15 mL/min/1.73sq m              eGFR calculated using average adult body mass. Additional eGFR calculator available at:        [employer]            hCG Qual 06/10/2022 NEGATIVE  NEGATIVE Final    Comment: Specimens with hCG levels near the threshold of the test (25 mIU/mL) may give a negative or   indeterminate result. In such cases, another test should be performed with a new specimen   in 48-72 hours. If early pregnancy is suspected clinically in this setting, correlation   with quantitative serum b-hCG level is suggested.         [employer] has confirmed the use of plasma for this test. This has not been cleared   or approved by the U.S. Food and Drug Administration. The FDA has determined that such   clearance is not necessary.  Ethanol 06/10/2022 <10  <10 mg/dL Final    Ethanol percent 06/10/2022 <0.010  <0.010 % Final    Color, UA 06/10/2022 Yellow  Yellow Final    Turbidity UA 06/10/2022 Clear  Clear Final    Glucose, Ur 06/10/2022 NEGATIVE  NEGATIVE Final    Bilirubin Urine 06/10/2022 NEGATIVE  NEGATIVE Final    Ketones, Urine 06/10/2022 NEGATIVE  NEGATIVE Final    Specific [place], UA 06/10/2022 1.011  1.005 - 1.030 Final    Urine Hgb 06/10/2022 NEGATIVE  NEGATIVE Final    pH, UA 06/10/2022 6.0  5.0 - 8.0 Final    Protein, UA 06/10/2022 NEGATIVE  NEGATIVE Final    Urobilinogen, Urine 06/10/2022 Normal  Normal Final    Nitrite, Urine 06/10/2022 NEGATIVE  NEGATIVE Final    Leukocyte Esterase, Urine 06/10/2022 TRACE* NEGATIVE Final    - 06/10/2022        Final    WBC, UA 06/10/2022 5 TO 10  0 - 5 /HPF Final    RBC, UA 06/10/2022 2 TO 5  0 - 4 /HPF Final    Reference range defined for non-centrifuged specimen.  Casts UA 06/10/2022 2 TO 5 HYALINE Reference range defined for non-centrifuged specimen.   0 - 8 /LPF Final    Epithelial Cells UA 06/10/2022 5 TO 10  0 - 5 /HPF Final    Amphetamine Screen, Ur 06/10/2022 NEGATIVE  NEGATIVE Final    Comment:       (Positive cutoff 1000 ng/mL)                  Barbiturate Screen, Ur 06/10/2022 NEGATIVE  NEGATIVE Final    Comment:       (Positive cutoff 200 ng/mL)                  Benzodiazepine Screen, Urine 06/10/2022 POSITIVE* NEGATIVE Final    Comment:       (Positive cutoff 200 ng/mL)                  Cocaine Metabolite, Urine 06/10/2022 NEGATIVE  NEGATIVE Final    Comment:       (Positive cutoff 300 ng/mL)                  Methadone Screen, Urine 06/10/2022 NEGATIVE  NEGATIVE Final    Comment:       (Positive cutoff 300 ng/mL)                  Opiates, Urine 06/10/2022 NEGATIVE  NEGATIVE Final Comment:       (Positive cutoff 300 ng/mL)                  Phencyclidine, Urine 06/10/2022 NEGATIVE  NEGATIVE Final    Comment:       (Positive cutoff 25 ng/mL)                  Cannabinoid Scrn, Ur 06/10/2022 NEGATIVE  NEGATIVE Final    Comment:       (Positive cutoff 50 ng/mL)                  Oxycodone Screen, Ur 06/10/2022 NEGATIVE  NEGATIVE Final    Comment:       (Positive cutoff 100 ng/mL)                  Test Information 06/10/2022 Assay provides medical screening only. The absence of expected drug(s) and/or metabolite(s) may indicate diluted or adulterated urine, limitations of testing or timing of collection. Final    Comment: Testing for legal purposes should be confirmed by another method. To request confirmation   of test result, please call the lab within 7 days of sample submission. Reviewed patient's current plan of care and vital signs with nursing staff.     Labs reviewed: [x] Yes    Medications  Current Facility-Administered Medications: hydrOXYzine HCl (ATARAX) tablet 25 mg, 25 mg, Oral, TID PRN  chlorproMAZINE (THORAZINE) tablet 50 mg, 50 mg, Oral, Q6H PRN  chlorproMAZINE (THORAZINE) injection 50 mg, 50 mg, IntraMUSCular, Q6H PRN  albuterol sulfate HFA (PROVENTIL;VENTOLIN;PROAIR) 108 (90 Base) MCG/ACT inhaler 2 puff, 2 puff, Inhalation, Q6H PRN  atorvastatin (LIPITOR) tablet 20 mg, 20 mg, Oral, Nightly  benztropine (COGENTIN) tablet 0.5 mg, 0.5 mg, Oral, BID  furosemide (LASIX) tablet 20 mg, 20 mg, Oral, Daily  meloxicam (MOBIC) tablet 15 mg, 15 mg, Oral, Daily  risperiDONE (RISPERDAL) tablet 1 mg, 1 mg, Oral, BID  sertraline (ZOLOFT) tablet 100 mg, 100 mg, Oral, Daily  acetaminophen (TYLENOL) tablet 650 mg, 650 mg, Oral, Q6H PRN  ibuprofen (ADVIL;MOTRIN) tablet 400 mg, 400 mg, Oral, Q6H PRN  traZODone (DESYREL) tablet 50 mg, 50 mg, Oral, Nightly PRN  polyethylene glycol (GLYCOLAX) packet 17 g, 17 g, Oral, Daily PRN  aluminum & magnesium hydroxide-simethicone (MAALOX) 200-200-20 MG/5ML suspension 30 mL, 30 mL, Oral, Q6H PRN  nicotine polacrilex (NICORETTE) gum 2 mg, 2 mg, Oral, Q2H PRN    ASSESSMENT  Schizoaffective disorder, depressive type ([facility] [address]         HANDOFF  Patient symptoms:  Remains unstable  Medications as determined by attending physician  Encourage participation in groups and milieu. Probable discharge is to be determined by MD    Electronically signed by KANCHAN Santiago CNP on 6/12/2022 at 5:18 PM    **This report has been created using voice recognition software. It may contain minor errors which are inherent in voice recognition technology. **

## 2022-06-12 NOTE — BH NOTE
Patient is reporting increasing racing thoughts. Patient has an order for Atarax 50mg TID. Patient refuses to take Atarax 50mg, states it \"knocks me out and I dont want to be sleep. \" Ayala Shane NP notified, and decreased Atarax to 25 mg TID. Patient took medication without concern.

## 2022-06-12 NOTE — PLAN OF CARE
Problem: Self Harm/Suicidality  Goal: Will have no self-injury during hospital stay  Description: INTERVENTIONS:  1. Q 30 MINUTES: Routine safety checks  2. Q SHIFT & PRN: Assess risk to determine if routine checks are adequate to maintain patient safety  Note: Pt denies thoughts of self harm and is agreeable to seeking out should thoughts of self harm arise. Safe environment maintained. Q15 minute checks for safety cont per unit policy. Will cont to monitor for safety and provides support and reassurance as needed. PT flat and evasive during one to one time. PT only answers a few assessment questions and rolls in bed and covers up. Problem: Psychosis  Goal: Will report no hallucinations or delusions  Description: INTERVENTIONS:  1. Administer medication as  ordered  2. Assist with reality testing to support increasing orientation  3. Assess if patient's hallucinations or delusions are encouraging self harm or harm to others and intervene as appropriate  Note: PT admits to auditory hallucinations that are telling him to harm self.

## 2022-06-12 NOTE — PLAN OF CARE
[facility]  Day 3 Interdisciplinary Treatment Plan NOTE    Review Date & Time: 6/12/2022 1102    Admission Type:   Admission Type: Involuntary    Reason for admission:  Reason for Admission: increase in suicidal thoughts no specific plan increase in auditory hallucinations non compliance with medications  Estimated Length of Stay: 5-7 days  Estimated Discharge Date Update: to be determined by physician    PATIENT STRENGTHS:  Patient Strengths    Patient Strengths and Limitations:Limitations: Apathetic / Sharl Mom leisure interests,Multiple barriers to leisure interests,Inappropriate/potentially harmful leisure interests,Difficult relationships / poor social skills,Demonstrates discomfort with /lack of social skills,Difficulty problem solving/relies on others to help solve problems  Addictive Behavior:Addictive Behavior  In the Past 3 Months, Have You Felt or Has Someone Told You That You Have a Problem With  : None  Medical Problems:  Past Medical History:   Diagnosis Date    Cocaine abuse ([facility] [address]        Risk:  Fall Risk   Renato Scale Renato Scale Score: 22  BVC    Change in scores no Changes to plan of Care no    Status EXAM:   Mental Status and Behavioral Exam  Normal: No  Level of Assistance: Independent/Self  Facial Expression: Worried  Affect: Blunt  Level of Consciousness: Alert  Frequency of Checks: 4 times per hour, close  Mood:Normal: No  Mood: Depressed,Anxious  Motor Activity:Normal: No  Motor Activity: Decreased  Eye Contact: Fair  Observed Behavior: Cooperative,Guarded  Sexual Misconduct History: Past - no  Preception: [place] to person,[place] to time,[place] to place  Attention:Normal: No  Attention: Distractible  Thought Processes: Blocking  Thought Content:Normal: No  Thought Content: Poverty of content  Depression Symptoms: Isolative,Loss of interest  Anxiety Symptoms: Generalized  Shirley Symptoms: Labile  Hallucinations:  Auditory (comment),Command (comment)  Delusions: No  Memory:Normal: No  Memory: Confabulation  Insight and Judgment: No  Insight and Judgment: Poor judgment,Poor insight    Daily Assessment Last Entry:   Daily Sleep (WDL): Within Defined Limits            Daily Nutrition (WDL): Within Defined Limits  Level of Assistance: Independent/Self    Patient Monitoring:  Frequency of Checks: 4 times per hour, close    Psychiatric Symptoms:   Depression Symptoms  Depression Symptoms: Isolative,Loss of interest  Anxiety Symptoms  Anxiety Symptoms: Generalized  Shirley Symptoms  Shirley Symptoms: Labile          Suicide Risk CSSR-S:  1) Within the past month, have you wished you were dead or wished you could go to sleep and not wake up? : Yes  2) Have you actually had any thoughts of killing yourself? : No  3) Have you been thinking about how you might kill yourself? : No  5) Have you started to work out or worked out the details of how to kill yourself?  Do you intend to carry out this plan? : No  6) Have you ever done anything, started to do anything, or prepared to do anything to end your life?: Yes  Change in Result no Change in Plan of care no      EDUCATION:   EDUCATION:   Learner Progress Toward Treatment Goals: Reviewed results and recommendations of this team, Reviewed group plan and strategies, Reviewed signs, symptoms and risk of self harm and violent behavior, Reviewed goals and plan of care    Method:small group, individual verbal education    Outcome:verbalized by patient, but needs reinforcement to obtain goals    PATIENT GOALS:  Short term: med stabilization / get into rehab  Long term: permanent housing , sobriety    PLAN/TREATMENT RECOMMENDATIONS UPDATE: continue with group therapies, increased socialization, continue planning for after discharge goals, continue with medication compliance    SHORT-TERM GOALS UPDATE:   Time frame for Short-Term Goals: 5-7 days    LONG-TERM GOALS UPDATE:   Time frame for Long-Term Goals: 6 months  Members Present in Team Meeting: See Signature Sheet    TREVIN Lowe

## 2022-06-13 PROCEDURE — 6370000000 HC RX 637 (ALT 250 FOR IP)

## 2022-06-13 PROCEDURE — APPSS30 APP SPLIT SHARED TIME 16-30 MINUTES

## 2022-06-13 PROCEDURE — 97161 PT EVAL LOW COMPLEX 20 MIN: CPT

## 2022-06-13 PROCEDURE — 6370000000 HC RX 637 (ALT 250 FOR IP): Performed by: PSYCHIATRY & NEUROLOGY

## 2022-06-13 PROCEDURE — 97116 GAIT TRAINING THERAPY: CPT

## 2022-06-13 PROCEDURE — 99232 SBSQ HOSP IP/OBS MODERATE 35: CPT | Performed by: PSYCHIATRY & NEUROLOGY

## 2022-06-13 PROCEDURE — 1240000000 HC EMOTIONAL WELLNESS R&B

## 2022-06-13 RX ADMIN — BENZTROPINE MESYLATE 0.5 MG: 0.5 TABLET ORAL at 22:09

## 2022-06-13 RX ADMIN — ATORVASTATIN CALCIUM 20 MG: 20 TABLET, FILM COATED ORAL at 22:09

## 2022-06-13 RX ADMIN — RISPERIDONE 1 MG: 1 TABLET ORAL at 22:09

## 2022-06-13 RX ADMIN — MELOXICAM 15 MG: 7.5 TABLET ORAL at 08:20

## 2022-06-13 RX ADMIN — BENZTROPINE MESYLATE 0.5 MG: 0.5 TABLET ORAL at 08:20

## 2022-06-13 RX ADMIN — SERTRALINE HYDROCHLORIDE 100 MG: 100 TABLET ORAL at 08:20

## 2022-06-13 RX ADMIN — HYDROXYZINE HYDROCHLORIDE 25 MG: 25 TABLET, FILM COATED ORAL at 10:04

## 2022-06-13 RX ADMIN — FUROSEMIDE 20 MG: 20 TABLET ORAL at 08:20

## 2022-06-13 RX ADMIN — RISPERIDONE 1 MG: 1 TABLET ORAL at 08:20

## 2022-06-13 ASSESSMENT — PAIN SCALES - GENERAL: PAINLEVEL_OUTOF10: 8

## 2022-06-13 NOTE — PROGRESS NOTES
Physical Therapy  Facility/Department: [facility]  Physical Therapy Initial Assessment    Name: Daryl Andrews  : 1970  MRN: [id number]  Date of Service: 2022    Discharge Recommendations:  Continue to assess pending progress          Patient Diagnosis(es): There were no encounter diagnoses. Past Medical History:  has a past medical history of Cocaine abuse ([facility] [address] Past Surgical History:  has a past surgical history that includes Salpingo-oophorectomy and Dilation and curettage of uterus. Assessment   Body Structures, Functions, Activity Limitations Requiring Skilled Therapeutic Intervention: Decreased functional mobility ; Decreased safe awareness;Decreased strength;Decreased body mechanics; Decreased posture  Assessment: Pt demo's good tolerance of session. Pt demo's balance and gait deficits causing pt to be a fall risk. Cont per POC to prepare for d/c. Treatment Diagnosis: impaired mobility d/t weakness  Specific Instructions for Next Treatment: gait training, LE strengthening, balance training  Therapy Prognosis: Good  Decision Making: Low Complexity  History: Daryl Andrews is a 45 y.o. female who presents with suicidal ideations, auditory hallucinations. Patient with laceration and abrasion to left forearm. Laceration was caused by a back scratcher. Patient stating that she does not know if this was a suicide attempt or not but the voices told her to cut her arm on the back scratcher. Patient also reporting that she has auditory hallucinations of voices telling her to kill her self and to not take her medications. Patient states that the voices are coming through a song which she is listening to on repeat on her phone. Denies any homicidal ideations or visual hallucinations. Denies any other complaints at this time.   Exam: social hx, ROM, MMT< sensation, balance, mobility  Clinical Presentation: pleasant, cooperative  Barriers to Learning: schizophrenia  Requires PT Follow-Up: Yes  Activity Tolerance  Activity Tolerance: Patient tolerated evaluation without incident     Plan   Plan  Plan: 2-3 times per week  Specific Instructions for Next Treatment: gait training, LE strengthening, balance training  Current Treatment Recommendations: Balance training,Functional mobility training,Transfer training,Gait training,Endurance training,Safety education & training,Equipment evaluation, education, & procurement,Therapeutic activities,Home exercise program,Patient/Caregiver education & training,Strengthening  Safety Devices  Type of Devices: Left in chair,Nurse notified (sitting in common area)  Restraints  Restraints Initially in Place: No     Restrictions  Restrictions/Precautions  Restrictions/Precautions: Fall Risk,Up as Tolerated  Required Braces or Orthoses?: No  Position Activity Restriction  Other position/activity restrictions: elopement precautions- on locked unit     Subjective   Pain: 8/10 generalized pain  General  Chart Reviewed: Yes  Patient assessed for rehabilitation services?: Yes  Response To Previous Treatment: Not applicable  Family / Caregiver Present: No  Diagnosis: acute psychosis  Follows Commands: Within Functional Limits  General Comment  Comments: RN ok's session, states pt has been wanting hospital bed for comfort rather than standard bed. Pt prefers to be called \"Edson\"  Subjective  Subjective: Pt is observed sitting in common area of [facility] unit. Pt is agreeable to PT.  C/o chronic R hip and knee pain as well as LBP         Social/Functional History  Social/Functional History  Lives With: Other (comment) (pt is homeless at this time)  Type of Home: Homeless (plans to d/c to shelter)  Has the patient had two or more falls in the past year or any fall with injury in the past year?: Yes  ADL Assistance: Independent  Ambulation Assistance: Independent (uses st cane on R side)  Transfer Assistance: Independent  Active : No  Additional Comments: Pt plans to d/c to shelter although unknown location at this time. Pt reports h/o falls d/t R knee giving out  Vision/Hearing  Vision  Vision: Within Functional Limits  Hearing  Hearing: Within functional limits    Cognition   Orientation  Overall Orientation Status: Within Functional Limits  Cognition  Overall Cognitive Status: WFL     Objective      Observation/Palpation  Posture: Fair (partially trunk flex with lean to R side onto cane. [place] is noted to bed too short; pt is agreeable to allow PT to adjust cane to appropriate height)  Observation: areas of linear scars on UEs and LEs, laceration on L wrist, scar on medial aspect of R elbow d/t prior surgery  Edema: none noted  Gross Assessment  AROM: Within functional limits  Strength: Generally decreased, functional (R UE grossly 4/5 except elbow ext 3+/5, R LE grossly 4-/5 to 4/5 and L LE grossly 4+/5)  Tone: Normal  Sensation: Intact (generalized decreased sensation R LE)                 Balance  Sitting: Intact  Standing: Impaired  Standing - Static: Fair (good with st cane)  Standing - Dynamic: Fair (good with st cane)  Bed mobility  Supine to Sit: Independent  Sit to Supine: Independent  Bed Mobility Comments: flat bed  Transfers  Sit to Stand: Independent  Stand to sit: Independent  Comment: with and without st cane  Ambulation  Surface: level tile  Device: No Device  Assistance: Stand by assistance  Quality of Gait: antalgic gait R LE, partially flexed posture  Distance: 100 ft, 150 ft  More Ambulation?: Yes  Ambulation 2  Surface - 2: level tile  Device 2: Single point cane  Assistance 2: Stand by assistance  Quality of Gait 2: heavy reliance onto cane on R side, places cane partially in front of R LE rather than to side. Cane height elevated with partial improvement of gait deficits  Distance: 150 ft  Comments: Pt utilizes cane on R side despite having R LE pain/weakness.  Pt verbalizes she is aware that cane should be on opposite side but states \"I'm very left hand dominant and need to use

## 2022-06-13 NOTE — PLAN OF CARE
Problem: Self Harm/Suicidality  Goal: Will have no self-injury during hospital stay  Description: INTERVENTIONS:  1. Q 30 MINUTES: Routine safety checks  2. Q SHIFT & PRN: Assess risk to determine if routine checks are adequate to maintain patient safety  6/12/2022 2044 by Elena Ng RN  Note: Pt denies thoughts of self harm and is agreeable to seeking out should thoughts of self harm arise. Safe environment maintained. Q15 minute checks for safety cont per unit policy. Will cont to monitor for safety and provides support and reassurance as needed. PT rates depression a 7 out of 10 and anxiety a 4 out of 10 this evening. PT is hopeful for group home placement upon discharge. PT more social with peers and staff this evening pt attended group for a short period of time tonight. Problem: Psychosis  Goal: Will report no hallucinations or delusions  Description: INTERVENTIONS:  1. Administer medication as  ordered  2. Assist with reality testing to support increasing orientation  3. Assess if patient's hallucinations or delusions are encouraging self harm or harm to others and intervene as appropriate  6/12/2022 2044 by Elena Ng RN  Note: PT admits to auditory hallucinations that are telling him to harm self pt states they are becoming more managable. PT is taking ordered medications.

## 2022-06-13 NOTE — PLAN OF CARE
Problem: Self Harm/Suicidality  Goal: Will have no self-injury during hospital stay  Description: INTERVENTIONS:  1. Q 30 MINUTES: Routine safety checks  2. Q SHIFT & PRN: Assess risk to determine if routine checks are adequate to maintain patient safety  Outcome: Progressing     Problem: Psychosis  Goal: Will report no hallucinations or delusions  Description: INTERVENTIONS:  1. Administer medication as  ordered  2. Assist with reality testing to support increasing orientation  3. Assess if patient's hallucinations or delusions are encouraging self harm or harm to others and intervene as appropriate  Outcome: Progressing   Pt is free from self harm and agrees to feeling safe on the unit. Pt is anxious at times. Pt encouraged to explore coping skills for reducing anxiety. None verbalized at this time. Pt medicated with PO PRN medication Atarax 50 mg tablet per MAR. Pt denies auditory/visual hallucinations. Pt. Remains on q15 min checks and frequent spontaneous checks throughout shift. Pt. Safety maintained.

## 2022-06-13 NOTE — GROUP NOTE
Group Therapy Note    Date: 6/13/2022    Group Start Time: 1000  Group End Time: 4999  Group Topic: Psychotherapy    [facility] T[facility] [place], PETROSW        Group Therapy Note    Attendees: 6/14         Patient's Goal:  Increase interpersonal relationship skills    Notes:  Patient was an active participant in group discussion    Status After Intervention:  Improved    Participation Level:  Active Listener and Interactive    Participation Quality: Appropriate, Attentive and Sharing      Speech:  normal      Thought Process/Content: Logical  Linear      Affective Functioning: Congruent      Mood: euthymic      Level of consciousness:  Alert, Oriented x4 and Attentive      Response to Learning: Able to verbalize current knowledge/experience, Able to verbalize/acknowledge new learning and Able to retain information      Endings: None Reported    Modes of Intervention: Support, Socialization and Exploration      Discipline Responsible: /Counselor      Signature:  TING Patel, BHAVIK

## 2022-06-13 NOTE — GROUP NOTE
Group Therapy Note    Date: 6/13/2022    Group Start Time: 1100  Group End Time: 7187  Group Topic: Recreational    MATT BRIONES    Kaylee Chan        Group Therapy Note    Attendees: 6/13         Patient's Goal:  Only one patient initially joined, and had requested markers to finish a drawing form the previous day. Goals to engage in leisure opportunities, increase socialization, and normalize the environment    Notes:  Patient was first attendee and had requested markers. Was pleasant and engaging throughout having positive interactions with peers and staff    Status After Intervention:  Improved    Participation Level:  Active Listener and Interactive    Participation Quality: Appropriate, Attentive and Sharing      Speech:  normal      Thought Process/Content: Logical  Linear      Affective Functioning: Congruent      Mood: euthymic      Level of consciousness:  Alert and Attentive      Response to Learning: Able to verbalize current knowledge/experience and Progressing to goal      Endings: None Reported    Modes of Intervention: Socialization, Activity and Reality-testing      Discipline Responsible: Psychoeducational Specialist      Signature:  Kaylee Chan

## 2022-06-13 NOTE — PROGRESS NOTES
Daily Progress Note  6/13/2022    Patient Name: Angela Lynch: Suicidal ideation and acute psychosis         SUBJECTIVE:      Patient is seen today for a follow up assessment. Patient has been compliant with scheduled medication at this time. Patient has not required emergency medications in the past 24 hours. When approached the patient presents as pleasant and cooperative. Patient states he is experiencing continued improvement in depression and suicidal ideation. Patient currently reports improvement in auditory hallucinations and is denying visual hallucinations. Patient endorses paranoia however is unable to describe why. Patient is unable to communicate coping skills to maintain safety outside the hospital and states \"I tried it all, I am wanting nothing to do in here, I want to leave\". Patient is denying medication side effects and states they have been helpful in allowing her voices. Appetite:  [x] Adequate/Unchanged  [] Increased  [] Decreased      Sleep:       [x] Adequate/Unchanged  [] Fair  [] Poor      Group Attendance on Unit:   [x] Yes   [] Selectively    [] No    Medication Side Effects: Denies         Mental Status Exam  Level of consciousness: Alert and awake. Appearance: Appropriate attire for setting, seated in chair, with fair  grooming and hygiene. Behavior/Motor: Approachable, compliant with interview  Attitude toward examiner: Cooperative, attentive, good eye contact. Speech: normal rate and normal volume   Mood:  Patient reports \" good\". Affect: Reactive  Thought processes: Linear and coherent. Thought content: Denies homicidal ideation. Suicidal Ideation: Reports improvement in suicidal ideations, without current intent to harm self on unit. Unable to contract for safety off unit. Delusions: No evidence of delusions. Endorses paranoia. Perceptual Disturbance: Patient does not appear to be responding to internal stimuli.  Reports improvement in auditory hallucinations. Denies visual hallucinations. Cognition: Oriented to self, location, time, and situation. Memory: Intact. Insight & Judgement: Poor. Data   height is 5' 6\" (1.676 m) and weight is 245 lb (111.1 kg). His oral temperature is 98.1 °F (36.7 °C). His blood pressure is 126/89 and his pulse is 65. His respiration is 14. Labs:   No visits with results within 2 Day(s) from this visit.    Latest known visit with results is:   Admission on 06/10/2022, Discharged on 06/10/2022   Component Date Value Ref Range Status    WBC 06/10/2022 16.2* 3.5 - 11.3 k/uL Final    RBC 06/10/2022 4.94  3.95 - 5.11 m/uL Final    Hemoglobin 06/10/2022 15.1  11.9 - 15.1 g/dL Final    Hematocrit 06/10/2022 45.2  36.3 - 47.1 % Final    MCV 06/10/2022 91.5  82.6 - 102.9 fL Final    MCH 06/10/2022 30.6  25.2 - 33.5 pg Final    MCHC 06/10/2022 33.4  28.4 - 34.8 g/dL Final    RDW 06/10/2022 13.8  11.8 - 14.4 % Final    Platelets 94/57/3715 277  138 - 453 k/uL Final    MPV 06/10/2022 10.1  8.1 - 13.5 fL Final    NRBC Automated 06/10/2022 0.0  0.0 per 100 WBC Final    Seg Neutrophils 06/10/2022 82* 36 - 65 % Final    Lymphocytes 06/10/2022 12* 24 - 43 % Final    Monocytes 06/10/2022 6  3 - 12 % Final    Eosinophils % 06/10/2022 0* 1 - 4 % Final    Basophils 06/10/2022 0  0 - 2 % Final    Immature Granulocytes 06/10/2022 0  0 % Final    Segs Absolute 06/10/2022 13.14* 1.50 - 8.10 k/uL Final    Absolute Lymph # 06/10/2022 1.96  1.10 - 3.70 k/uL Final    Absolute Mono # 06/10/2022 0.96  0.10 - 1.20 k/uL Final    Absolute Eos # 06/10/2022 0.03  0.00 - 0.44 k/uL Final    Basophils Absolute 06/10/2022 0.04  0.00 - 0.20 k/uL Final    Absolute Immature Granulocyte 06/10/2022 0.07  0.00 - 0.30 k/uL Final    Glucose 06/10/2022 101* 70 - 99 mg/dL Final    BUN 06/10/2022 7  6 - 20 mg/dL Final    CREATININE 06/10/2022 0.76  0.50 - 0.90 mg/dL Final    Calcium 06/10/2022 9.2  8.6 - 10.4 mg/dL Final    Sodium 06/10/2022 138  135 - 144 mmol/L Final    Potassium 06/10/2022 3.6* 3.7 - 5.3 mmol/L Final    Chloride 06/10/2022 103  98 - 107 mmol/L Final    CO2 06/10/2022 23  20 - 31 mmol/L Final    Anion Gap 06/10/2022 12  9 - 17 mmol/L Final    Alkaline Phosphatase 06/10/2022 117* 35 - 104 U/L Final    ALT 06/10/2022 13  5 - 33 U/L Final    AST 06/10/2022 15  <32 U/L Final    Total Bilirubin 06/10/2022 0.33  0.3 - 1.2 mg/dL Final    Total Protein 06/10/2022 6.9  6.4 - 8.3 g/dL Final    Albumin 06/10/2022 4.2  3.5 - 5.2 g/dL Final    Albumin/Globulin Ratio 06/10/2022 1.6  1.0 - 2.5 Final    GFR Non- 06/10/2022 >60  >60 mL/min Final    GFR  06/10/2022 >60  >60 mL/min Final    GFR Comment 06/10/2022        Final    Comment: Average GFR for 52-63 years old:   80 mL/min/1.73sq m  Chronic Kidney Disease:   <60 mL/min/1.73sq m  Kidney failure:   <15 mL/min/1.73sq m              eGFR calculated using average adult body mass. Additional eGFR calculator available at:        [employer]            hCG Qual 06/10/2022 NEGATIVE  NEGATIVE Final    Comment: Specimens with hCG levels near the threshold of the test (25 mIU/mL) may give a negative or   indeterminate result. In such cases, another test should be performed with a new specimen   in 48-72 hours. If early pregnancy is suspected clinically in this setting, correlation   with quantitative serum b-hCG level is suggested. [facility] has confirmed the use of plasma for this test. This has not been cleared   or approved by the U.S. Food and Drug Administration. The FDA has determined that such   clearance is not necessary.       Ethanol 06/10/2022 <10  <10 mg/dL Final    Ethanol percent 06/10/2022 <0.010  <0.010 % Final    Color, UA 06/10/2022 Yellow  Yellow Final    Turbidity UA 06/10/2022 Clear  Clear Final    Glucose, Ur 06/10/2022 NEGATIVE  NEGATIVE Final    Bilirubin Urine 06/10/2022 NEGATIVE  NEGATIVE Final    Ketones, Urine 06/10/2022 NEGATIVE  NEGATIVE Final    Specific [place], UA 06/10/2022 1.011  1.005 - 1.030 Final    Urine Hgb 06/10/2022 NEGATIVE  NEGATIVE Final    pH, UA 06/10/2022 6.0  5.0 - 8.0 Final    Protein, UA 06/10/2022 NEGATIVE  NEGATIVE Final    Urobilinogen, Urine 06/10/2022 Normal  Normal Final    Nitrite, Urine 06/10/2022 NEGATIVE  NEGATIVE Final    Leukocyte Esterase, Urine 06/10/2022 TRACE* NEGATIVE Final    - 06/10/2022        Final    WBC, UA 06/10/2022 5 TO 10  0 - 5 /HPF Final    RBC, UA 06/10/2022 2 TO 5  0 - 4 /HPF Final    Reference range defined for non-centrifuged specimen.  Casts UA 06/10/2022 2 TO 5 HYALINE Reference range defined for non-centrifuged specimen.   0 - 8 /LPF Final    Epithelial Cells UA 06/10/2022 5 TO 10  0 - 5 /HPF Final    Amphetamine Screen, Ur 06/10/2022 NEGATIVE  NEGATIVE Final    Comment:       (Positive cutoff 1000 ng/mL)                  Barbiturate Screen, Ur 06/10/2022 NEGATIVE  NEGATIVE Final    Comment:       (Positive cutoff 200 ng/mL)                  Benzodiazepine Screen, Urine 06/10/2022 POSITIVE* NEGATIVE Final    Comment:       (Positive cutoff 200 ng/mL)                  Cocaine Metabolite, Urine 06/10/2022 NEGATIVE  NEGATIVE Final    Comment:       (Positive cutoff 300 ng/mL)                  Methadone Screen, Urine 06/10/2022 NEGATIVE  NEGATIVE Final    Comment:       (Positive cutoff 300 ng/mL)                  Opiates, Urine 06/10/2022 NEGATIVE  NEGATIVE Final    Comment:       (Positive cutoff 300 ng/mL)                  Phencyclidine, Urine 06/10/2022 NEGATIVE  NEGATIVE Final    Comment:       (Positive cutoff 25 ng/mL)                  Cannabinoid Scrn, Ur 06/10/2022 NEGATIVE  NEGATIVE Final    Comment:       (Positive cutoff 50 ng/mL)                  Oxycodone Screen, Ur 06/10/2022 NEGATIVE  NEGATIVE Final    Comment:       (Positive cutoff 100 ng/mL)                  Test Information 06/10/2022 Assay provides medical screening only. The absence of expected drug(s) and/or metabolite(s) may indicate diluted or adulterated urine, limitations of testing or timing of collection. Final    Comment: Testing for legal purposes should be confirmed by another method. To request confirmation   of test result, please call the lab within 7 days of sample submission. Reviewed patient's current plan of care and vital signs with nursing staff. Labs reviewed: [x] Yes    Medications  Current Facility-Administered Medications: hydrOXYzine HCl (ATARAX) tablet 25 mg, 25 mg, Oral, TID PRN  chlorproMAZINE (THORAZINE) tablet 50 mg, 50 mg, Oral, Q6H PRN  chlorproMAZINE (THORAZINE) injection 50 mg, 50 mg, IntraMUSCular, Q6H PRN  albuterol sulfate HFA (PROVENTIL;VENTOLIN;PROAIR) 108 (90 Base) MCG/ACT inhaler 2 puff, 2 puff, Inhalation, Q6H PRN  atorvastatin (LIPITOR) tablet 20 mg, 20 mg, Oral, Nightly  benztropine (COGENTIN) tablet 0.5 mg, 0.5 mg, Oral, BID  furosemide (LASIX) tablet 20 mg, 20 mg, Oral, Daily  meloxicam (MOBIC) tablet 15 mg, 15 mg, Oral, Daily  risperiDONE (RISPERDAL) tablet 1 mg, 1 mg, Oral, BID  sertraline (ZOLOFT) tablet 100 mg, 100 mg, Oral, Daily  acetaminophen (TYLENOL) tablet 650 mg, 650 mg, Oral, Q6H PRN  ibuprofen (ADVIL;MOTRIN) tablet 400 mg, 400 mg, Oral, Q6H PRN  traZODone (DESYREL) tablet 50 mg, 50 mg, Oral, Nightly PRN  polyethylene glycol (GLYCOLAX) packet 17 g, 17 g, Oral, Daily PRN  aluminum & magnesium hydroxide-simethicone (MAALOX) 200-200-20 MG/5ML suspension 30 mL, 30 mL, Oral, Q6H PRN  nicotine polacrilex (NICORETTE) gum 2 mg, 2 mg, Oral, Q2H PRN    ASSESSMENT  Schizoaffective disorder, depressive type (HCC)         HANDOFF  Patient symptoms: Showing modest improvement  Medications as determined by attending physician  Encourage participation in groups and milieu.   Probable discharge is to be determined by MD    Electronically signed by Dashawn Gil APRN - CNP on 6/13/2022 at 4:25 PM    **This report has been created using voice recognition software. It may contain minor errors which are inherent in voice recognition technology. **  I independently saw and evaluated the patient. I reviewed the  documentation above. Any additional comments or changes to the   documentation are stated below otherwise agree with assessment. The patient was seen face-to-face. The patient reports improvement in his mood and is discharged focused. The patient is rosanna for safety and is willing to follow-up with outpatient care. The patient has some paranoia as noted above. The patient wants to continue with treatment for her addiction and denies any cravings at this time. PLAN  Medications as noted above  Attempt to develop insight  Psycho-education conducted. Estimated Length of Stay is 1 days  Supportive Therapy conducted.   Follow-up daily while on inpatient unit    Electronically signed by Nathaniel Farris MD on 6/13/22 at 6:59 PM EDT

## 2022-06-14 VITALS
WEIGHT: 245 LBS | BODY MASS INDEX: 39.37 KG/M2 | HEART RATE: 74 BPM | RESPIRATION RATE: 14 BRPM | TEMPERATURE: 98.1 F | DIASTOLIC BLOOD PRESSURE: 50 MMHG | SYSTOLIC BLOOD PRESSURE: 104 MMHG | HEIGHT: 66 IN

## 2022-06-14 PROCEDURE — 6370000000 HC RX 637 (ALT 250 FOR IP): Performed by: PSYCHIATRY & NEUROLOGY

## 2022-06-14 PROCEDURE — 99239 HOSP IP/OBS DSCHRG MGMT >30: CPT | Performed by: PSYCHIATRY & NEUROLOGY

## 2022-06-14 RX ORDER — ATORVASTATIN CALCIUM 20 MG/1
20 TABLET, FILM COATED ORAL NIGHTLY
Qty: 30 TABLET | Refills: 0 | Status: SHIPPED | OUTPATIENT
Start: 2022-06-14 | End: 2022-09-20

## 2022-06-14 RX ORDER — RISPERIDONE 1 MG/1
1 TABLET, FILM COATED ORAL 2 TIMES DAILY
Qty: 60 TABLET | Refills: 0 | Status: SHIPPED | OUTPATIENT
Start: 2022-06-14 | End: 2022-09-20

## 2022-06-14 RX ORDER — FUROSEMIDE 20 MG/1
20 TABLET ORAL DAILY
Qty: 60 TABLET | Refills: 0 | Status: SHIPPED | OUTPATIENT
Start: 2022-06-14 | End: 2022-09-20

## 2022-06-14 RX ORDER — SERTRALINE HYDROCHLORIDE 100 MG/1
100 TABLET, FILM COATED ORAL DAILY
Qty: 30 TABLET | Refills: 3 | Status: SHIPPED | OUTPATIENT
Start: 2022-06-15 | End: 2022-09-15 | Stop reason: ALTCHOICE

## 2022-06-14 RX ORDER — BENZTROPINE MESYLATE 0.5 MG/1
0.5 TABLET ORAL 2 TIMES DAILY
Qty: 60 TABLET | Refills: 3 | Status: SHIPPED | OUTPATIENT
Start: 2022-06-14

## 2022-06-14 RX ADMIN — SERTRALINE HYDROCHLORIDE 100 MG: 100 TABLET ORAL at 08:45

## 2022-06-14 RX ADMIN — BENZTROPINE MESYLATE 0.5 MG: 0.5 TABLET ORAL at 08:45

## 2022-06-14 RX ADMIN — MELOXICAM 15 MG: 7.5 TABLET ORAL at 08:45

## 2022-06-14 RX ADMIN — RISPERIDONE 1 MG: 1 TABLET ORAL at 08:45

## 2022-06-14 RX ADMIN — FUROSEMIDE 20 MG: 20 TABLET ORAL at 08:45

## 2022-06-14 NOTE — BH NOTE
[facility]  Discharge Note    Pt discharged with followings belongings:   Dental Appliances: None  Vision - Corrective Lenses: None  Hearing Aid: None  Jewelry: Necklace,Bracelet  Body Piercings Removed: N/A  Clothing: Footwear,Shorts,Shirt,Socks,Undergarments  Other Valuables: Suzanne Wilkerson sent home with Patient or returned to patient. Patient education on aftercare instructions: YES  Information faxed to Mark Davis by RN  at 10:44 AM .Patient verbalize understanding of AVS:  YES. Status EXAM upon discharge:Baseline  Mental Status and Behavioral Exam  Normal: No  Level of Assistance: Independent/Self  Facial Expression: Hostile,Worried  Affect: Appropriate  Level of Consciousness: Alert  Frequency of Checks: 4 times per hour, close  Mood:Normal: No  Mood: Depressed,Anxious  Motor Activity:Normal: Yes  Motor Activity: Decreased  Eye Contact: Fair  Observed Behavior: Cooperative,Guarded  Sexual Misconduct History: Current - no  Preception: [place] to person,[place] to time,[place] to place,[place] to situation  Attention:Normal: Yes  Attention: Distractible  Thought Processes: Circumstantial  Thought Content:Normal: No  Thought Content: Poverty of content  Depression Symptoms: Loss of interest  Anxiety Symptoms: Generalized  Shirley Symptoms: Labile  Hallucinations: None  Delusions: No  Memory:Normal: No  Memory: Poor recent,Poor remote  Insight and Judgment: No  Insight and Judgment: Poor judgment,Poor insight      Metabolic Screening:    No results found for: LABA1C    No results found for: CHOL  No results found for: TRIG  No results found for: HDL  No components found for: LDLCAL  No results found for: LABVLDL  Patient ambulated to the [facility] entrance with all belongings, AVS, and medication filled by [facility]: other medications can not be filled at this time; Patient to be transported to the [employer] living quarters via insurance cab.  Patient to follow up with [facility] for mental health.   Sofia Urbina RN

## 2022-06-14 NOTE — CARE COORDINATION
DISCHARGE PLANNING:  - SW advised Pt wants [facility] and nurse Eddie Sinha sent paperwork for possible admission.  - SW advised Pt also wants paperwork sent to [facility]. Paperwork sent on this date/time.

## 2022-06-14 NOTE — GROUP NOTE
Group Therapy Note    Date: 6/14/2022    Group Start Time: 0900  Group End Time: 0920  Group Topic: Group Documentation    MATT Iqbal        Group Therapy Note    Attendees: 7/15         Patient's Goal:  Speak with the , work on d/c planning  Notes:  Goal setting/support group    Status After Intervention:  Improved    Participation Level:  Active Listener and Interactive    Participation Quality: Sharing and Supportive      Speech:  normal      Thought Process/Content: Logical      Affective Functioning: Congruent      Mood: anxious      Level of consciousness:  Alert, Oriented x4 and Attentive      Response to Learning: Able to verbalize current knowledge/experience, Able to verbalize/acknowledge new learning and Able to retain information      Endings: None Reported    Modes of Intervention: Education, Support, Socialization and Problem-solving      Discipline Responsible: [facility] [address] [facility] "[employer]      Signature:  Ceci Iqbal
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
## 2022-06-14 NOTE — PROGRESS NOTES
CLINICAL PHARMACY NOTE: MEDS TO BEDS    Total # of Prescriptions Filled: 1   The following medications were delivered to the patient:  · Albuterol Sulfate     Additional Documentation:  Delivered Medication to [address]     Refill(s) Too Soon + Profiled Rx(s)   - Zoloft: 6/17   - Risperdal: 6/30   - Cogentin: 6/30   - Lasix: 6/30   - Lipitor: 6/30

## 2022-06-14 NOTE — PLAN OF CARE
Problem: Self Harm/Suicidality  Goal: Will have no self-injury during hospital stay  Description: INTERVENTIONS:  1. Q 30 MINUTES: Routine safety checks  2. Q SHIFT & PRN: Assess risk to determine if routine checks are adequate to maintain patient safety  6/13/2022 2158 by [facility]  Outcome: Progressing   Remains free of self harm. Problem: Psychosis  Goal: Will report no hallucinations or delusions  Description: INTERVENTIONS:  1. Administer medication as  ordered  2. Assist with reality testing to support increasing orientation  3. Assess if patient's hallucinations or delusions are encouraging self harm or harm to others and intervene as appropriate  6/13/2022 2158 by [facility]  Outcome: Progressing   Coop. With vitals taken. Pleasant on approach. Coop. Et controlled. Affect flat. Depressed. Irritable at times. Spends time in milieu. Sociable with staff et select peers. Looking forward to being D/C. Not able to leave yet. My Dr. Louie Gitelman I might relapse. [facility] has a good support system. My Dad is really hoping that I am jules to stay clean. Need to stay clean. Need to spend money on myself. Looking forward to follow up care after being D/C . Do some things for me. Nourishment taken well. Attends all unit groups et unit activities. Accepting of medications provided.

## 2022-06-14 NOTE — GROUP NOTE
Group Therapy Note    Date: 6/14/2022    Group Start Time: 1000  Group End Time: 1025  Group Topic: Psychotherapy    TING Willard, LSW        Group Therapy Note    Attendees: 8/15         Patient's Goal:  Increase interpersonal relationship skills    Notes:  Patient was an active participant in group discussion    Status After Intervention:  Unchanged    Participation Level:  Active Listener and Interactive    Participation Quality: Appropriate, Attentive and Sharing      Speech:  normal      Thought Process/Content: Logical  Linear      Affective Functioning: Congruent      Mood: euthymic      Level of consciousness:  Alert, Oriented x4 and Attentive      Response to Learning: Able to verbalize current knowledge/experience and Able to verbalize/acknowledge new learning      Endings: None Reported    Modes of Intervention: Support, Socialization and Exploration      Discipline Responsible: /Counselor      Signature:  TING Rubio, BHAVIK

## 2022-06-14 NOTE — CARE COORDINATION
DISCHARGE UPDATE:  - Writer meets 1:1 with Pt to discuss safe plan at discharge. He states he wants to get discharged today and to go to a women's shelter. Pt reports has never went to a shelter here in [place] so no need to contact [facility] regarding coming to their shelter. - Pt has phone numbers to follow-through when discharged at [facility] and [facility]. Pt also have the AOD folder which includes other options. He is clear and focused regarding discharge today and to a shelter. He states he has things he needs to do to help his father who is in a nursing home. - Writer also provides information on 2-1-1 and other community resources.

## 2022-06-14 NOTE — BH NOTE
Patient given tobacco quitline number 97455765229 at this time, refusing to call at this time, states \" I just dont want to quit now\"- patient given information as to the dangers of long term tobacco use. Continue to reinforce the importance of tobacco cessation.

## 2022-06-14 NOTE — PLAN OF CARE
Problem: Self Harm/Suicidality  Goal: Will have no self-injury during hospital stay  Description: INTERVENTIONS:  1. Q 30 MINUTES: Routine safety checks  2. Q SHIFT & PRN: Assess risk to determine if routine checks are adequate to maintain patient safety  6/14/2022 0927 by Neli Kay RN  Outcome: Completed     Problem: Psychosis  Goal: Will report no hallucinations or delusions  Description: INTERVENTIONS:  1. Administer medication as  ordered  2. Assist with reality testing to support increasing orientation  3. Assess if patient's hallucinations or delusions are encouraging self harm or harm to others and intervene as appropriate  6/14/2022 0927 by Neli Kay RN  Outcome: Completed     Problem: Safety - Violent/Self-destructive Restraint  Goal: Remains free of injury from restraints (Restraint for Violent/Self-Destructive Behavior)  Description: INTERVENTIONS:  1. Determine that de-escalation and other, less restrictive measures have been tried or would not be effective before applying the restraint  2. Identify and document the criteria for restraint  3. Evaluate the patient's condition at the time of restraint application  4. Inform patient/family regarding the reason for restraint/seclusion  5. Q2H: Monitor comfort, nutrition and hydration needs  6. Q15M: Perform safety checks including skin, circulation, sensory, respiratory and psychological status  7. Ensure continuous observation  8.  Identify and implement measures to help patient regain control, assess readiness for release and initiate progressive release per policy  Outcome: Completed     Problem: Pain  Goal: Verbalizes/displays adequate comfort level or baseline comfort level  Outcome: Completed

## 2022-06-14 NOTE — DISCHARGE INSTR - DIET

## 2022-06-16 ENCOUNTER — HOSPITAL ENCOUNTER (EMERGENCY)
Age: 52
Discharge: PSYCHIATRIC OR SUBSTANCE USE FACILITY | End: 2022-06-17
Attending: EMERGENCY MEDICINE
Payer: MEDICARE

## 2022-06-16 VITALS
RESPIRATION RATE: 18 BRPM | SYSTOLIC BLOOD PRESSURE: 122 MMHG | TEMPERATURE: 97.6 F | DIASTOLIC BLOOD PRESSURE: 77 MMHG | OXYGEN SATURATION: 95 % | HEART RATE: 61 BPM

## 2022-06-16 DIAGNOSIS — T14.91XA SUICIDAL BEHAVIOR WITH ATTEMPTED SELF-INJURY (HCC): ICD-10-CM

## 2022-06-16 DIAGNOSIS — R44.3 HALLUCINATIONS: Primary | ICD-10-CM

## 2022-06-16 LAB
ABSOLUTE EOS #: 0.13 K/UL (ref 0–0.44)
ABSOLUTE IMMATURE GRANULOCYTE: 0.04 K/UL (ref 0–0.3)
ABSOLUTE LYMPH #: 1.58 K/UL (ref 1.1–3.7)
ABSOLUTE MONO #: 1 K/UL (ref 0.1–1.2)
ALBUMIN SERPL-MCNC: 4.3 G/DL (ref 3.5–5.2)
ALBUMIN/GLOBULIN RATIO: 1.7 (ref 1–2.5)
ALP BLD-CCNC: 117 U/L (ref 35–104)
ALT SERPL-CCNC: 14 U/L (ref 5–33)
AMPHETAMINE SCREEN URINE: NEGATIVE
ANION GAP SERPL CALCULATED.3IONS-SCNC: 13 MMOL/L (ref 9–17)
AST SERPL-CCNC: 14 U/L
BARBITURATE SCREEN URINE: NEGATIVE
BASOPHILS # BLD: 0 % (ref 0–2)
BASOPHILS ABSOLUTE: 0.05 K/UL (ref 0–0.2)
BENZODIAZEPINE SCREEN, URINE: NEGATIVE
BILIRUB SERPL-MCNC: 0.2 MG/DL (ref 0.3–1.2)
BUN BLDV-MCNC: 11 MG/DL (ref 6–20)
CALCIUM SERPL-MCNC: 9.1 MG/DL (ref 8.6–10.4)
CANNABINOID SCREEN URINE: NEGATIVE
CHLORIDE BLD-SCNC: 103 MMOL/L (ref 98–107)
CO2: 23 MMOL/L (ref 20–31)
COCAINE METABOLITE, URINE: NEGATIVE
CREAT SERPL-MCNC: 0.9 MG/DL (ref 0.5–0.9)
EOSINOPHILS RELATIVE PERCENT: 1 % (ref 1–4)
ETHANOL PERCENT: <0.01 %
ETHANOL: <10 MG/DL
GFR AFRICAN AMERICAN: >60 ML/MIN
GFR NON-AFRICAN AMERICAN: >60 ML/MIN
GFR SERPL CREATININE-BSD FRML MDRD: ABNORMAL ML/MIN/{1.73_M2}
GLUCOSE BLD-MCNC: 95 MG/DL (ref 70–99)
HCG QUALITATIVE: NEGATIVE
HCT VFR BLD CALC: 43.7 % (ref 36.3–47.1)
HEMOGLOBIN: 14.6 G/DL (ref 11.9–15.1)
IMMATURE GRANULOCYTES: 0 %
LYMPHOCYTES # BLD: 13 % (ref 24–43)
MCH RBC QN AUTO: 30.5 PG (ref 25.2–33.5)
MCHC RBC AUTO-ENTMCNC: 33.4 G/DL (ref 28.4–34.8)
MCV RBC AUTO: 91.2 FL (ref 82.6–102.9)
METHADONE SCREEN, URINE: NEGATIVE
MONOCYTES # BLD: 8 % (ref 3–12)
NRBC AUTOMATED: 0 PER 100 WBC
OPIATES, URINE: NEGATIVE
OXYCODONE SCREEN URINE: NEGATIVE
PDW BLD-RTO: 14 % (ref 11.8–14.4)
PHENCYCLIDINE, URINE: NEGATIVE
PLATELET # BLD: 271 K/UL (ref 138–453)
PMV BLD AUTO: 10 FL (ref 8.1–13.5)
POTASSIUM SERPL-SCNC: 4.3 MMOL/L (ref 3.7–5.3)
RBC # BLD: 4.79 M/UL (ref 3.95–5.11)
SARS-COV-2, RAPID: NOT DETECTED
SEG NEUTROPHILS: 78 % (ref 36–65)
SEGMENTED NEUTROPHILS ABSOLUTE COUNT: 9.28 K/UL (ref 1.5–8.1)
SODIUM BLD-SCNC: 139 MMOL/L (ref 135–144)
SPECIMEN DESCRIPTION: NORMAL
TEST INFORMATION: NORMAL
TOTAL PROTEIN: 6.9 G/DL (ref 6.4–8.3)
WBC # BLD: 12.1 K/UL (ref 3.5–11.3)

## 2022-06-16 PROCEDURE — 2580000003 HC RX 258: Performed by: STUDENT IN AN ORGANIZED HEALTH CARE EDUCATION/TRAINING PROGRAM

## 2022-06-16 PROCEDURE — 80053 COMPREHEN METABOLIC PANEL: CPT

## 2022-06-16 PROCEDURE — 96374 THER/PROPH/DIAG INJ IV PUSH: CPT

## 2022-06-16 PROCEDURE — 2500000003 HC RX 250 WO HCPCS: Performed by: STUDENT IN AN ORGANIZED HEALTH CARE EDUCATION/TRAINING PROGRAM

## 2022-06-16 PROCEDURE — 85025 COMPLETE CBC W/AUTO DIFF WBC: CPT

## 2022-06-16 PROCEDURE — 80307 DRUG TEST PRSMV CHEM ANLYZR: CPT

## 2022-06-16 PROCEDURE — 84703 CHORIONIC GONADOTROPIN ASSAY: CPT

## 2022-06-16 PROCEDURE — G0480 DRUG TEST DEF 1-7 CLASSES: HCPCS

## 2022-06-16 PROCEDURE — 12004 RPR S/N/AX/GEN/TRK7.6-12.5CM: CPT

## 2022-06-16 PROCEDURE — 96372 THER/PROPH/DIAG INJ SC/IM: CPT

## 2022-06-16 PROCEDURE — 99285 EMERGENCY DEPT VISIT HI MDM: CPT

## 2022-06-16 PROCEDURE — 87635 SARS-COV-2 COVID-19 AMP PRB: CPT

## 2022-06-16 PROCEDURE — 6360000002 HC RX W HCPCS: Performed by: STUDENT IN AN ORGANIZED HEALTH CARE EDUCATION/TRAINING PROGRAM

## 2022-06-16 RX ORDER — LORAZEPAM 2 MG/ML
2 INJECTION INTRAMUSCULAR ONCE
Status: COMPLETED | OUTPATIENT
Start: 2022-06-16 | End: 2022-06-16

## 2022-06-16 RX ORDER — LORAZEPAM 2 MG/ML
1 INJECTION INTRAMUSCULAR ONCE
Status: DISCONTINUED | OUTPATIENT
Start: 2022-06-16 | End: 2022-06-16

## 2022-06-16 RX ORDER — CEPHALEXIN 500 MG/1
500 CAPSULE ORAL ONCE
Status: COMPLETED | OUTPATIENT
Start: 2022-06-16 | End: 2022-06-17

## 2022-06-16 RX ADMIN — OLANZAPINE 10 MG: 10 INJECTION, POWDER, LYOPHILIZED, FOR SOLUTION INTRAMUSCULAR at 20:32

## 2022-06-16 RX ADMIN — LORAZEPAM 2 MG: 2 INJECTION INTRAMUSCULAR; INTRAVENOUS at 21:14

## 2022-06-16 ASSESSMENT — ENCOUNTER SYMPTOMS
NAUSEA: 0
ABDOMINAL PAIN: 0
SHORTNESS OF BREATH: 0
COUGH: 0
VOMITING: 0

## 2022-06-16 NOTE — ED TRIAGE NOTES
Pt was in inpatient treatment for drug use, pt left AMA. Today pt self inflicted 2 lacerations in an attempt to commit suicide, Pt has two lacerations t left arm, covered by bandages by EMS. Bleeding is controlled. Pt is alert and oriented and ambulatory.

## 2022-06-17 ENCOUNTER — HOSPITAL ENCOUNTER (INPATIENT)
Age: 52
LOS: 3 days | Discharge: HOME | DRG: 885 | End: 2022-06-20
Attending: PSYCHIATRY & NEUROLOGY | Admitting: PSYCHIATRY & NEUROLOGY
Payer: MEDICARE

## 2022-06-17 PROBLEM — F32.A DEPRESSION WITH SUICIDAL IDEATION: Status: ACTIVE | Noted: 2022-06-17

## 2022-06-17 PROBLEM — R45.851 DEPRESSION WITH SUICIDAL IDEATION: Status: ACTIVE | Noted: 2022-06-17

## 2022-06-17 PROCEDURE — 1240000000 HC EMOTIONAL WELLNESS R&B

## 2022-06-17 PROCEDURE — 99223 1ST HOSP IP/OBS HIGH 75: CPT | Performed by: PSYCHIATRY & NEUROLOGY

## 2022-06-17 PROCEDURE — 6370000000 HC RX 637 (ALT 250 FOR IP): Performed by: STUDENT IN AN ORGANIZED HEALTH CARE EDUCATION/TRAINING PROGRAM

## 2022-06-17 PROCEDURE — 6360000002 HC RX W HCPCS: Performed by: PSYCHIATRY & NEUROLOGY

## 2022-06-17 PROCEDURE — APPSS60 APP SPLIT SHARED TIME 46-60 MINUTES: Performed by: NURSE PRACTITIONER

## 2022-06-17 PROCEDURE — 99223 1ST HOSP IP/OBS HIGH 75: CPT | Performed by: INTERNAL MEDICINE

## 2022-06-17 RX ORDER — LORAZEPAM 2 MG/ML
2 INJECTION INTRAMUSCULAR EVERY 4 HOURS PRN
Status: DISCONTINUED | OUTPATIENT
Start: 2022-06-17 | End: 2022-06-20 | Stop reason: HOSPADM

## 2022-06-17 RX ORDER — MAGNESIUM HYDROXIDE/ALUMINUM HYDROXICE/SIMETHICONE 120; 1200; 1200 MG/30ML; MG/30ML; MG/30ML
30 SUSPENSION ORAL EVERY 6 HOURS PRN
Status: DISCONTINUED | OUTPATIENT
Start: 2022-06-17 | End: 2022-06-20 | Stop reason: HOSPADM

## 2022-06-17 RX ORDER — RISPERIDONE 1 MG/1
1 TABLET, FILM COATED ORAL 2 TIMES DAILY
Status: DISCONTINUED | OUTPATIENT
Start: 2022-06-17 | End: 2022-06-20 | Stop reason: HOSPADM

## 2022-06-17 RX ORDER — SERTRALINE HYDROCHLORIDE 100 MG/1
100 TABLET, FILM COATED ORAL DAILY
Status: DISCONTINUED | OUTPATIENT
Start: 2022-06-17 | End: 2022-06-20 | Stop reason: HOSPADM

## 2022-06-17 RX ORDER — ATORVASTATIN CALCIUM 20 MG/1
20 TABLET, FILM COATED ORAL NIGHTLY
Status: DISCONTINUED | OUTPATIENT
Start: 2022-06-17 | End: 2022-06-20 | Stop reason: HOSPADM

## 2022-06-17 RX ORDER — ALBUTEROL SULFATE 90 UG/1
2 AEROSOL, METERED RESPIRATORY (INHALATION) EVERY 4 HOURS PRN
Status: DISCONTINUED | OUTPATIENT
Start: 2022-06-17 | End: 2022-06-20 | Stop reason: HOSPADM

## 2022-06-17 RX ORDER — FUROSEMIDE 40 MG/1
20 TABLET ORAL DAILY
Status: DISCONTINUED | OUTPATIENT
Start: 2022-06-17 | End: 2022-06-20 | Stop reason: HOSPADM

## 2022-06-17 RX ORDER — POLYETHYLENE GLYCOL 3350 17 G/17G
17 POWDER, FOR SOLUTION ORAL DAILY PRN
Status: DISCONTINUED | OUTPATIENT
Start: 2022-06-17 | End: 2022-06-20 | Stop reason: HOSPADM

## 2022-06-17 RX ORDER — CEPHALEXIN 500 MG/1
500 CAPSULE ORAL 4 TIMES DAILY
Qty: 20 CAPSULE | Refills: 0 | Status: SHIPPED | OUTPATIENT
Start: 2022-06-17 | End: 2022-06-22

## 2022-06-17 RX ORDER — TRAZODONE HYDROCHLORIDE 50 MG/1
50 TABLET ORAL NIGHTLY PRN
Status: DISCONTINUED | OUTPATIENT
Start: 2022-06-17 | End: 2022-06-20 | Stop reason: HOSPADM

## 2022-06-17 RX ORDER — HYDROXYZINE 50 MG/1
50 TABLET, FILM COATED ORAL 3 TIMES DAILY PRN
Status: DISCONTINUED | OUTPATIENT
Start: 2022-06-17 | End: 2022-06-20 | Stop reason: HOSPADM

## 2022-06-17 RX ORDER — ACETAMINOPHEN 325 MG/1
650 TABLET ORAL EVERY 4 HOURS PRN
Status: DISCONTINUED | OUTPATIENT
Start: 2022-06-17 | End: 2022-06-20 | Stop reason: HOSPADM

## 2022-06-17 RX ORDER — NICOTINE 21 MG/24HR
1 PATCH, TRANSDERMAL 24 HOURS TRANSDERMAL DAILY
Status: DISCONTINUED | OUTPATIENT
Start: 2022-06-17 | End: 2022-06-20 | Stop reason: HOSPADM

## 2022-06-17 RX ORDER — IBUPROFEN 400 MG/1
400 TABLET ORAL EVERY 6 HOURS PRN
Status: DISCONTINUED | OUTPATIENT
Start: 2022-06-17 | End: 2022-06-20 | Stop reason: HOSPADM

## 2022-06-17 RX ORDER — LISINOPRIL 5 MG/1
5 TABLET ORAL DAILY
Status: DISCONTINUED | OUTPATIENT
Start: 2022-06-17 | End: 2022-06-20 | Stop reason: HOSPADM

## 2022-06-17 RX ORDER — DIPHENHYDRAMINE HYDROCHLORIDE 50 MG/ML
50 INJECTION INTRAMUSCULAR; INTRAVENOUS EVERY 4 HOURS PRN
Status: DISCONTINUED | OUTPATIENT
Start: 2022-06-17 | End: 2022-06-20 | Stop reason: HOSPADM

## 2022-06-17 RX ORDER — LORAZEPAM 1 MG/1
2 TABLET ORAL EVERY 4 HOURS PRN
Status: DISCONTINUED | OUTPATIENT
Start: 2022-06-17 | End: 2022-06-20 | Stop reason: HOSPADM

## 2022-06-17 RX ORDER — BENZTROPINE MESYLATE 0.5 MG/1
0.5 TABLET ORAL 2 TIMES DAILY
Status: DISCONTINUED | OUTPATIENT
Start: 2022-06-17 | End: 2022-06-20 | Stop reason: HOSPADM

## 2022-06-17 RX ADMIN — LORAZEPAM 2 MG: 2 INJECTION INTRAMUSCULAR; INTRAVENOUS at 23:35

## 2022-06-17 RX ADMIN — DIPHENHYDRAMINE HYDROCHLORIDE 50 MG: 50 INJECTION, SOLUTION INTRAMUSCULAR; INTRAVENOUS at 23:34

## 2022-06-17 RX ADMIN — CEPHALEXIN 500 MG: 500 CAPSULE ORAL at 01:19

## 2022-06-17 ASSESSMENT — LIFESTYLE VARIABLES
HOW MANY STANDARD DRINKS CONTAINING ALCOHOL DO YOU HAVE ON A TYPICAL DAY: 1 OR 2
HOW OFTEN DO YOU HAVE A DRINK CONTAINING ALCOHOL: NEVER

## 2022-06-17 ASSESSMENT — SLEEP AND FATIGUE QUESTIONNAIRES
SLEEP PATTERN: RESTLESSNESS
AVERAGE NUMBER OF SLEEP HOURS: 6
DO YOU HAVE DIFFICULTY SLEEPING: YES
DO YOU USE A SLEEP AID: NO

## 2022-06-17 ASSESSMENT — PAIN SCALES - GENERAL: PAINLEVEL_OUTOF10: 0

## 2022-06-17 NOTE — BH NOTE
[facility]  Admission Note     Admission Type:   Admission Type: Involuntary    Reason for admission:  Reason for Admission: Suicide attempt today in the form of cutting arms. Patient has command hallucinations to harm self. Patient was found wandering down the street with a bloody arm and [employer] brought pt to [facility].  ED.     PATIENT STRENGTHS:       Patient Strengths and Limitations:       Addictive Behavior:        Medical Problems:   Past Medical History:   Diagnosis Date    Cocaine abuse ([address]        Status EXAM:  Mental Status and Behavioral Exam  Normal: No  Level of Assistance: Independent/Self  Facial Expression: Avoids Gaze,Expressionless,Flat  Affect: Unstable,Blunt  Level of Consciousness: Alert  Frequency of Checks: 4 times per hour, close  Mood:Normal: No  Mood: Depressed,Anxious,Empty,Despair  Motor Activity:Normal: Yes  Eye Contact: Poor  Observed Behavior: Withdrawn,Catatonic,Guarded  Sexual Misconduct History: Current - no  Preception: Others (comment) (unable to assess; pt not answering orientation questions)  Attention:Normal: No  Attention: Unable to concentrate  Thought Processes: Blocking  Thought Content:Normal: No  Thought Content: Poverty of content  Depression Symptoms: Loss of interest,Impaired concentration,Feelings of helplessness,Feelings of hopelessess,Feelings of worthlessness,Increased irritability  Anxiety Symptoms: Generalized  Shirley Symptoms: Labile,Poor judgment  Hallucinations: Unable to assess (pt not responding to question)  Delusions: No  Memory:Normal: No (no delusions observed; pt not repsonding to question  )  Memory: Other (comment) (VITALY pt not responding to question )  Insight and Judgment: No  Insight and Judgment: Poor judgment,Poor insight    Tobacco Screening:  Practical Counseling, on admission, hakeem X, if applicable and completed (first 3 are required if patient doesn't refuse):            ( )  Recognizing danger situations (included triggers and roadblocks)                    ( )  Coping skills (new ways to manage stress, exercise, relaxation techniques, changing routine, distraction)                                                           ( )  Basic information about quitting (benefits of quitting, techniques in how to quit, available resources  ( ) Referral for counseling faxed to [facility]                                           ( X) Patient refused counseling  ( ) Patient has not smoked in the last 30 days    Metabolic Screening:    No results found for: LABA1C    No results found for: CHOL  No results found for: TRIG  No results found for: HDL  No components found for: LDLCAL  No results found for: LABVLDL      Body mass index is 39.54 kg/m². BP Readings from Last 2 Encounters:   06/17/22 136/77   06/16/22 122/77           Pt admitted with followings belongings:        Patient's home medications were need verified. Patient oriented to surroundings and program expectations and copy of patient rights given. Received admission packet:  yes. Consents reviewed, signed no. Refused yes. Patient verbalize understanding:  refused. Patient education on precautions: yes; no evidence of learning/needs reinforced    Patient was discharged from [place] [address] on 6/14/22 to [facility]. Pt then was inpatient receiving treatment for drug use. Patient left treatment AMA and was seen walking down the street with blood dripping from his arm. [employer] was called and brought patient to [address] [place] [address] Dr. Javier ED. Per report from [facility]. Vs staff patient admitted to self inflicting 2 lacerations in an attempt to commit suicide. No active bleeding on arrival to unit and sutures are intact. Pt is alert and oriented to unit. . Patient ambulated with a steady gait but requesting to use a cane. Patient provided a walker and cane will be placed in his patient bin. Patient is not responding to the majority of admission questions. Patient ha not been cooperative during intake and assessment. Patient has a hx of command hallucinations, SI, self inflicted injuries and paranoia. Patient was provided a meal and checked for contraband.                             Rena Abdul [place]

## 2022-06-17 NOTE — GROUP NOTE
Group Therapy Note    Date: 6/17/2022    Group Start Time: 1430  Group End Time: 8922  Group Topic: Cognitive Skills    MATT Panchal , CTRS    Pt did not attend 1430 cognitive skills group d/t resting in room despite staff invitation to attend. 1:1 talk time offered as alternative to group session, pt declined.            Signature:  Ursula Matos

## 2022-06-17 NOTE — ED PROVIDER NOTES
[facility]  ED  eMERGENCY dEPARTMENT eNCOUnter   Attending Attestation     Pt Name: Gisela Wong  MRN: [id number]  [place] 1970  Date of evaluation: 6/16/22       Gisela Wong is a 45 y.o. adult who presents with Suicide Attempt      History: Patient presents with suicide attempt. Patient said that she was trying to cut her arm to get to a vein so she can bleed out. Patient says that her plan if she left the hospital would be to sit on railroad tracks. Patient says she is hearing voices in her head that are telling her to kill her self. Patient says that after she was discharged recently she did not take any of her medicines. Patient says she does not want to take medicines. Exam: Heart rate and rhythm are regular. Lungs are clear to auscultation bilaterally. Abdomen is soft, nontender. Patient awake alert. Patient is rubbing her head furiously and seems very agitated and anxious. Patient has 2 lacerations over the dorsal aspect of the left arm. Plan for labs for ALTON, will repair the 2 wounds over the left arm. I performed a history and physical examination of the patient and discussed management with the resident. I reviewed the residents note and agree with the documented findings and plan of care. Any areas of disagreement are noted on the chart. I was personally present for the key portions of any procedures. I have documented in the chart those procedures where I was not present during the key portions. I have personally reviewed all images and agree with the resident's interpretation. I have reviewed the emergency nurses triage note. I agree with the chief complaint, past medical history, past surgical history, allergies, medications, social and family history as documented unless otherwise noted below. Documentation of the HPI, Physical Exam and Medical Decision Making performed by medical students or scribes is based on my personal performance of the HPI, PE and MDM. For Phys Assistant/ Nurse Practitioner cases/documentation I have had a face to face evaluation of this patient and have completed at least one if not all key elements of the E/M (history, physical exam, and MDM). Additional findings are as noted. For APC cases I have personally evaluated and examined the patient in conjunction with the APC and agree with the treatment plan and disposition of the patient as recorded by the APC.     Razia Smith MD  Attending Emergency  Physician       Inocencio Ryder MD  06/16/22 1087

## 2022-06-17 NOTE — PLAN OF CARE
Problem: Anxiety  Goal: Will report anxiety at manageable levels  Description: INTERVENTIONS:  1. Administer medication as ordered  2. Teach and rehearse alternative coping skills  3. Provide emotional support with 1:1 interaction with staff  6/17/2022 1054 by Fátima Doan RN  Outcome: Progressing   1:1 with pt x ten minutes. Pt encouraged to attend unit programming and interact with peers and staff. Pt also encouraged to tend to hygiene and ADLs. Pt encouraged to discuss feelings with staff and feedback and reassurance provided. Pt denies thoughts of self harm and is agreeable to seeking out should thoughts of self harm arise. Safe environment maintained. Q15 minute checks for safety cont per unit policy. Will cont to monitor for safety and provides support and reassurance as needed. Pt admits to auditory hallucinations. Refused groups and breakfast. Secluaive to room for long intervals.

## 2022-06-17 NOTE — PLAN OF CARE
[facility]  Initial Interdisciplinary Treatment Plan NO      Original treatment plan Date & Time: 6/17/2022   0844    Admission Type:  Admission Type: Involuntary    Reason for admission:   Reason for Admission: Suicide attempt today in the form of cutting arms. Patient has command hallucinations to harm self. Patient was found wandering down the street with a bloody arm and [facility] brought [facility] to [facility].  ED.     Estimated Length of Stay:  5-7days  Estimated Discharge Date: to be determined by physician    PATIENT STRENGTHS:  Patient Strengths:   Patient Strengths and Limitations:   Addictive Behavior:    Medical Problems:  Past Medical History:   Diagnosis Date    Cocaine abuse ([facility] [address]      Status EXAM:Mental Status and Behavioral Exam  Normal: No  Level of Assistance: Independent/Self  Facial Expression: Avoids Gaze,Expressionless,Flat  Affect: Unstable,Blunt  Level of Consciousness: Alert  Frequency of Checks: 4 times per hour, close  Mood:Normal: No  Mood: Depressed,Anxious,Empty,Despair  Motor Activity:Normal: Yes  Eye Contact: Poor  Observed Behavior: Withdrawn,Catatonic,Guarded  Sexual Misconduct History: Current - no  Preception: Others (comment) (unable to assess; pt not answering orientation questions)  Attention:Normal: No  Attention: Unable to concentrate  Thought Processes: Blocking  Thought Content:Normal: No  Thought Content: Poverty of content  Depression Symptoms: Loss of interest,Impaired concentration,Feelings of helplessness,Feelings of hopelessess,Feelings of worthlessness,Increased irritability  Anxiety Symptoms: Generalized  Shirley Symptoms: Labile,Poor judgment  Hallucinations: Unable to assess (pt not responding to question)  Delusions: No  Memory:Normal: No (no delusions observed; pt not repsonding to question  )  Memory: Other (comment) (VITALY pt not responding to question )  Insight and Judgment: No  Insight and Judgment: Poor judgment,Poor insight    EDUCATION:   Learner Progress Toward Treatment Goals: reviewed group plans and strategies for care    Method:group therapy, medication compliance, individualized assessments and care planning    Outcome: needs reinforcement    PATIENT GOALS: to be discussed with patient within 72 hours    PLAN/TREATMENT RECOMMENDATIONS:     continue group therapy , medications compliance, goal setting, individualized assessments and care, continue to monitor pt on unit      SHORT-TERM GOALS:   Time frame for Short-Term Goals: 5-7 days    LONG-TERM GOALS:  Time frame for Long-Term Goals: 6 months  Members Present in Team Meeting: See Signature Sheet    TREVIN Bowen

## 2022-06-17 NOTE — PLAN OF CARE
Anxiety  Goal: Will report anxiety at manageable levels  Description: INTERVENTIONS:  1. Administer medication as ordered  2. Teach and rehearse alternative coping skills  3. Provide emotional support with 1:1 interaction with staff  6/17/2022 1949 by Jesus Kolb RN  Outcome: Progressing   Declines PRN's for anxiety but is visibly anxious.

## 2022-06-17 NOTE — BH NOTE
Patient given tobacco quitline number 76296933527 at this time, refusing to call at this time, states \" I just dont want to quit now\"- patient given information as to the dangers of long term tobacco use. Continue to reinforce the importance of tobacco cessation.

## 2022-06-17 NOTE — ED NOTES
The patient is a 45year old female that identifies as a female that has a history of Schizoaffective Disorder and Borderline Personality disorder. The patient came to the ED reporting to the Resident that she attempted to kill herself today via cutting her arm. Patient did have two lacerations to her arm that were 4 and 6 cm in length. Patient stated to the Resident that she was trying to cut a vein. Patient then stated that if she is discharged she will go find a train track and wait for train to run her over. Patient also reporting command hallucinations telling her to kill herself. Patient states to this SW that she was feeling overwhelmed today and cut her self. Per the patient she left [facility] AM because she felt paranoid at their center. Patient reports that she does want to leave the ER. Patient will not state whether she is or was feeling suicidal today. And patient not able to contract for safety with this SW.  Patient reports that she was discharged from the [facility] on 06.14.2022

## 2022-06-17 NOTE — PROGRESS NOTES
Pharmacy Medication History Note      List of current medications patient is taking is complete. Patient was discharged from [facility] on 6/14/22. No change to medication list from AVS. Patient participated in meds to beds on discharge but sertraline, risperidone, benztropine, furosemide, and atorvastatin were all refill too soon. Patient was prescribed Cephalexin yesterday in ER for self inflicted lacerations. Please let me know if you have any questions about this encounter. Thank you!     Electronically signed by DILIA [facility] on 6/17/2022 at 8:29 AM

## 2022-06-17 NOTE — PROGRESS NOTES
Behavioral Services  Medicare Certification Upon Admission    I certify that this patient's inpatient psychiatric hospital admission is medically necessary for:    [x] (1) Treatment which could reasonably be expected to improve this patient's condition,       [x] (2) Or for diagnostic study;     AND     [x](2) The inpatient psychiatric services are provided while the individual is under the care of a physician and are included in the individualized plan of care.     Estimated length of stay/service 3-9 days    Plan for post-hospital care -outpatient care    Electronically signed by Justin Peña MD on 6/17/2022 at 1:34 PM
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
## 2022-06-17 NOTE — ED NOTES
[] [place]    [] [address]    [x]  One [facility] ASSESSMENT      Y  N     [x] [] In the past two weeks have you had thoughts of hurting yourself in any way? [x] [] In the past two weeks have you had thoughts that you would be better off dead? [x] [] Have you made a suicide attempt in the past two months? [] [x] Do you have a plan for hurting yourself or suicide? [x] [] Presence of hallucinations/voices related to hurting himself or herself or someone else. SUICIDE/SECURITY WATCH PRECAUTION CHECKLIST     Orders    [x]  Suicide/Security Watch Precautions initiated as checked below:   6/16/22 8:59 PM EDT 28/28    [x] Notified physician:  Mathew Almazan MD  6/16/22 8:59 PM EDT    [x] Orders obtained as appropriate:     [x] 1:1 Observer     [] Psych Consult     [] Psych Consult    Name:  Date:  Time:    [x] 1:1 Observer, Notified by:  Marcos Crowell RN    Contact Nurse Supervisor    [x] Remove all personal clothes from room and place in snap/paper gown/pants. Slipper only    [x] Remove all personal belongings from room and secured away from patient. Documentation    [x] Initiate Suicide/Security Watch Precaution Flow Sheet    [x] Initiate individualized Care Plan/Problem    [x] Document why precautions initiated on flow sheet (Initiate Nursing Care Plan/Problem)    [x] 1:1 Observer in place; instructions provided. Suicide precautions require observer be within arms length. [x] Nurse-Observer Communication Hand-off initiated by RN, reviewed with Observer. Subsequently used as Hand Off between Observers. [x] Initiate every 15 minute observations per observer as delegated by the RN.     [x] Initiate RN assessment and documentation    Environmental Scan  Search Criteria and Process: OPTIONAL, see Search Policy    [] Reason for search:    [x] Nursing in presence of second person to search patient    [x] Patient notified of reason for body assessment and belongings search:     Persons present during search: Marge Cullen RN. Marisa Story RN. Results of search and disposition:       Searchers Name: [facility]    These items or items similar should be removed from the room:   [] Chairs   [] Telephone   [] Trash cans and liners   [] Plastic utensils (order Patient Safety tray)   [] Empty or remove Sharps containers   [x] All personal clothing/belongings removed   [] All unnecessary lead wires, electrical cords, draw cords, etc.   [] Flowers and plants   [x] Double check for lighters, matches, razors, any glass items etc that can be used as weapons. Person completing Checklist: Mak Mixon RN       GENERAL INFORMATION     Y  N     [x] [] Has the patient been informed that they are on a watch and what that means? [x] [] Can the patient get out of Bed without nursing assistance? [] [x] Can the patient use the restroom without nursing assistance? [x] [] Can the patient walk the halls to [place] their legs? \"   [] [x] Does the patient have metal utensils? [x] [] Have the patient's belongings been placed out of control of the patient? [x] [] Have the patient and his/her belongings been checked for contraband? [x] [] Is the patient under any visitor restrictions? If Yes, explain:   [] [x] Is the patient under an alias? [facility] Name:   Authorized visitors (no more than two are to be on the list)   Name/Relationship:   Name/Relationship:    Name of Staff member that you  Received this information from?: Rahul Feliz 49 Description:    Norm Show 28/28 adult 45 y.o. Admission weight:      Race: [x]  [] Black  []   []   [] [place] [] Other  Facial Hair:  [] Yes  [x] No  If yes, please describe: Identifying Marks (i.e. Visible tattoos, scars, etc... ):     NURSING CARE PLAN    Nursing Diagnosis: Risk of Self Directed Harm  [x] Actual  [] Potential  Date Started: 6/16/22      Etiological Factors: (related to)  [x] Expressed or implied suicidal ideation/behavior  [] Depression  [x] Suicide attempt      [] Low self-esteem  [x] Hallucinations      [] Feeling of Hopelessness  [] Substance abuse or withdrawal    [] Dysfunctional family  [] Major traumatic event, eg., divorce, etc   [] Excessive stress/anxiety    6/16/22    Expected Outcomes    Patient will:   [x] Patient will remain safe for the duration of their stay   [x] Patient's environment will be safe, eg. Free of potential suicide weapons   [] Verbalize Recovery from suicidal episode and improvement in self-worth   [x] Discuss feeling that precipitated suicide attempt/thoughts/behavior   [] Will describe available resources for crisis prevention and management   [] Will verbalize positive coping skills     Nursing Intervention   [x] Assessment and Observations hourly   [x] Suicide Precautions implemented with patient, should be 1:1 observation   [x] Document observation [id number] and RN assessment hourly   [] Consult physician for:    [] Psychiatric consult    [] Pharmacological therapy    [] Other:    [x] Patient search completed by security   [x] Initiated appropriate safety protocols by removing from the patient's environment anything that could be used to inflict self injury, eg. Order safe tray, snap gown, etc   [x] Maintain open, warm, caring, non-judgmental attitude/manner towards patient   [] Discuss advantages and disadvantages of existing coping methods/skills   [x] Assist and educate patient with identifying present strengths and coping skills   [x] Keep patient informed regarding plan of care and provide clear concise explanations. Provide the patient/family education information as well as telephone numbers and other information about crisis centers, hot lines, and counselors.     Discharge Planning:   [] Referral  [] Groups [] Health agencies  [] Other:          Smiley Green RN  06/16/22 [id number]

## 2022-06-17 NOTE — PROGRESS NOTES
Touching/picking @ stitches. Offered talk time which is declined as well as PRN med's. I'm requesting room door be left open for his safety. Will continue support/reassurance.

## 2022-06-17 NOTE — ED PROVIDER NOTES
[address]  ED  Emergency Department  Emergency Medicine Resident Sign-out     Care of [facility] was assumed from Dr. Karrie Noe and is being seen for Suicide Attempt  . The patient's initial evaluation and plan have been discussed with the prior provider who initially evaluated the patient. EMERGENCY DEPARTMENT COURSE / MEDICAL DECISION MAKING:       MEDICATIONS GIVEN:  Orders Placed This Encounter   Medications    DISCONTD: LORazepam (ATIVAN) injection 1 mg    OLANZapine (ZYPREXA) 10 mg in sterile water 2 mL injection    LORazepam (ATIVAN) injection 2 mg    cephALEXin (KEFLEX) capsule 500 mg     Order Specific Question:   Antimicrobial Indications     Answer:   Skin and Soft Tissue Infection     Order Specific Question:   Skin duration of therapy     Answer:   7 days       LABS / RADIOLOGY:     Labs Reviewed   CBC WITH AUTO DIFFERENTIAL - Abnormal; Notable for the following components:       Result Value    WBC 12.1 (*)     Seg Neutrophils 78 (*)     Lymphocytes 13 (*)     Segs Absolute 9.28 (*)     All other components within normal limits   COMPREHENSIVE METABOLIC PANEL - Abnormal; Notable for the following components:    Alkaline Phosphatase 117 (*)     Total Bilirubin 0.20 (*)     All other components within normal limits   COVID-19, RAPID   HCG, SERUM, QUALITATIVE   URINE DRUG SCREEN   ETHANOL       XR CHEST PORTABLE    Result Date: 6/10/2022  EXAMINATION: ONE XRAY VIEW OF THE CHEST 6/10/2022 1:10 pm COMPARISON: None. HISTORY: ORDERING SYSTEM PROVIDED HISTORY: wbc elevated , concern for pna TECHNOLOGIST PROVIDED HISTORY: wbc elevated , concern for pna Reason for Exam: upright port FINDINGS: A portable upright frontal view chest radiograph was obtained. Lung volumes are low. The heart size, mediastinal contour, and pleural spaces are within normal limits for technique. The lungs are grossly clear. There is no focal consolidation or pneumothorax.   The pulmonary vascular pattern is within normal limits. No acute thoracic osseous abnormality. Hypoinflated, clear lungs. RECENT VITALS:     Temp: 98.7 °F (37.1 °C),  Heart Rate: 71, Resp: 16, BP: (!) 106/57, SpO2: 92 %    This patient is a 45 y.o. Adult with history of acute psychosis, borderline personality sorter, multiple substance abuse who presents with 2 self-inflicted lacerations to her left upper extremity. Patient was seen last week for similar complaint which was repaired and patient was transferred to [address] [place] [address]. Patient told [facility] she felt \"better \"and was discharged a few days prior to today. Patient also states she has stopped taking all of her psychiatric medications because she does not like how they make him feel. Patient also reports that she was \"at cocaine rehab \"but left because they were trying to poison her with her food. Patient admits to hearing voices commanding her to hurt herself today and patient self-inflicted 2 lacerations to her left upper extremity with glass she found on the road. Patient did state that if she left the hospital today she would go on the train tracks until a train hit her. Lacerations have been repaired, patient has been started on Keflex as prior laceration does appear to have some surrounding erythema. Labs obtained, unremarkable, social work evaluated the patient, medically cleared for transfer. Patient subsequently accepted and transferred. OUTSTANDING TASKS / RECOMMENDATIONS:    1. Follow-up social work  2. Transfer with prescription for Keflex     FINAL IMPRESSION:     1. Hallucinations    2. Suicidal behavior with attempted self-injury ([facility] [address]        DISPOSITION:         DISPOSITION:  []  Discharge   [x]  Transfer -    []  Admission -     []  Against Medical Advice   []  Eloped   FOLLOW-UP: No follow-up provider specified.    DISCHARGE MEDICATIONS: New Prescriptions    No medications on file           Medina Soto MD  Emergency Medicine Resident  [facility] Pedro Grande MD  Resident  06/17/22 [id number]

## 2022-06-17 NOTE — GROUP NOTE
Group Therapy Note    Date: 6/17/2022    Group Start Time: 7658  Group End Time: 7190  Group Topic: Psychotherapy    MATT Stephen        Group Therapy Note           Patient refused to attend psychotherapy group after encouragement from staff. 1:1 talk time offered but refused. Signature:   Saulo Stephen

## 2022-06-17 NOTE — BH NOTE
Patient offered phone numbers out of cell phone, he refused needing any. Patient educated that phone will be locked up with security until discharge.

## 2022-06-17 NOTE — BH NOTE
Patient comes out to the dayroom, writer approach patient, poor eye contact, would only shake his head NO, when asked about taking medications.

## 2022-06-17 NOTE — ED PROVIDER NOTES
[facility] ED  Emergency Department Encounter  Emergency Medicine Resident     Pt Name: Garrett Baxter  MRN: [id number]  [place] 1970  Date of evaluation: 6/16/22  PCP:  No primary care provider on file. CHIEF COMPLAINT       Chief Complaint   Patient presents with    Suicide Attempt       HISTORY OFPRESENT ILLNESS  (Location/Symptom, Timing/Onset, Context/Setting, Quality, Duration, Modifying Factors,Severity.)      Garrett Baxter is a 45 y.o. adult with past medical history of borderline personality sorter, alcoholism, cocaine abuse, hyperlipidemia, hypertension who presents with complaints of self-inflicted laceration. Patient was recently seen at [facility] and upon discharge he states she stopping her medications because she \"did not like how they make him feel \". Patient states he cut herself and attempt to \"cut her veins \". Patient does admit to thoughts of wanting to harm himself as well as suicidal ideation. Patient states he was just recently raised from \"cocaine rehab \"and states he left because she felt they were trying to poison him. Patient does currently endorse commanding auditory hallucinations and states that they are directing her to hurt himseld. He states if he were discharged from the hospital today he would go and sit on the train tracks until he was struck by a train. PAST MEDICAL / SURGICAL / SOCIAL / FAMILY HISTORY      has a past medical history of Cocaine abuse ([address] has a past surgical history that includes Salpingo-oophorectomy and Dilation and curettage of uterus.     Social History     Socioeconomic History    Marital status: Single     Spouse name: Not on file    Number of children: Not on file    Years of education: Not on file    Highest education level: Not on file   Occupational History    Not on file   Tobacco Use    Smoking status: Current Every Day Smoker     Types: Cigarettes    Smokeless tobacco: Never Used   Substance and Sexual Activity    Alcohol use: Not Currently    Drug use: Not on file    Sexual activity: Not Currently   Other Topics Concern    Not on file   Social History Narrative    Not on file     Social Determinants of Health     Financial Resource Strain:     Difficulty of Paying Living Expenses: Not on file   Food Insecurity:     Worried About Running Out of Food in the Last Year: Not on file    Ele of Food in the Last Year: Not on file   Transportation Needs:     Lack of Transportation (Medical): Not on file    Lack of Transportation (Non-Medical): Not on file   Physical Activity:     Days of Exercise per Week: Not on file    Minutes of Exercise per Session: Not on file   Stress:     Feeling of Stress : Not on file   Social Connections:     Frequency of Communication with Friends and Family: Not on file    Frequency of Social Gatherings with Friends and Family: Not on file    Attends Sikh Services: Not on file    Active Member of [address] [place] [address] or Organizations: Not on file    Attends Club or Organization Meetings: Not on file    Marital Status: Not on file   Intimate Partner Violence:     Fear of Current or Ex-Partner: Not on file    Emotionally Abused: Not on file    Physically Abused: Not on file    Sexually Abused: Not on file   Housing Stability:     Unable to Pay for Housing in the Last Year: Not on file    Number of [place] in the Last Year: Not on file    Unstable Housing in the Last Year: Not on file       No family history on file. Allergies:  Haldol [haloperidol], Pcn [penicillins], Sulfa antibiotics, and Tegretol [carbamazepine]    Home Medications:  Prior to Admission medications    Medication Sig Start Date End Date Taking?  Authorizing Provider   sertraline (ZOLOFT) 100 MG tablet Take 1 tablet by mouth daily 6/15/22   Jayashree Vanegas MD   risperiDONE (RISPERDAL) 1 MG tablet Take 1 tablet by mouth 2 times daily 6/14/22 7/14/22  Jayashree Vanegas MD   furosemide (LASIX) 20 MG tablet Take 1 tablet by mouth daily 6/14/22 8/19/22  Good Quintero MD   benztropine (COGENTIN) 0.5 MG tablet Take 1 tablet by mouth 2 times daily 6/14/22   Good Quintero MD   atorvastatin (LIPITOR) 20 MG tablet Take 1 tablet by mouth nightly 6/14/22 9/12/22  Good Quintero MD   albuterol sulfate (PROAIR RESPICLICK) 437 (90 Base) MCG/ACT aerosol powder inhalation Inhale 2 puffs into the lungs every 4 hours as needed for Wheezing or Shortness of Breath 6/14/22   Good Quintero MD   meloxicam (MOBIC) 15 MG tablet Take 15 mg by mouth daily 1/24/22 6/26/22  Historical Provider, MD       REVIEW OF SYSTEMS    (2-9 systems for level 4, 10 or more for level 5)      Review of Systems   Constitutional: Negative for fever. HENT: Negative for congestion. Respiratory: Negative for cough and shortness of breath. Cardiovascular: Negative for chest pain. Gastrointestinal: Negative for abdominal pain, nausea and vomiting. Genitourinary: Negative for dysuria. Musculoskeletal: Negative for myalgias. Skin: Positive for wound. Negative for rash. Neurological: Negative for headaches. Psychiatric/Behavioral: Positive for hallucinations and suicidal ideas. PHYSICAL EXAM   (up to 7 for level 4, 8 or more for level 5)     INITIAL VITALS:    oral temperature is 98.7 °F (37.1 °C). His blood pressure is 106/57 (abnormal) and his pulse is 71. His respiration is 16 and oxygen saturation is 92%. Physical Exam  Constitutional:       Comments: Awake, answers questions, patient appears agitated, rocking back and forth, disheveled, restless, cannot sit still, no acute respiratory distress   HENT:      Head: Normocephalic and atraumatic. Eyes:      Pupils: Pupils are equal, round, and reactive to light. Cardiovascular:      Rate and Rhythm: Normal rate and regular rhythm. Pulmonary:      Effort: Pulmonary effort is normal. No respiratory distress. Breath sounds: Normal breath sounds. No wheezing. Abdominal:      General: Abdomen is flat. Palpations: Abdomen is soft. Tenderness: There is no abdominal tenderness. Musculoskeletal:      Right lower leg: No edema. Left lower leg: No edema. Skin:     Comments: Patient has old healing laceration present in her left forearm with surrounding erythema but no purulent drainage. Patient does have 6 cm laceration present proximal to her left wrist as well as additional 3 cm laceration present on her left forearm, no active bleeding, no pulsatile bleeding   Psychiatric:      Comments: Patient appears disheveled, rocking head back-and-forth, she did appears extremely anxious           DIFFERENTIAL  DIAGNOSIS     PLAN (LABS / Melanie Hoose / EKG):  Orders Placed This Encounter   Procedures    COVID-19, Rapid    CBC with Auto Differential    Comprehensive Metabolic Panel    HCG Qualitative, Serum    Urine Drug Screen    Ethanol       MEDICATIONS ORDERED:  Orders Placed This Encounter   Medications    DISCONTD: LORazepam (ATIVAN) injection 1 mg    OLANZapine (ZYPREXA) 10 mg in sterile water 2 mL injection    LORazepam (ATIVAN) injection 2 mg    cephALEXin (KEFLEX) capsule 500 mg     Order Specific Question:   Antimicrobial Indications     Answer:   Skin and Soft Tissue Infection     Order Specific Question:   Skin duration of therapy     Answer:   7 days       DDX: Laceration, cellulitis, suicidal ideation, auditory hallucination    Initial MDM/Plan: 45 y.o. adult who presents with complaint of suicidal ideation as well as self-inflicted lacerations and attempt to commit suicide. Has been off her meds for several days. Seen and examined, he appears disheveled, cannot sit still, rocking head back-and-forth, shaking arms. Vital signs are unremarkable. Physical examination markable for multiple lacerations sustained to left upper extremity. Old laceration does have surrounding erythema concerning for possible cellulitis.   Patient states that he used a piece of glass found on the ground to cut himself. Concern for possible infection given the patient also reports this is how he cut himself last week. Last tetanus 2020. Will give patient Zyprexa for symptomatic control of acute psychiatric illness, plan for laceration repair, [facility] labs. DIAGNOSTIC RESULTS / EMERGENCY DEPARTMENT COURSE / MDM     LABS:  Labs Reviewed   CBC WITH AUTO DIFFERENTIAL - Abnormal; Notable for the following components:       Result Value    WBC 12.1 (*)     Seg Neutrophils 78 (*)     Lymphocytes 13 (*)     Segs Absolute 9.28 (*)     All other components within normal limits   COMPREHENSIVE METABOLIC PANEL - Abnormal; Notable for the following components:    Alkaline Phosphatase 117 (*)     Total Bilirubin 0.20 (*)     All other components within normal limits   COVID-19, RAPID   HCG, SERUM, QUALITATIVE   URINE DRUG SCREEN   ETHANOL         RADIOLOGY:  No results found. EMERGENCY DEPARTMENT COURSE:     Given Zyprexa but still remained anxious and restless. Given 2 mg of Ativan. Laceration repair performed tolerated well. Labs unremarkable. At this point patient is medically cleared for psychiatric evaluation and treatment. Patient requested discharge and declined to sign voluntary treatment form, given patient admits to suicidal ideations as well as attempted self-harm tonight and admits to commanding auditory hallucinations and states that if he were to leave tonight he would go sit on the train tracks until he is hit by a train I do not feel the patient is safe for discharge and would benefit from psychiatric consultation. Patient to be pink slipped by myself, signed out to oncoming resident awaiting transfer for psychiatric evaluation.     PROCEDURES:  Lac Repair    Date/Time: 6/16/2022 10:48 PM  Performed by: Briseyda Hernandez DO  Authorized by: Yin Blue MD     Consent:     Consent obtained:  Verbal    Consent given by:  Patient    Risks discussed:  Infection and pain  Anesthesia (see MAR for exact dosages): Anesthesia method:  Local infiltration    Local anesthetic:  Lidocaine 1% w/o epi  Laceration details:     Location:  Shoulder/arm    Shoulder/arm location:  L lower arm    Wound length (cm): First laceration 6 centimeters, second laceration 3 cm. Pre-procedure details:     Preparation:  Patient was prepped and draped in usual sterile fashion  Exploration:     Hemostasis achieved with:  Direct pressure    Wound exploration: wound explored through full range of motion      Wound extent: no areolar tissue violation noted, no fascia violation noted, no foreign bodies/material noted and no muscle damage noted      Contaminated: no    Treatment:     Area cleansed with:  Saline    Amount of cleaning:  Standard    Irrigation solution:  Sterile saline    Irrigation volume:  1000 cc    Irrigation method:  Pressure wash  Skin repair:     Repair method:  Sutures    Suture size:  4-0    Suture material:  Nylon    Number of sutures:  9  Approximation:     Approximation:  Close  Post-procedure details:     Dressing:  Open (no dressing)    Patient tolerance of procedure: Tolerated well, no immediate complications        CONSULTS:  None    CRITICAL CARE:  Please see attending note    FINAL IMPRESSION      1. Hallucinations    2. Suicidal behavior with attempted self-injury [facility])          DISPOSITION / PLAN     DISPOSITION Decision To Transfer 06/16/2022 10:03:42 PM        PATIENTREFERRED TO:  No follow-up provider specified.     DISCHARGE MEDICATIONS:  New Prescriptions    No medications on file       Duke Pineda DO  EmergencyMedicine Resident    (Please note that portions of this note were completed with a voice recognition program.  Efforts were made to edit the dictations but occasionally words are mis-transcribed.)       Duke Pineda DO  Resident  06/16/22 [facility],   Resident  06/16/22 [id number]
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
## 2022-06-17 NOTE — H&P
[facility] Internal Medicine  Lukas Kovacs MD; Sharon Marie MD; Amanda Lomas MD; MD Anne Honeycutt MD; MD IVANIA Szymanski [facility] Internal Medicine   [facility]    HISTORY AND PHYSICAL EXAMINATION            Date:   6/17/2022  Patient name:  Gisela Wong  Date of admission:  6/17/2022  1:44 AM  MRN:   [id number]  Account:  [de-identified]  YOB: 1970  PCP:    No primary care provider on file. Room:   [address] [place] [address]  Code Status:    Full Code    Chief Complaint:     No chief complaint on file. arm laceration      htn  hld    History Obtained From:     Patient medical record nursing staff    History of Present Illness:     Gisela Wong is a 45 y.o. Declined adult who presents with No chief complaint on file. and is admitted to the hospital for the management of Schizoaffective disorder, depressive type ([facility] [address] HLD  Onset more than 5 years ago  Severity is mild, not getting worse  Not associated with pancreatitis  Tolerating statin well no muscle pain    HTN  Onset more than 2 years ago  anderson mild to mod  unControlled with current po meds  Not associated with headaches or blurry vision  No chest pain    Not taking meds        Past Medical History:     Past Medical History:   Diagnosis Date    Cocaine abuse ([facility] [address]         Past Surgical History:     Past Surgical History:   Procedure Laterality Date    DILATION AND CURETTAGE OF UTERUS      SALPINGO-OOPHORECTOMY          Medications Prior to Admission:     Prior to Admission medications    Medication Sig Start Date End Date Taking?  Authorizing Provider   cephALEXin (KEFLEX) 500 MG capsule Take 1 capsule by mouth 4 times daily for 5 days 6/17/22 6/22/22  Ce Pretty MD   sertraline (ZOLOFT) 100 MG tablet Take 1 tablet by mouth daily 6/15/22   Giuliana Alexis MD   risperiDONE (RISPERDAL) 1 MG tablet Take 1 tablet by mouth 2 times daily 6/14/22 7/14/22  Mikey Das pain, tingling extremities      Physical Exam:   /77   Pulse 96   Temp 97.8 °F (36.6 °C) (Oral)   Resp 14   Ht 5' 6\" (1.676 m)   Wt 245 lb (111.1 kg)   BMI 39.54 kg/m²   Temp (24hrs), Av °F (36.7 °C), Min:97.6 °F (36.4 °C), Max:98.7 °F (37.1 °C)    No results for input(s): POCGLU in the last 72 hours.   No intake or output data in the 24 hours ending 22 1844    General Appearance: alert, well appearing, and in no acute distress  Mental status: oriented to person, place, and time  Head: normocephalic, atraumatic  Eye: no icterus, redness, pupils equal and reactive, extraocular eye movements intact, conjunctiva clear  Ear: normal external ear, no discharge, hearing intact  Nose: no drainage noted  Mouth: mucous membranes moist  Neck: supple, no carotid bruits, thyroid not palpable  Lungs: Bilateral equal air entry, clear to ausculation, no wheezing, rales or rhonchi, normal effort  Cardiovascular: normal rate, regular rhythm, no murmur, gallop, rub  Abdomen: Soft, nontender, nondistended, normal bowel sounds, no hepatomegaly or splenomegaly  Neurologic: There are no new focal motor or sensory deficits, normal muscle tone and bulk, no abnormal sensation, normal speech, cranial nerves II through XII grossly intact  Skin: No gross lesions, rashes, bruising or bleeding on exposed skin area  Extremities: peripheral pulses palpable, no pedal edema or calf pain with palpation  Multiple superficial lacerations noted in the left arm 2 of those have multiple interrupted sutures in place no signs of infection    Investigations:      Laboratory Testing:  Recent Results (from the past 24 hour(s))   CBC with Auto Differential    Collection Time: 22  8:38 PM   Result Value Ref Range    WBC 12.1 (H) 3.5 - 11.3 k/uL    RBC 4.79 3.95 - 5.11 m/uL    Hemoglobin 14.6 11.9 - 15.1 g/dL    Hematocrit 43.7 36.3 - 47.1 %    MCV 91.2 82.6 - 102.9 fL    MCH 30.5 25.2 - 33.5 pg    MCHC 33.4 28.4 - 34.8 g/dL    RDW 14.0 11.8 - 14.4 %    Platelets 620 359 - 269 k/uL    MPV 10.0 8.1 - 13.5 fL    NRBC Automated 0.0 0.0 per 100 WBC    Seg Neutrophils 78 (H) 36 - 65 %    Lymphocytes 13 (L) 24 - 43 %    Monocytes 8 3 - 12 %    Eosinophils % 1 1 - 4 %    Basophils 0 0 - 2 %    Immature Granulocytes 0 0 %    Segs Absolute 9.28 (H) 1.50 - 8.10 k/uL    Absolute Lymph # 1.58 1.10 - 3.70 k/uL    Absolute Mono # 1.00 0.10 - 1.20 k/uL    Absolute Eos # 0.13 0.00 - 0.44 k/uL    Basophils Absolute 0.05 0.00 - 0.20 k/uL    Absolute Immature Granulocyte 0.04 0.00 - 0.30 k/uL   Comprehensive Metabolic Panel    Collection Time: 06/16/22  8:38 PM   Result Value Ref Range    Glucose 95 70 - 99 mg/dL    BUN 11 6 - 20 mg/dL    CREATININE 0.90 0.50 - 0.90 mg/dL    Calcium 9.1 8.6 - 10.4 mg/dL    Sodium 139 135 - 144 mmol/L    Potassium 4.3 3.7 - 5.3 mmol/L    Chloride 103 98 - 107 mmol/L    CO2 23 20 - 31 mmol/L    Anion Gap 13 9 - 17 mmol/L    Alkaline Phosphatase 117 (H) 35 - 104 U/L    ALT 14 5 - 33 U/L    AST 14 <32 U/L    Total Bilirubin 0.20 (L) 0.3 - 1.2 mg/dL    Total Protein 6.9 6.4 - 8.3 g/dL    Albumin 4.3 3.5 - 5.2 g/dL    Albumin/Globulin Ratio 1.7 1.0 - 2.5    GFR Non-African American >60 >60 mL/min    GFR African American >60 >60 mL/min    GFR Comment         HCG Qualitative, Serum    Collection Time: 06/16/22  8:38 PM   Result Value Ref Range    hCG Qual NEGATIVE NEGATIVE   Ethanol    Collection Time: 06/16/22  8:38 PM   Result Value Ref Range    Ethanol <10 <10 mg/dL    Ethanol percent <0.010 <0.010 %   Urine Drug Screen    Collection Time: 06/16/22  8:39 PM   Result Value Ref Range    Amphetamine Screen, Ur NEGATIVE NEGATIVE    Barbiturate Screen, Ur NEGATIVE NEGATIVE    Benzodiazepine Screen, Urine NEGATIVE NEGATIVE    Cocaine Metabolite, Urine NEGATIVE NEGATIVE    Methadone Screen, Urine NEGATIVE NEGATIVE    Opiates, Urine NEGATIVE NEGATIVE    Phencyclidine, Urine NEGATIVE NEGATIVE    Cannabinoid Scrn, Ur NEGATIVE NEGATIVE    Oxycodone Screen, Ur NEGATIVE NEGATIVE    Test Information       Assay provides medical screening only. The absence of expected drug(s) and/or metabolite(s) may indicate diluted or adulterated urine, limitations of testing or timing of collection. COVID-19, Rapid    Collection Time: 06/16/22  8:39 PM    Specimen: Nasopharyngeal Swab   Result Value Ref Range    Specimen Description . NASOPHARYNGEAL SWAB     SARS-CoV-2, Rapid Not Detected Not Detected       Imaging/Diagnostics:  No results found. Assessment :      Hospital Problems           Last Modified POA    * (Principal) Schizoaffective disorder, depressive type ([facility] [address] 6/17/2022 Yes    Depression with suicidal ideation 6/17/2022 Yes          Plan:     71-year-old patient   hypertension start lisinopril 5 mg  Hyperlipidemia start Lipitor 20 mg  Left arm multiple superficial lacerations to both lacerations have interrupted sutures in place  Recommended to remove the sutures on June 30 or afterwards  Morbid obesity BMI 40 weighs 245 pounds diet exercise weight loss advised  Patient is not interested              Constantino Tong MD  6/17/2022  6:44 PM    Copy sent to Dr. Roxane Bloch primary care provider on file. Please note that this chart was generated using voice recognition Dragon dictation software. Although every effort was made to ensure the accuracy of this automated transcription, some errors in transcription may have occurred.

## 2022-06-18 PROCEDURE — 6360000002 HC RX W HCPCS: Performed by: PSYCHIATRY & NEUROLOGY

## 2022-06-18 PROCEDURE — 1240000000 HC EMOTIONAL WELLNESS R&B

## 2022-06-18 PROCEDURE — 99232 SBSQ HOSP IP/OBS MODERATE 35: CPT | Performed by: NURSE PRACTITIONER

## 2022-06-18 RX ADMIN — LORAZEPAM 2 MG: 2 INJECTION INTRAMUSCULAR; INTRAVENOUS at 16:30

## 2022-06-18 RX ADMIN — DIPHENHYDRAMINE HYDROCHLORIDE 50 MG: 50 INJECTION, SOLUTION INTRAMUSCULAR; INTRAVENOUS at 16:30

## 2022-06-18 RX ADMIN — DIPHENHYDRAMINE HYDROCHLORIDE 50 MG: 50 INJECTION, SOLUTION INTRAMUSCULAR; INTRAVENOUS at 23:57

## 2022-06-18 RX ADMIN — LORAZEPAM 2 MG: 2 INJECTION INTRAMUSCULAR; INTRAVENOUS at 23:57

## 2022-06-18 NOTE — PROGRESS NOTES
Daily Progress Note  6/18/2022    Patient Name: Fito Mehtachild: Depression with suicide attempt         SUBJECTIVE:      Patient is seen today for a follow up assessment. Staff and patient to her room where he is visible to the nurses station. They report that he continues to isolate to his room, refused to cooperate with morning vitals or take medications. He did receive as needed medication yesterday evening for repeatedly engaging in self-injurious behavior by hitting himself in the head and responding to internal stimuli. Upon assessment today he does verbally respond to questions in a very short and irritable tone using 1-2 word answers. He continues to endorse depression as well as suicidal thoughts. He states \"just let me out of here you cannot stop me\". When asked to clarify about stopping him he refused to respond. He is taking his lunch to his room, so he is at least eating though denies any intent to take medications. Appetite:  [] Adequate/Unchanged  [] Increased  [x] Decreased      Sleep:       [x] Adequate/Unchanged  [] Fair  [] Poor      Group Attendance on Unit:   [] Yes   [] Selectively    [x] No    Compliant with scheduled medications: [] Yes  [x] No    Received emergency medications in past 24 hrs: [x] Yes   [] No    Medication Side Effects: Refusing all medications         Mental Status Exam  Level of consciousness: Alert and awake   Appearance: Appropriate attire for setting, resting in bed, with fair  grooming and hygiene   Behavior/Motor: Suspicious upon approach, psychomotor agitation  Attitude toward examiner: Uncooperative, dismissive, poor eye contact  Speech: Short, rapid, irritable tone  Mood:  \"Get me out of here\"  Affect: Irritable, evasive, angry  Thought processes: Linear and coherent though does not engage in any lengthy conversation  Thought content: Discharge focused, refusing all treatment, no targeted aggression towards staff  Suicidal Ideation: Engaging in self-injurious behavior by hitting himself in the head, moved to a room visible to nursing from nurses station, may require line of sight or one-to-one sitter if behavior continues, at present in behavioral control  Delusions: No evidence of delusions. Perceptual Disturbance: Staff reports that he has been attending to internal stimulation as a result hitting himself in the head  Cognition: Oriented to self, location, time, and situation  Memory: intact  Insight: poor   Judgement: poor       Data   height is 5' 6\" (1.676 m) and weight is 245 lb (111.1 kg). His temperature is 97.9 °F (36.6 °C). His blood pressure is 140/88 (abnormal) and his pulse is 101 (abnormal). His respiration is 14.    Labs:   Admission on 06/16/2022, Discharged on 06/17/2022   Component Date Value Ref Range Status    WBC 06/16/2022 12.1* 3.5 - 11.3 k/uL Final    RBC 06/16/2022 4.79  3.95 - 5.11 m/uL Final    Hemoglobin 06/16/2022 14.6  11.9 - 15.1 g/dL Final    Hematocrit 06/16/2022 43.7  36.3 - 47.1 % Final    MCV 06/16/2022 91.2  82.6 - 102.9 fL Final    MCH 06/16/2022 30.5  25.2 - 33.5 pg Final    MCHC 06/16/2022 33.4  28.4 - 34.8 g/dL Final    RDW 06/16/2022 14.0  11.8 - 14.4 % Final    Platelets 92/79/7946 271  138 - 453 k/uL Final    MPV 06/16/2022 10.0  8.1 - 13.5 fL Final    NRBC Automated 06/16/2022 0.0  0.0 per 100 WBC Final    Seg Neutrophils 06/16/2022 78* 36 - 65 % Final    Lymphocytes 06/16/2022 13* 24 - 43 % Final    Monocytes 06/16/2022 8  3 - 12 % Final    Eosinophils % 06/16/2022 1  1 - 4 % Final    Basophils 06/16/2022 0  0 - 2 % Final    Immature Granulocytes 06/16/2022 0  0 % Final    Segs Absolute 06/16/2022 9.28* 1.50 - 8.10 k/uL Final    Absolute Lymph # 06/16/2022 1.58  1.10 - 3.70 k/uL Final    Absolute Mono # 06/16/2022 1.00  0.10 - 1.20 k/uL Final    Absolute Eos # 06/16/2022 0.13  0.00 - 0.44 k/uL Final    Basophils Absolute 06/16/2022 0.05  0.00 - 0.20 k/uL Final    Absolute Immature Granulocyte 06/16/2022 0.04  0.00 - 0.30 k/uL Final    Glucose 06/16/2022 95  70 - 99 mg/dL Final    BUN 06/16/2022 11  6 - 20 mg/dL Final    CREATININE 06/16/2022 0.90  0.50 - 0.90 mg/dL Final    Calcium 06/16/2022 9.1  8.6 - 10.4 mg/dL Final    Sodium 06/16/2022 139  135 - 144 mmol/L Final    Potassium 06/16/2022 4.3  3.7 - 5.3 mmol/L Final    Chloride 06/16/2022 103  98 - 107 mmol/L Final    CO2 06/16/2022 23  20 - 31 mmol/L Final    Anion Gap 06/16/2022 13  9 - 17 mmol/L Final    Alkaline Phosphatase 06/16/2022 117* 35 - 104 U/L Final    ALT 06/16/2022 14  5 - 33 U/L Final    AST 06/16/2022 14  <32 U/L Final    Total Bilirubin 06/16/2022 0.20* 0.3 - 1.2 mg/dL Final    Total Protein 06/16/2022 6.9  6.4 - 8.3 g/dL Final    Albumin 06/16/2022 4.3  3.5 - 5.2 g/dL Final    Albumin/Globulin Ratio 06/16/2022 1.7  1.0 - 2.5 Final    GFR Non- 06/16/2022 >60  >60 mL/min Final    GFR  06/16/2022 >60  >60 mL/min Final    GFR Comment 06/16/2022        Final    Comment: Average GFR for 52-63 years old:   80 mL/min/1.73sq m  Chronic Kidney Disease:   <60 mL/min/1.73sq m  Kidney failure:   <15 mL/min/1.73sq m              eGFR calculated using average adult body mass. Additional eGFR calculator available at:        [employer]            hCG Qual 06/16/2022 NEGATIVE  NEGATIVE Final    Comment: Specimens with hCG levels near the threshold of the test (25 mIU/mL) may give a negative or   indeterminate result. In such cases, another test should be performed with a new specimen   in 48-72 hours. If early pregnancy is suspected clinically in this setting, correlation   with quantitative serum b-hCG level is suggested. [facility] has confirmed the use of plasma for this test. This has not been cleared   or approved by the U.S. Food and Drug Administration. The FDA has determined that such   clearance is not necessary.  Amphetamine Screen, Ur 06/16/2022 NEGATIVE  NEGATIVE Final    Comment:       (Positive cutoff 1000 ng/mL)                  Barbiturate Screen, Ur 06/16/2022 NEGATIVE  NEGATIVE Final    Comment:       (Positive cutoff 200 ng/mL)                  Benzodiazepine Screen, Urine 06/16/2022 NEGATIVE  NEGATIVE Final    Comment:       (Positive cutoff 200 ng/mL)                  Cocaine Metabolite, Urine 06/16/2022 NEGATIVE  NEGATIVE Final    Comment:       (Positive cutoff 300 ng/mL)                  Methadone Screen, Urine 06/16/2022 NEGATIVE  NEGATIVE Final    Comment:       (Positive cutoff 300 ng/mL)                  Opiates, Urine 06/16/2022 NEGATIVE  NEGATIVE Final    Comment:       (Positive cutoff 300 ng/mL)                  Phencyclidine, Urine 06/16/2022 NEGATIVE  NEGATIVE Final    Comment:       (Positive cutoff 25 ng/mL)                  Cannabinoid Scrn, Ur 06/16/2022 NEGATIVE  NEGATIVE Final    Comment:       (Positive cutoff 50 ng/mL)                  Oxycodone Screen, Ur 06/16/2022 NEGATIVE  NEGATIVE Final    Comment:       (Positive cutoff 100 ng/mL)                  Test Information 06/16/2022 Assay provides medical screening only. The absence of expected drug(s) and/or metabolite(s) may indicate diluted or adulterated urine, limitations of testing or timing of collection. Final    Comment: Testing for legal purposes should be confirmed by another method. To request confirmation   of test result, please call the lab within 7 days of sample submission.  Specimen Description 06/16/2022 . NASOPHARYNGEAL SWAB   Final    SARS-CoV-2, Rapid 06/16/2022 Not Detected  Not Detected Final    Comment:       Rapid NAAT:  The specimen is NEGATIVE for SARS-CoV-2, the novel coronavirus associated with   COVID-19. The ID NOW COVID-19 assay is designed to detect the virus that causes COVID-19 in patients   with signs and symptoms of infection who are suspected of COVID-19.   An individual without symptoms of COVID-19 and who is not shedding SARS-CoV-2 virus would   expect to have a negative (not detected) result in this assay. Negative results should be treated as presumptive and, if inconsistent with clinical signs   and symptoms or necessary for patient management,  should be tested with an alternative molecular assay. Negative results do not preclude   SARS-CoV-2 infection and   should not be used as the sole basis for patient management decisions. Fact sheet for Healthcare Providers: Singh  Fact sheet for Patients: Singh          Methodology: Isothermal Nucleic Acid Amplification      Ethanol 06/16/2022 <10  <10 mg/dL Final    Ethanol percent 06/16/2022 <0.010  <0.010 % Final         Reviewed patient's current plan of care and vital signs with nursing staff.     Labs reviewed: [x] Yes    Medications  Current Facility-Administered Medications: acetaminophen (TYLENOL) tablet 650 mg, 650 mg, Oral, Q4H PRN  aluminum & magnesium hydroxide-simethicone (MAALOX) 200-200-20 MG/5ML suspension 30 mL, 30 mL, Oral, Q6H PRN  hydrOXYzine HCl (ATARAX) tablet 50 mg, 50 mg, Oral, TID PRN  ibuprofen (ADVIL;MOTRIN) tablet 400 mg, 400 mg, Oral, Q6H PRN  nicotine (NICODERM CQ) 14 MG/24HR 1 patch, 1 patch, TransDERmal, Daily  polyethylene glycol (GLYCOLAX) packet 17 g, 17 g, Oral, Daily PRN  traZODone (DESYREL) tablet 50 mg, 50 mg, Oral, Nightly PRN  diphenhydrAMINE (BENADRYL) injection 50 mg, 50 mg, IntraMUSCular, Q4H PRN **AND** LORazepam (ATIVAN) injection 2 mg, 2 mg, IntraMUSCular, Q4H PRN  LORazepam (ATIVAN) tablet 2 mg, 2 mg, Oral, Q4H PRN  albuterol sulfate HFA (PROVENTIL;VENTOLIN;PROAIR) 108 (90 Base) MCG/ACT inhaler 2 puff, 2 puff, Inhalation, Q4H PRN  atorvastatin (LIPITOR) tablet 20 mg, 20 mg, Oral, Nightly  benztropine (COGENTIN) tablet 0.5 mg, 0.5 mg, Oral, BID  furosemide (LASIX) tablet 20 mg, 20 mg, Oral, Daily  risperiDONE (RISPERDAL) tablet 1 mg, 1 mg, Oral, BID  sertraline (ZOLOFT) tablet 100 mg, 100 mg, Oral, Daily  lisinopril (PRINIVIL;ZESTRIL) tablet 5 mg, 5 mg, Oral, Daily    ASSESSMENT  Schizoaffective disorder, depressive type ([address]         PLAN  Patient symptoms are: Unstable, he remains a serious risk to himself  Continue current medication regimen, encourage compliance  Monitor need and frequency of PRN medications. Encourage participation in groups and milieu. Attempt to develop insight. Psycho-education conducted. Supportive Therapy conducted. Probable discharge is per attending physician. Follow-up daily while inpatient. Patient continues to be monitored in the inpatient psychiatric facility at [facility] for safety and stabilization. Patient continues to need, on a daily basis, active treatment furnished directly by or requiring the supervision of inpatient psychiatric personnel. Electronically signed by KANCHAN Cabrera CNP on 6/18/2022 at 1:03 PM    **This report has been created using voice recognition software. It may contain minor errors which are inherent in voice recognition technology. **

## 2022-06-18 NOTE — PLAN OF CARE
Problem: Safety - Adult  Goal: Free from fall injury  6/18/2022 1751 by Hitesh Stewart LPN  Outcome: Not Progressing     Problem: Pain  Goal: Verbalizes/displays adequate comfort level or baseline comfort level  6/18/2022 1751 by Hitesh Stewart LPN  Outcome: Not Progressing     Problem: Self Harm/Suicidality  Goal: Will have no self-injury during hospital stay  Description: INTERVENTIONS:  1. Q 30 MINUTES: Routine safety checks  2. Q SHIFT & PRN: Assess risk to determine if routine checks are adequate to maintain patient safety  6/18/2022 1751 by Hitesh Stewart LPN  Outcome: Not Progressing     Problem: Behavior  Goal: Pt/Family maintain appropriate behavior and adhere to behavioral management agreement, if implemented  Description: INTERVENTIONS:  1. Assess patient/family's coping skills and  non-compliant behavior (including use of illegal substances)  2. Notify security of behavior or suspected illegal substances which indicate the need for search of the patient and/or belongings  3. Encourage verbalization of thoughts and concerns in a socially appropriate manner  4. Utilize positive, consistent limit setting strategies supporting safety of patient, staff and others  5. Encourage participation in the decision making process about the behavioral management agreement  6. Implement a Health Care Agreement if patient meets criteria  7. If a patient's behavior jeopardizes the safety of the patient, staff, or others refer to organization policy. If a visitor's behavior poses a threat to safety call refer to organization policy. 8. Initiate consult with , Psychosocial CNS, Spiritual Care as appropriate  6/18/2022 1751 by Hitesh Stewart LPN  Outcome: Not Progressing     Problem: Anxiety  Goal: Will report anxiety at manageable levels  Description: INTERVENTIONS:  1. Administer medication as ordered  2. Teach and rehearse alternative coping skills  3.  Provide emotional support with 1:1 interaction with staff  6/18/2022 1751 by Natan Slade LPN  Outcome: Not Progressing

## 2022-06-18 NOTE — PROGRESS NOTES
Emergency PRN Medication Administration Note:      Patient is tearful and agitated with increased anxiety as evidence of attempting to remove stitches from left upper extremity and slapping right side of head continuously. Staff attempted to find and relieve the distress by offering 1:1 talk time, quiet mileu and medication. Patient is currently regaining behavioral control and was accepting of PRN medications as SOS team continued to provide emotional support and encouragement. Medication Administered as prescribed Benadryl 50mg IM and Ativan 2mg IM. Patient tolerated medication administration. Will continue to monitor, offer support, and reassess.

## 2022-06-18 NOTE — GROUP NOTE
Group Therapy Note    Date: 6/18/2022    Group Start Time: 1330  Group End Time: 6964  Group Topic: Cognitive Skills    MATT BHFREDDIE Walker, [address]        Group Therapy Note    Attendees: 6/11         Pt did not participate in Cognitive Skills Group at 1330 when encouraged by RT due to resting in room. Pt was offered talk time as an alternative to group but declined.          Discipline Responsible: Psychoeducational Specialist        Signature:  Clotilde Rasmussen

## 2022-06-18 NOTE — PROGRESS NOTES
Emergency Medication Follow-Up Note:    PRN medication of Benadryl 50mg and Ativan 2mg IM was effective as evidence of patient regaining behavioral control, absence of behavior warranting emergency medication, socializing with peers. Patient denies medication side effects. Will continue to monitor and provide support as needed.

## 2022-06-18 NOTE — PROGRESS NOTES
Patient refused morning vitals, his assessment and medication times 2 with attempts to educate and encourage. Patient denies signs/symptoms of headache, blurred vision, dizziness or difficulty walking ad exhibits no signs.

## 2022-06-18 NOTE — PROGRESS NOTES
Stated he wanted to tear the stitches out. I said I needed a doctors order & asked him not to. He tearfully agreed & did vent briefly about the frustrations of the voices. More receptive to my support/reassurance @ this x.

## 2022-06-18 NOTE — PROGRESS NOTES
Patient refused morning medication at this time with education and encouragement provided. Patient denies any pain, blurred vision, dizziness or blurred vision. Will update pertinent staff.

## 2022-06-18 NOTE — PROGRESS NOTES
Was agreeable to IM PRN Benadryl/Ativan for agitation/voices striking head with hand. Was receptive to support/reassurance.

## 2022-06-18 NOTE — PLAN OF CARE
5 [facility]  Day 3 Interdisciplinary Treatment Plan NOTE    Review Date & Time: 6/18/2022                 907am    Admission Type:   Admission Type: Involuntary    Reason for admission:  Reason for Admission: Suicide attempt today in the form of cutting arms. Patient has command hallucinations to harm self. Patient was found wandering down the street with a bloody arm and [facility] brought pt to [facility].  ED.   Estimated Length of Stay: 5-7 days  Estimated Discharge Date Update: to be determined by physician    PATIENT STRENGTHS:  Patient Strengths    Patient Strengths and Limitations:Limitations: Apathetic / unmotivated,Multiple barriers to leisure interests,General negative or hopeless attitude about future/recovery,Inappropriate/potentially harmful leisure interests,Difficult relationships / poor social skills,Demonstrates discomfort with /lack of social skills,Difficulty problem solving/relies on others to help solve problems  Addictive Behavior:   Medical Problems:  Past Medical History:   Diagnosis Date    Cocaine abuse ([facility] [address]        Risk:  Fall Risk   Renato Scale Renato Scale Score: 22  BVC    Change in scores no Changes to plan of Care no    Status EXAM:   Mental Status and Behavioral Exam  Normal: No  Level of Assistance: Independent/Self  Facial Expression: Worried  Affect: Constricted  Level of Consciousness: Alert  Frequency of Checks: 4 times per hour, close  Mood:Normal: No  Mood: Depressed,Anxious,Labile,Irritable  Motor Activity:Normal: Yes  Eye Contact: Poor  Observed Behavior: Withdrawn,Impulsive,Guarded,Preoccupied  Sexual Misconduct History: Current - no  Preception: Others (comment) (unable to assess; pt not answering orientation questions)  Attention:Normal: No  Attention: Distractible  Thought Processes: Other (comment) (concrete)  Thought Content:Normal: No  Thought Content: Preoccupations  Depression Symptoms: Feelings of helplessness,Feelings of hopelessess,Feelings of worthlessness,Increased irritability,Isolative,Loss of interest  Anxiety Symptoms: Generalized  Shirley Symptoms: No problems reported or observed. Hallucinations: Auditory (comment)  Delusions: No  Memory:Normal: Yes  Memory: Poor recent  Insight and Judgment: No  Insight and Judgment: Poor judgment,Poor insight,Unmotivated    Daily Assessment Last Entry:   Daily Sleep (WDL): Within Defined Limits            Daily Nutrition (WDL): Exceptions to WDL (patient not awake for meals)  Barriers to Nutrition: None  Level of Assistance: Independent/Self    Patient Monitoring:  Frequency of Checks: 4 times per hour, close    Psychiatric Symptoms:   Depression Symptoms  Depression Symptoms: Feelings of helplessness,Feelings of hopelessess,Feelings of worthlessness,Increased irritability,Isolative,Loss of interest  Anxiety Symptoms  Anxiety Symptoms: Generalized  Shirley Symptoms  Shirley Symptoms: No problems reported or observed.           Suicide Risk CSSR-S:  1) Within the past month, have you wished you were dead or wished you could go to sleep and not wake up? : Yes (pt declined to elaborate)  2) Have you actually had any thoughts of killing yourself? : Yes (pt declined to elaborate)  6) Have you ever done anything, started to do anything, or prepared to do anything to end your life?: Yes (pt declined to elaborate)  Change in Result             no                       Change in Plan of care          no      EDUCATION:   EDUCATION:   Learner Progress Toward Treatment Goals: Reviewed results and recommendations of this team, Reviewed group plan and strategies, Reviewed signs, symptoms and risk of self harm and violent behavior, Reviewed goals and plan of care    Method:small group, individual verbal education    Outcome:verbalized by patient, but needs reinforcement to obtain goals    PATIENT GOALS:  Short term: Pt refused to meet with team,pt did not develop a short term goal.  Long term: Pt did not develop a long term goal.    PLAN/TREATMENT RECOMMENDATIONS UPDATE: continue with group therapies, increased socialization, continue planning for after discharge goals, continue with medication compliance    SHORT-TERM GOALS UPDATE:   Time frame for Short-Term Goals: 5-7 days    LONG-TERM GOALS UPDATE:   Time frame for Long-Term Goals: 6 months  Members Present in Team Meeting: See Signature Sheet    Clotilde Rasmussen

## 2022-06-19 PROCEDURE — 1240000000 HC EMOTIONAL WELLNESS R&B

## 2022-06-19 PROCEDURE — 99232 SBSQ HOSP IP/OBS MODERATE 35: CPT

## 2022-06-19 NOTE — PROGRESS NOTES
Daily Progress Note  6/19/2022    Patient Name: Martín Glover: Depression with suicide attempt         SUBJECTIVE:      Patient is seen today for a follow up assessment. Patient has not been compliant with any of his scheduled medication at this time. Patient has required multiple sets of emergency medication related to self damaging behavior of banging head against the wall and not remaining in behavioral control. Approach for interview patient presents with poor insight into reasons for medication administration and the continues to state that he will not take any of the medication. Patient continues to endorse thoughts about wanting to make self bleed to reduce frustration. Patient currently endorses suicidal ideation stating he will jump off a bridge next chance he gets in the community several \"nobody has to worry about him anymore\". Patient is endorsing command auditory hallucinations to kill himself and leave the hospital.  Patient is denying visual hallucinations. Patient endorses paranoia that the food is poisoned and has not eaten any of tray when approached for interview. Patient reports paranoia in the community that he was getting poisoned as well. Patient is not appropriate for a lower level of care at this time.     Appetite:  [] Adequate/Unchanged  [] Increased  [x] Decreased      Sleep:       [x] Adequate/Unchanged  [] Fair  [] Poor      Group Attendance on Unit:   [] Yes   [] Selectively    [x] No    M patient edication Side Effects: Refusing all medications         Mental Status Exam  Level of consciousness: Alert and awake   Appearance: Appropriate attire for setting, resting in bed, with fair  grooming and hygiene   Behavior/Motor: Suspicious upon approach, psychomotor agitation  Attitude toward examiner: Uncooperative, dismissive, poor eye contact  Speech: Short, rapid, irritable tone  Mood: \"I do not know\"  Affect: Irritable, evasive, angry  Thought processes: Linear and coherent though does not engage in any lengthy conversation  Thought content: Discharge focused, refusing all treatment, no targeted aggression towards staff  Suicidal Ideation: Engaging in self-injurious behavior by hitting himself in the head, moved to a room visible to nursing from nurses station, may require line of sight or one-to-one sitter if behavior continues, at present in behavioral control  Delusions: No evidence of delusions. Perceptual Disturbance: Staff reports that he has been attending to internal stimulation as a result hitting himself in the head  Cognition: Oriented to self, location, time, and situation  Memory: intact  Insight: poor   Judgement: poor       Data   height is 5' 6\" (1.676 m) and weight is 245 lb (111.1 kg). His temperature is 97.9 °F (36.6 °C). His blood pressure is 140/88 (abnormal) and his pulse is 101 (abnormal). His respiration is 14. Labs:   No visits with results within 2 Day(s) from this visit.    Latest known visit with results is:   Admission on 06/16/2022, Discharged on 06/17/2022   Component Date Value Ref Range Status    WBC 06/16/2022 12.1* 3.5 - 11.3 k/uL Final    RBC 06/16/2022 4.79  3.95 - 5.11 m/uL Final    Hemoglobin 06/16/2022 14.6  11.9 - 15.1 g/dL Final    Hematocrit 06/16/2022 43.7  36.3 - 47.1 % Final    MCV 06/16/2022 91.2  82.6 - 102.9 fL Final    MCH 06/16/2022 30.5  25.2 - 33.5 pg Final    MCHC 06/16/2022 33.4  28.4 - 34.8 g/dL Final    RDW 06/16/2022 14.0  11.8 - 14.4 % Final    Platelets 98/50/2045 271  138 - 453 k/uL Final    MPV 06/16/2022 10.0  8.1 - 13.5 fL Final    NRBC Automated 06/16/2022 0.0  0.0 per 100 WBC Final    Seg Neutrophils 06/16/2022 78* 36 - 65 % Final    Lymphocytes 06/16/2022 13* 24 - 43 % Final    Monocytes 06/16/2022 8  3 - 12 % Final    Eosinophils % 06/16/2022 1  1 - 4 % Final    Basophils 06/16/2022 0  0 - 2 % Final    Immature Granulocytes 06/16/2022 0  0 % Final    Segs Absolute 06/16/2022 9.28* 1.50 - 8.10 k/uL Final    Absolute Lymph # 06/16/2022 1.58  1.10 - 3.70 k/uL Final    Absolute Mono # 06/16/2022 1.00  0.10 - 1.20 k/uL Final    Absolute Eos # 06/16/2022 0.13  0.00 - 0.44 k/uL Final    Basophils Absolute 06/16/2022 0.05  0.00 - 0.20 k/uL Final    Absolute Immature Granulocyte 06/16/2022 0.04  0.00 - 0.30 k/uL Final    Glucose 06/16/2022 95  70 - 99 mg/dL Final    BUN 06/16/2022 11  6 - 20 mg/dL Final    CREATININE 06/16/2022 0.90  0.50 - 0.90 mg/dL Final    Calcium 06/16/2022 9.1  8.6 - 10.4 mg/dL Final    Sodium 06/16/2022 139  135 - 144 mmol/L Final    Potassium 06/16/2022 4.3  3.7 - 5.3 mmol/L Final    Chloride 06/16/2022 103  98 - 107 mmol/L Final    CO2 06/16/2022 23  20 - 31 mmol/L Final    Anion Gap 06/16/2022 13  9 - 17 mmol/L Final    Alkaline Phosphatase 06/16/2022 117* 35 - 104 U/L Final    ALT 06/16/2022 14  5 - 33 U/L Final    AST 06/16/2022 14  <32 U/L Final    Total Bilirubin 06/16/2022 0.20* 0.3 - 1.2 mg/dL Final    Total Protein 06/16/2022 6.9  6.4 - 8.3 g/dL Final    Albumin 06/16/2022 4.3  3.5 - 5.2 g/dL Final    Albumin/Globulin Ratio 06/16/2022 1.7  1.0 - 2.5 Final    GFR Non- 06/16/2022 >60  >60 mL/min Final    GFR  06/16/2022 >60  >60 mL/min Final    GFR Comment 06/16/2022        Final    Comment: Average GFR for 52-63 years old:   80 mL/min/1.73sq m  Chronic Kidney Disease:   <60 mL/min/1.73sq m  Kidney failure:   <15 mL/min/1.73sq m              eGFR calculated using average adult body mass. Additional eGFR calculator available at:        [employer]            hCG Qual 06/16/2022 NEGATIVE  NEGATIVE Final    Comment: Specimens with hCG levels near the threshold of the test (25 mIU/mL) may give a negative or   indeterminate result. In such cases, another test should be performed with a new specimen   in 48-72 hours.   If early pregnancy is suspected clinically in this setting, correlation   with quantitative serum b-hCG level is suggested. [facility] has confirmed the use of plasma for this test. This has not been cleared   or approved by the U.S. Food and Drug Administration. The FDA has determined that such   clearance is not necessary.  Amphetamine Screen, Ur 06/16/2022 NEGATIVE  NEGATIVE Final    Comment:       (Positive cutoff 1000 ng/mL)                  Barbiturate Screen, Ur 06/16/2022 NEGATIVE  NEGATIVE Final    Comment:       (Positive cutoff 200 ng/mL)                  Benzodiazepine Screen, Urine 06/16/2022 NEGATIVE  NEGATIVE Final    Comment:       (Positive cutoff 200 ng/mL)                  Cocaine Metabolite, Urine 06/16/2022 NEGATIVE  NEGATIVE Final    Comment:       (Positive cutoff 300 ng/mL)                  Methadone Screen, Urine 06/16/2022 NEGATIVE  NEGATIVE Final    Comment:       (Positive cutoff 300 ng/mL)                  Opiates, Urine 06/16/2022 NEGATIVE  NEGATIVE Final    Comment:       (Positive cutoff 300 ng/mL)                  Phencyclidine, Urine 06/16/2022 NEGATIVE  NEGATIVE Final    Comment:       (Positive cutoff 25 ng/mL)                  Cannabinoid Scrn, Ur 06/16/2022 NEGATIVE  NEGATIVE Final    Comment:       (Positive cutoff 50 ng/mL)                  Oxycodone Screen, Ur 06/16/2022 NEGATIVE  NEGATIVE Final    Comment:       (Positive cutoff 100 ng/mL)                  Test Information 06/16/2022 Assay provides medical screening only. The absence of expected drug(s) and/or metabolite(s) may indicate diluted or adulterated urine, limitations of testing or timing of collection. Final    Comment: Testing for legal purposes should be confirmed by another method. To request confirmation   of test result, please call the lab within 7 days of sample submission.  Specimen Description 06/16/2022 . NASOPHARYNGEAL SWAB   Final    SARS-CoV-2, Rapid 06/16/2022 Not Detected  Not Detected Final    Comment:       Rapid NAAT:  The specimen is NEGATIVE for SARS-CoV-2, the novel coronavirus associated with   COVID-19. The ID NOW COVID-19 assay is designed to detect the virus that causes COVID-19 in patients   with signs and symptoms of infection who are suspected of COVID-19. An individual without symptoms of COVID-19 and who is not shedding SARS-CoV-2 virus would   expect to have a negative (not detected) result in this assay. Negative results should be treated as presumptive and, if inconsistent with clinical signs   and symptoms or necessary for patient management,  should be tested with an alternative molecular assay. Negative results do not preclude   SARS-CoV-2 infection and   should not be used as the sole basis for patient management decisions. Fact sheet for Healthcare Providers: Saida[employer]rupa  Fact sheet for Patients: Singh          Methodology: Isothermal Nucleic Acid Amplification      Ethanol 06/16/2022 <10  <10 mg/dL Final    Ethanol percent 06/16/2022 <0.010  <0.010 % Final         Reviewed patient's current plan of care and vital signs with nursing staff.     Labs reviewed: [x] Yes    Medications  Current Facility-Administered Medications: acetaminophen (TYLENOL) tablet 650 mg, 650 mg, Oral, Q4H PRN  aluminum & magnesium hydroxide-simethicone (MAALOX) 200-200-20 MG/5ML suspension 30 mL, 30 mL, Oral, Q6H PRN  hydrOXYzine HCl (ATARAX) tablet 50 mg, 50 mg, Oral, TID PRN  ibuprofen (ADVIL;MOTRIN) tablet 400 mg, 400 mg, Oral, Q6H PRN  nicotine (NICODERM CQ) 14 MG/24HR 1 patch, 1 patch, TransDERmal, Daily  polyethylene glycol (GLYCOLAX) packet 17 g, 17 g, Oral, Daily PRN  traZODone (DESYREL) tablet 50 mg, 50 mg, Oral, Nightly PRN  diphenhydrAMINE (BENADRYL) injection 50 mg, 50 mg, IntraMUSCular, Q4H PRN **AND** LORazepam (ATIVAN) injection 2 mg, 2 mg, IntraMUSCular, Q4H PRN  LORazepam (ATIVAN) tablet 2 mg, 2 mg, Oral, Q4H PRN  albuterol sulfate HFA (PROVENTIL;VENTOLIN;PROAIR) 108 (90 Base) MCG/ACT inhaler 2 puff, 2 puff, Inhalation, Q4H PRN  atorvastatin (LIPITOR) tablet 20 mg, 20 mg, Oral, Nightly  benztropine (COGENTIN) tablet 0.5 mg, 0.5 mg, Oral, BID  furosemide (LASIX) tablet 20 mg, 20 mg, Oral, Daily  risperiDONE (RISPERDAL) tablet 1 mg, 1 mg, Oral, BID  sertraline (ZOLOFT) tablet 100 mg, 100 mg, Oral, Daily  lisinopril (PRINIVIL;ZESTRIL) tablet 5 mg, 5 mg, Oral, Daily    ASSESSMENT  Schizoaffective disorder, [facility]         PLAN  Patient symptoms are: Unstable, he remains a serious risk to himself  Continue current medication regimen, encourage compliance  Monitor need and frequency of PRN medications. Encourage participation in groups and milieu. Attempt to develop insight. Psycho-education conducted. Supportive Therapy conducted. Probable discharge is per attending physician. Follow-up daily while inpatient. Patient continues to be monitored in the inpatient psychiatric facility at [facility] for safety and stabilization. Patient continues to need, on a daily basis, active treatment furnished directly by or requiring the supervision of inpatient psychiatric personnel. Electronically signed by KANCHAN Boateng CNP on 6/19/2022 at 6:55 PM    **This report has been created using voice recognition software. It may contain minor errors which are inherent in voice recognition technology. **

## 2022-06-19 NOTE — PLAN OF CARE
Problem: Safety - Adult  Goal: Free from fall injury  6/19/2022 1305 by Tino Dozier LPN  Outcome: Not Progressing  Note: Patient reported having auditory hallucinations and the urge to probe at her left forearm lacerations redirecting unsuccessful. Patient has refused her medications this morning despite any education or encouragement provided. He isolates to room , is aloof among peers, thought blocked with general anxiety. Patient has refused all assessments and deny pain, blurred vision or dizziness. She is ambulatory and speech has no deficits. Problem: Pain  Goal: Verbalizes/displays adequate comfort level or baseline comfort level  6/19/2022 1305 by Tino Dozier LPN  Outcome: Not Progressing  6/19/2022 1247 by Tino Dozier LPN  Outcome: Not Progressing     Problem: Self Harm/Suicidality  Goal: Will have no self-injury during hospital stay  Description: INTERVENTIONS:  1. Q 30 MINUTES: Routine safety checks  2. Q SHIFT & PRN: Assess risk to determine if routine checks are adequate to maintain patient safety  6/19/2022 1305 by Tino Dozier LPN  Outcome: Not Progressing     Problem: Self Harm/Suicidality  Goal: Will have no self-injury during hospital stay  Description: INTERVENTIONS:  1. Q 30 MINUTES: Routine safety checks  2. Q SHIFT & PRN: Assess risk to determine if routine checks are adequate to maintain patient safety  6/19/2022 1247 by Tino Dozier LPN  Outcome: Not Progressing     Problem: Behavior  Goal: Pt/Family maintain appropriate behavior and adhere to behavioral management agreement, if implemented  Description: INTERVENTIONS:  1. Assess patient/family's coping skills and  non-compliant behavior (including use of illegal substances)  2. Notify security of behavior or suspected illegal substances which indicate the need for search of the patient and/or belongings  3. Encourage verbalization of thoughts and concerns in a socially appropriate manner  4.  Utilize positive, consistent limit setting strategies supporting safety of patient, staff and others  5. Encourage participation in the decision making process about the behavioral management agreement  6. Implement a Health Care Agreement if patient meets criteria  7. If a patient's behavior jeopardizes the safety of the patient, staff, or others refer to organization policy. If a visitor's behavior poses a threat to safety call refer to organization policy. 8. Initiate consult with , Psychosocial CNS, Spiritual Care as appropriate  6/19/2022 1305 by Corey Doss LPN  Outcome: Not Progressing  6/19/2022 1247 by Corey Doss LPN  Outcome: Not Progressing     Problem: Anxiety  Goal: Will report anxiety at manageable levels  Description: INTERVENTIONS:  1. Administer medication as ordered  2. Teach and rehearse alternative coping skills  3. Provide emotional support with 1:1 interaction with staff  6/19/2022 1305 by Corey Doss LPN  Outcome: Not Progressing  6/19/2022 1247 by Corey Doss LPN  Outcome: Not Progressing  Note: Patient denies all but reports auditory hallucinations and anxiety. She is non compliant with her assessments, medications and vitals. Patient is irritable, isolative and free from falls. Programming continues to be encouraged.

## 2022-06-19 NOTE — BH NOTE
Patient has been observed several times standing at the doors exit seeking. Patient has been given 1:1 talk time and explained that this is not acceptable behaviors. Patient states, \"I am going to stand here until someone comes in and then I will leave\". RN again stressed the need to demonstrate controlled behaviors; otherwise PRN medications may be indicated. Patient became irritable and states, \"I will just go to my room\". Will continue to monitor patient's behaviors.

## 2022-06-19 NOTE — PROGRESS NOTES
Emergency Medication Follow-Up Note:    PRN medication was effective as evidence by patient stopped slapping his head. Patient denies medication side effects. Will continue to monitor and provide support as needed.

## 2022-06-19 NOTE — BH NOTE
Patient continues to pick at sutures above left wrist removing them one at a time. RN educated patient on the need to let the healing process continue and patient states, \"I don't care! \". Patient refused further assessment but RN could see very slight bleeding. Patient has left wrist/forearm wound open to air.

## 2022-06-19 NOTE — PROGRESS NOTES
Emergency PRN Medication Administration Note:      Patient is agitated as evidence by rocking in bed and hitting his head with open hand. Staff attempted to find and relieve the distress by Talking to patient, redirection, offering food. Patient is currently   accepting PRN medications. Medication Administered as prescribed: ativan, 2 mg, and benadryl, 50 mg, intramuscular. Patient Tolerated medication administration. Will continue to monitor, offer support, and reassess.

## 2022-06-19 NOTE — PLAN OF CARE
Problem: Safety - Adult  Goal: Free from fall injury  Outcome: Not Progressing     Problem: Pain  Goal: Verbalizes/displays adequate comfort level or baseline comfort level  Outcome: Not Progressing     Problem: Self Harm/Suicidality  Goal: Will have no self-injury during hospital stay  Description: INTERVENTIONS:  1. Q 30 MINUTES: Routine safety checks  2. Q SHIFT & PRN: Assess risk to determine if routine checks are adequate to maintain patient safety  Outcome: Not Progressing     Problem: Behavior  Goal: Pt/Family maintain appropriate behavior and adhere to behavioral management agreement, if implemented  Description: INTERVENTIONS:  1. Assess patient/family's coping skills and  non-compliant behavior (including use of illegal substances)  2. Notify security of behavior or suspected illegal substances which indicate the need for search of the patient and/or belongings  3. Encourage verbalization of thoughts and concerns in a socially appropriate manner  4. Utilize positive, consistent limit setting strategies supporting safety of patient, staff and others  5. Encourage participation in the decision making process about the behavioral management agreement  6. Implement a Health Care Agreement if patient meets criteria  7. If a patient's behavior jeopardizes the safety of the patient, staff, or others refer to organization policy. If a visitor's behavior poses a threat to safety call refer to organization policy. 8. Initiate consult with , Psychosocial CNS, Spiritual Care as appropriate  Outcome: Not Progressing     Problem: Anxiety  Goal: Will report anxiety at manageable levels  Description: INTERVENTIONS:  1. Administer medication as ordered  2. Teach and rehearse alternative coping skills  3. Provide emotional support with 1:1 interaction with staff  Outcome: Not Progressing  Note: Patient denies all but reports auditory hallucinations and anxiety.  She is non compliant with her assessments, medications and vitals. Patient is irritable, isolative and free from falls. Programming continues to be encouraged.

## 2022-06-19 NOTE — PROGRESS NOTES
Patient had a changed of mind and refused her medications with education provided. Stating,\"I'm not taking any meds\".

## 2022-06-20 VITALS
TEMPERATURE: 97.9 F | BODY MASS INDEX: 39.37 KG/M2 | DIASTOLIC BLOOD PRESSURE: 88 MMHG | HEIGHT: 66 IN | RESPIRATION RATE: 14 BRPM | WEIGHT: 245 LBS | HEART RATE: 101 BPM | SYSTOLIC BLOOD PRESSURE: 140 MMHG

## 2022-06-20 PROCEDURE — 99239 HOSP IP/OBS DSCHRG MGMT >30: CPT | Performed by: PSYCHIATRY & NEUROLOGY

## 2022-06-20 PROCEDURE — 6360000002 HC RX W HCPCS: Performed by: PSYCHIATRY & NEUROLOGY

## 2022-06-20 RX ORDER — LISINOPRIL 5 MG/1
5 TABLET ORAL DAILY
Qty: 30 TABLET | Refills: 3 | Status: SHIPPED | OUTPATIENT
Start: 2022-06-21 | End: 2022-09-20

## 2022-06-20 RX ADMIN — LORAZEPAM 2 MG: 2 INJECTION INTRAMUSCULAR; INTRAVENOUS at 03:31

## 2022-06-20 RX ADMIN — DIPHENHYDRAMINE HYDROCHLORIDE 50 MG: 50 INJECTION, SOLUTION INTRAMUSCULAR; INTRAVENOUS at 03:31
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
## 2022-06-20 NOTE — BH NOTE
Patient refused morning vitals and medications. Encouragement and education was provided by Shannon Perkins.

## 2022-06-20 NOTE — GROUP NOTE
Group Therapy Note    Date: 6/20/2022    Group Start Time: 1100  Group End Time: 3717  Group Topic: Psychoeducation    [facility]    Pt did not attend 1100 psychoeducation group d/t resting in room despite staff invitation to attend. 1:1 talk time offered as alternative to group session, pt declined.          Signature:  Leigh Leslie

## 2022-06-20 NOTE — DISCHARGE INSTR - DIET

## 2022-06-20 NOTE — CARE COORDINATION
1:1  - Writer speaks with Pt prior to discharge. Pt is agreeable to go back to [facility]. Writer calls and speaks with Tawanna Ramos and she is able to come Tuesday, 6/21 after his appt.  With [facility]

## 2022-06-20 NOTE — BH NOTE
[id number] [facility]  Discharge Note    Pt discharged with followings belongings:       Valuables sent home with patient. Patient education on aftercare instructions: Yes  Information faxed to [facility] by RN  at 1:35 PM .Patient verbalize understanding of AVS:  Yes. Status EXAM upon discharge:  Mental Status and Behavioral Exam  Normal: No  Level of Assistance: Independent/Self  Facial Expression: Flat,Exaggerated  Affect: Unstable  Level of Consciousness: Alert  Frequency of Checks: 4 times per hour, close  Mood:Normal: No  Mood: Depressed,Anxious  Motor Activity:Normal: No  Motor Activity: Unusual posture/gait  Eye Contact: Fair  Observed Behavior: Withdrawn,Preoccupied  Sexual Misconduct History: Current - no  Preception: [place] to person,[place] to time,[place] to place  Attention:Normal: No  Attention: Distractible  Thought Processes: Blocking  Thought Content:Normal: No  Thought Content: Obsessions,Preoccupations  Depression Symptoms: Feelings of hopelessess,Isolative,Loss of interest  Anxiety Symptoms: Generalized  Shirley Symptoms: No problems reported or observed. Hallucinations: Auditory (comment),Command (comment) (hurt self)  Delusions: No  Memory:Normal: Yes  Memory: Poor recent  Insight and Judgment: No  Insight and Judgment: Poor judgment,Poor insight      Metabolic Screening:    No results found for: LABA1C    No results found for: CHOL  No results found for: TRIG  No results found for: HDL  No components found for: LDLCAL  No results found for: LABVLDL    Patient was discharged from unit at 1330. Patient was ambulatory with cane. Patient was picked up by Ishmael Coker and Michael, approved by social work. Patient is being sent to [facility]. Patient belongings and paperwork were verified and sent with patient.      Bryan Ramírez, MARLENI

## 2022-06-20 NOTE — PROGRESS NOTES
Lab Results   Component Value Date     06/16/2022    K 4.3 06/16/2022     06/16/2022    CO2 23 06/16/2022    BUN 11 06/16/2022    CREATININE 0.90 06/16/2022    GLUCOSE 95 06/16/2022      LIVER PROFILE:  Lab Results   Component Value Date    ALT 14 06/16/2022    AST 14 06/16/2022    PROT 6.9 06/16/2022    BILITOT 0.20 06/16/2022    LABALBU 4.3 06/16/2022          BNP: No results found for: BNP  Lipids: No results found for: CHOL, HDL  INR: No results found for: INR  Last 3 CK, CKMB, Troponin: @LABRCNT(CKTOTAL:3,CKMB:3,TROPONINI:3)       Radiology:    Medications: Allergies:      Current Meds:   Scheduled Meds:    nicotine  1 patch TransDERmal Daily    atorvastatin  20 mg Oral Nightly    benztropine  0.5 mg Oral BID    furosemide  20 mg Oral Daily    risperiDONE  1 mg Oral BID    sertraline  100 mg Oral Daily    lisinopril  5 mg Oral Daily     Continuous Infusions:   PRN Meds: acetaminophen, aluminum & magnesium hydroxide-simethicone, hydrOXYzine HCl, ibuprofen, polyethylene glycol, traZODone, diphenhydrAMINE **AND** LORazepam, LORazepam, albuterol sulfate HFA      Physical Examination:        BP (!) 140/88   Pulse (!) 101   Temp 97.9 °F (36.6 °C)   Resp 14   Ht 5' 6\" (1.676 m)   Wt 245 lb (111.1 kg)   BMI 39.54 kg/m²   No data recorded. No results for input(s): POCGLU in the last 72 hours. No intake or output data in the 24 hours ending 06/20/22 1202    General Appearance:  alert, well appearing, and in no acute distress  Mental status:   Head:  normocephalic, atraumatic.   Eye: no icterus, redness, pupils equal and reactive, extraocular eye movements intact, conjunctiva clear  Ear: normal external ear, no discharge, hearing intact  Nose:  no drainage noted  Mouth: mucous membranes moist  Neck: supple, no carotid bruits, thyroid not palpable  Lungs: Bilateral equal air entry, clear to ausculation, no wheezing, rales or rhonchi, normal effort  Cardiovascular: normal rate, regular rhythm, no murmur, gallop, rub. Abdomen: Soft, nontender, nondistended, normal bowel sounds, no hepatomegaly or splenomegaly  Neurologic: There are no new focal motor or sensory deficits,   Skin: No gross lesions, rashes, bruising or bleeding on exposed skin area  Extremities:  peripheral pulses palpable, no pedal edema or calf pain with palpation  Psych:             Assessment:        Primary Problem  Schizoaffective disorder, depressive type (HCC)    Principal Problem:    Schizoaffective disorder, depressive type ([address]  Active Problems: Morbid obesity ([address]    Alcoholism ([address]    Mixed hyperlipidemia    Essential hypertension    Laceration of left hand without foreign body    Depression with suicidal ideation  Resolved Problems:    * No resolved hospital problems. *       Plan:          6/20/22    BP Readings from Last 3 Encounters:   06/16/22 122/77   06/14/22 (!) 104/50   06/10/22 (!) 147/88 ·   labs ok   ·      Will sign off . Thanks . Please call again , if neeeded . [facility] Problems           Last Modified POA    * (Principal) Schizoaffective disorder, depressive type ([address] 6/17/2022 Yes    Morbid obesity ([address] 6/17/2022 Yes    Alcoholism ([address] 6/17/2022 Yes    Mixed hyperlipidemia 6/17/2022 Yes    Essential hypertension 6/17/2022 Yes    Laceration of left hand without foreign body 6/17/2022 Yes    Depression with suicidal ideation 6/17/2022 Yes                         Thanks for consulting us . Will monitor vitals and clinical course , and  Optimize therapy  as needed .            Kodak Seay MD

## 2022-06-20 NOTE — PROGRESS NOTES
Agitated, rocking in bed slapping head. Requests IM PRN's stating he can't take it anymore. Continue support/reassurance.

## 2022-06-20 NOTE — BH NOTE
Patient given tobacco quitline number 38073197233 at this time, refusing to call at this time, states \" I just dont want to quit now\"- patient given information as to the dangers of long term tobacco use. Continue to reinforce the importance of tobacco cessation.

## 2022-06-20 NOTE — PLAN OF CARE
Problem: Safety - Adult  Goal: Free from fall injury  6/19/2022 2022 by Michael Gibbs RN  Outcome: Progressing   Gait is slow but steady. No falls noted. Problem: Pain  Goal: Verbalizes/displays adequate comfort level or baseline comfort level  6/19/2022 2022 by Michael Gibbs RN  Outcome: Progressing   Reports discomfort where he pulled stitches stating he enjoys it. Problem: Self Harm/Suicidality  Goal: Will have no self-injury during hospital stay  Description: INTERVENTIONS:  1. Q 30 MINUTES: Routine safety checks  2. Q SHIFT & PRN: Assess risk to determine if routine checks are adequate to maintain patient safety  6/19/2022 2022 by Michael Gibbs RN  Outcome: Progressing   Denies harming self but wants to see blood. Problem: Behavior  Goal: Pt/Family maintain appropriate behavior and adhere to behavioral management agreement, if implemented  Description: INTERVENTIONS:  1. Assess patient/family's coping skills and  non-compliant behavior (including use of illegal substances)  2. Notify security of behavior or suspected illegal substances which indicate the need for search of the patient and/or belongings  3. Encourage verbalization of thoughts and concerns in a socially appropriate manner  4. Utilize positive, consistent limit setting strategies supporting safety of patient, staff and others  5. Encourage participation in the decision making process about the behavioral management agreement  6. Implement a Health Care Agreement if patient meets criteria  7. If a patient's behavior jeopardizes the safety of the patient, staff, or others refer to organization policy. If a visitor's behavior poses a threat to safety call refer to organization policy. 8. Initiate consult with , Psychosocial CNS, Spiritual Care as appropriate  6/19/2022 2022 by Michael Gibbs RN  Outcome: Progressing   Did stop picking @ wound when I requested that.   Problem: Anxiety  Goal: Will report anxiety at manageable levels  Description: INTERVENTIONS:  1. Administer medication as ordered  2. Teach and rehearse alternative coping skills  3. Provide emotional support with 1:1 interaction with staff  6/19/2022 2022 by Jesus Kolb RN  Outcome: Progressing   C/o to be anxious but declines offer of PRN med's.

## 2022-06-20 NOTE — CARE COORDINATION
1: [id number] [facility] AND SAFETY PLAN:   Writer meets with client regarding discharge and safety planning information and to make sure client is aware of community resources, personalized list of people and social settings that can provide distraction and support, internal coping strategies, and warning signs that might signal a crisis. Writer also provides the client with a folder to include ideas on mental health community support systems, free activities and ideas to build new social support systems, and mental health diagnosis online and Facebook resources, and local and national help lines for another option to call if in a crisis. Client is future focused and is planning to call his aunt and have her send money via [employer] and looking forward to getting a hotel room. Client states has set short- and long-term goals and making steps while here at the hospital.  PT denies current thoughts or plans of suicide, no feelings of hopelessness, and plans to attend follow-up appointment and stay on medications. Client is looking forward to going to his doctor's appointment on Tuesday, June 21st at 10:00 am.    Client and SW review the different AOD treatment facilities that have walk-in appointments. SW sent paperwork to [facility] and then client decided he did not want to go there since it is non-smoking.

## 2022-06-20 NOTE — PROGRESS NOTES
Pulling @ stitches which he has been doing t/o day & MD is aware. Patient states he wants to see blood. Offered scheduled med's as well as PRN's which he declined right now. Educated on need to prevent any active self harm with restrictive measures if necessary. Support/reassurance given.

## 2022-06-20 NOTE — DISCHARGE INSTR - OTHER ORDERS
Follow up with [facility]    Take all medications as directed    Do not do any illegal drugs or alcohol

## 2022-07-02 ENCOUNTER — HOSPITAL ENCOUNTER (EMERGENCY)
Age: 52
Discharge: OTHER INSTITUTION | End: 2022-07-03
Attending: STUDENT IN AN ORGANIZED HEALTH CARE EDUCATION/TRAINING PROGRAM
Payer: MEDICARE

## 2022-07-02 ENCOUNTER — APPOINTMENT (OUTPATIENT)
Dept: GENERAL RADIOLOGY | Age: 52
End: 2022-07-02
Payer: MEDICARE

## 2022-07-02 DIAGNOSIS — R45.851 SUICIDAL IDEATION: Primary | ICD-10-CM

## 2022-07-02 LAB
ABSOLUTE EOS #: 0.5 K/UL (ref 0–0.4)
ABSOLUTE LYMPH #: 2.8 K/UL (ref 1–4.8)
ABSOLUTE MONO #: 1.1 K/UL (ref 0.1–1.3)
ALBUMIN SERPL-MCNC: 3.8 G/DL (ref 3.5–5.2)
ALP BLD-CCNC: 123 U/L (ref 35–104)
ALT SERPL-CCNC: 14 U/L (ref 5–33)
AMPHETAMINE SCREEN URINE: NEGATIVE
ANION GAP SERPL CALCULATED.3IONS-SCNC: 12 MMOL/L (ref 9–17)
AST SERPL-CCNC: 16 U/L
BACTERIA: ABNORMAL
BARBITURATE SCREEN URINE: NEGATIVE
BASOPHILS # BLD: 1 % (ref 0–2)
BASOPHILS ABSOLUTE: 0.1 K/UL (ref 0–0.2)
BENZODIAZEPINE SCREEN, URINE: NEGATIVE
BILIRUB SERPL-MCNC: 0.33 MG/DL (ref 0.3–1.2)
BILIRUBIN URINE: NEGATIVE
BUN BLDV-MCNC: 10 MG/DL (ref 6–20)
CALCIUM SERPL-MCNC: 8.9 MG/DL (ref 8.6–10.4)
CANNABINOID SCREEN URINE: NEGATIVE
CASTS UA: ABNORMAL /LPF
CHLORIDE BLD-SCNC: 106 MMOL/L (ref 98–107)
CO2: 22 MMOL/L (ref 20–31)
COCAINE METABOLITE, URINE: POSITIVE
COLOR: ABNORMAL
CREAT SERPL-MCNC: 0.8 MG/DL (ref 0.5–0.9)
EOSINOPHILS RELATIVE PERCENT: 4 % (ref 0–4)
EPITHELIAL CELLS UA: ABNORMAL /HPF
ETHANOL PERCENT: <0.01 %
ETHANOL: <10 MG/DL
GFR AFRICAN AMERICAN: >60 ML/MIN
GFR NON-AFRICAN AMERICAN: >60 ML/MIN
GFR SERPL CREATININE-BSD FRML MDRD: ABNORMAL ML/MIN/{1.73_M2}
GLUCOSE BLD-MCNC: 105 MG/DL (ref 70–99)
GLUCOSE URINE: NEGATIVE
HCT VFR BLD CALC: 44.5 % (ref 36–46)
HEMOGLOBIN: 14.9 G/DL (ref 12–16)
INFLUENZA A: NOT DETECTED
INFLUENZA B: NOT DETECTED
KETONES, URINE: ABNORMAL
LEUKOCYTE ESTERASE, URINE: ABNORMAL
LYMPHOCYTES # BLD: 22 % (ref 24–44)
MCH RBC QN AUTO: 30.2 PG (ref 26–34)
MCHC RBC AUTO-ENTMCNC: 33.5 G/DL (ref 31–37)
MCV RBC AUTO: 90.2 FL (ref 80–100)
METHADONE SCREEN, URINE: NEGATIVE
MONOCYTES # BLD: 8 % (ref 1–7)
NITRITE, URINE: NEGATIVE
OPIATES, URINE: NEGATIVE
OXYCODONE SCREEN URINE: NEGATIVE
PDW BLD-RTO: 14.4 % (ref 11.5–14.9)
PH UA: 6 (ref 5–8)
PHENCYCLIDINE, URINE: NEGATIVE
PLATELET # BLD: 268 K/UL (ref 150–450)
PMV BLD AUTO: 7.7 FL (ref 6–12)
POTASSIUM SERPL-SCNC: 3.7 MMOL/L (ref 3.7–5.3)
PROTEIN UA: ABNORMAL
RBC # BLD: 4.94 M/UL (ref 4–5.2)
RBC UA: ABNORMAL /HPF
SARS-COV-2 RNA, RT PCR: NOT DETECTED
SEG NEUTROPHILS: 65 % (ref 36–66)
SEGMENTED NEUTROPHILS ABSOLUTE COUNT: 8.4 K/UL (ref 1.3–9.1)
SODIUM BLD-SCNC: 140 MMOL/L (ref 135–144)
SOURCE: NORMAL
SPECIFIC GRAVITY UA: 1.02 (ref 1–1.03)
SPECIMEN DESCRIPTION: NORMAL
TEST INFORMATION: ABNORMAL
TOTAL PROTEIN: 7 G/DL (ref 6.4–8.3)
TURBIDITY: CLEAR
URINE HGB: NEGATIVE
UROBILINOGEN, URINE: NORMAL
WBC # BLD: 12.7 K/UL (ref 3.5–11)
WBC UA: ABNORMAL /HPF

## 2022-07-02 PROCEDURE — 2580000003 HC RX 258: Performed by: STUDENT IN AN ORGANIZED HEALTH CARE EDUCATION/TRAINING PROGRAM

## 2022-07-02 PROCEDURE — 99285 EMERGENCY DEPT VISIT HI MDM: CPT

## 2022-07-02 PROCEDURE — 71045 X-RAY EXAM CHEST 1 VIEW: CPT

## 2022-07-02 PROCEDURE — 80307 DRUG TEST PRSMV CHEM ANLYZR: CPT

## 2022-07-02 PROCEDURE — G0480 DRUG TEST DEF 1-7 CLASSES: HCPCS

## 2022-07-02 PROCEDURE — 96372 THER/PROPH/DIAG INJ SC/IM: CPT

## 2022-07-02 PROCEDURE — 87636 SARSCOV2 & INF A&B AMP PRB: CPT

## 2022-07-02 PROCEDURE — 74018 RADEX ABDOMEN 1 VIEW: CPT

## 2022-07-02 PROCEDURE — 80053 COMPREHEN METABOLIC PANEL: CPT

## 2022-07-02 PROCEDURE — 85025 COMPLETE CBC W/AUTO DIFF WBC: CPT

## 2022-07-02 PROCEDURE — 6360000002 HC RX W HCPCS: Performed by: STUDENT IN AN ORGANIZED HEALTH CARE EDUCATION/TRAINING PROGRAM

## 2022-07-02 PROCEDURE — 81001 URINALYSIS AUTO W/SCOPE: CPT

## 2022-07-02 RX ORDER — NITROFURANTOIN 25; 75 MG/1; MG/1
100 CAPSULE ORAL ONCE
Status: COMPLETED | OUTPATIENT
Start: 2022-07-02 | End: 2022-07-03

## 2022-07-02 RX ORDER — MIDAZOLAM HYDROCHLORIDE 1 MG/ML
2 INJECTION INTRAMUSCULAR; INTRAVENOUS ONCE
Status: COMPLETED | OUTPATIENT
Start: 2022-07-02 | End: 2022-07-02

## 2022-07-02 RX ADMIN — MIDAZOLAM 2 MG: 1 INJECTION INTRAMUSCULAR; INTRAVENOUS at 20:41

## 2022-07-02 RX ADMIN — ZIPRASIDONE MESYLATE 20 MG: 20 INJECTION, POWDER, LYOPHILIZED, FOR SOLUTION INTRAMUSCULAR at 20:59

## 2022-07-02 ASSESSMENT — ENCOUNTER SYMPTOMS
COUGH: 0
COLOR CHANGE: 0
RHINORRHEA: 0
VOMITING: 0
ABDOMINAL PAIN: 0
FACIAL SWELLING: 0
NAUSEA: 0
PHOTOPHOBIA: 0
DIARRHEA: 0
EYE ITCHING: 0
SHORTNESS OF BREATH: 0

## 2022-07-02 ASSESSMENT — PAIN - FUNCTIONAL ASSESSMENT
PAIN_FUNCTIONAL_ASSESSMENT: NONE - DENIES PAIN
PAIN_FUNCTIONAL_ASSESSMENT: NONE - DENIES PAIN

## 2022-07-03 VITALS
OXYGEN SATURATION: 91 % | TEMPERATURE: 98.7 F | WEIGHT: 240 LBS | SYSTOLIC BLOOD PRESSURE: 113 MMHG | BODY MASS INDEX: 38.57 KG/M2 | DIASTOLIC BLOOD PRESSURE: 71 MMHG | HEIGHT: 66 IN | HEART RATE: 74 BPM | RESPIRATION RATE: 15 BRPM

## 2022-07-03 PROCEDURE — 6370000000 HC RX 637 (ALT 250 FOR IP): Performed by: STUDENT IN AN ORGANIZED HEALTH CARE EDUCATION/TRAINING PROGRAM

## 2022-07-03 PROCEDURE — 2580000003 HC RX 258: Performed by: EMERGENCY MEDICINE

## 2022-07-03 PROCEDURE — 93005 ELECTROCARDIOGRAM TRACING: CPT | Performed by: STUDENT IN AN ORGANIZED HEALTH CARE EDUCATION/TRAINING PROGRAM

## 2022-07-03 PROCEDURE — 6360000002 HC RX W HCPCS: Performed by: EMERGENCY MEDICINE

## 2022-07-03 RX ORDER — MIDAZOLAM HYDROCHLORIDE 1 MG/ML
2 INJECTION INTRAMUSCULAR; INTRAVENOUS ONCE
Status: COMPLETED | OUTPATIENT
Start: 2022-07-03 | End: 2022-07-03

## 2022-07-03 RX ORDER — DIPHENHYDRAMINE HYDROCHLORIDE 50 MG/ML
50 INJECTION INTRAMUSCULAR; INTRAVENOUS ONCE
Status: COMPLETED | OUTPATIENT
Start: 2022-07-03 | End: 2022-07-03

## 2022-07-03 RX ADMIN — DIPHENHYDRAMINE HYDROCHLORIDE 50 MG: 50 INJECTION, SOLUTION INTRAMUSCULAR; INTRAVENOUS at 12:56

## 2022-07-03 RX ADMIN — MIDAZOLAM 2 MG: 1 INJECTION INTRAMUSCULAR; INTRAVENOUS at 12:56

## 2022-07-03 RX ADMIN — NITROFURANTOIN MONOHYDRATE/MACROCRYSTALLINE 100 MG: 25; 75 CAPSULE ORAL at 03:22

## 2022-07-03 RX ADMIN — ZIPRASIDONE MESYLATE 20 MG: 20 INJECTION, POWDER, LYOPHILIZED, FOR SOLUTION INTRAMUSCULAR at 12:56

## 2022-07-03 NOTE — ED NOTES
Patient resting comfortably on stretcher at this time.  Will continue to monitor     Alisa Vinson RN  07/03/22 0787

## 2022-07-03 NOTE — ED NOTES
Provisional Diagnosis:     Patient presented to ED via Law Enforcement/Squad and accompanied by Anna Marie Cantu on an application for emergency admission. Patient has history of schizoaffective disorder, borderline personality, PTSD and gender dysphoria disorder. Psychosocial and Contextual Factors:     Patient homeless. Patient refuses outpatient treatment. Patient has substance abuse issues. C-SSRS Summary:    Patient has significant history of suicidal ideation and past attempts. Patient reports he last attempted 2 weeks ago by \"jumping off the pier\" but he stated the police intervened and took him to [place]. Patient admits to current SI with a plan to jump off a bridge. Patient: X  Family:   Agency: X ([address])    Substance Abuse:  Patient reports daily cocaine use. Present Suicidal Behavior:    Patient has significant history of suicidal ideation and past attempts. Patient reports he last attempted 2 weeks ago by \"jumping off the pier\" but he stated the police intervened and took him to [place]. Patient admits to current SI with a plan to jump off a bridge. Verbal: X    Attempt: X    Past Suicidal Behavior:   Patient has significant history of SI with attempts. Patient's last attempt was roughly a week and a half ago when patient was admitted to [facility]. Patient also has significant history of cutting himself in attempt to end his life. Verbal: X    Attempt: X    Self-Injurious/Self-Mutilation:  Patient has several healing and healed cuts on his left arm. Patient appears to have over 100 scars proving self harm by cutting. Patient also admit to cutting. Patient was found in [place] to have a piece of glass in his mouth which he threatened to cut himself with while here. Violence Current or Past:  Upon entry into [facility] on this ED visit, patient threatened ED staff and himself. Patient threatened to \"run out of here\".   Patient also attempted to swing at officers and ED staff while administering medications. Patient was found to have a piece of glass in his mouth. Trauma Identified:    Patient has diagnosed history of PTSD, but refuses to engage with this writer. Patient did tell this writer that his mother commit suicide the day before his birthday. Protective Factors:    Patient has income. Patient has insurance. Risk Factors:    Patient has substance abuse issues. Patient homeless. Patient has no supports. Patient's mother commit suicide. Clinical Summary:    Patient is a 45year old biological female, who identifies as a male, who presented to ED via [facility] on an application for emergency admission. Patient needed escorted by [employer] due to threats to elope. Application for emergency admission states Herb Daniel is a 45year old female who presented to [address] [place] [address] w/ suicidal ideations, with a plan to jump off the bridge. Client had a recent suicide attempt by cutting her left arm. Clt reports auditory hallucinations, voices telling her to harm herself. Client's last attempt required stitches to her arm. Clt is non-compliant with psychiatric medications and reported Nilo Bella is due to the voices telling her not to take medications. Clt cannot contract safety, stating she wanted to leave the agency. Clt is a risk to self, and is being placed on involuntary status to ensure safety and further observation, treatment and crisis stabilization in a secure hospital setting. \"    Upon arrival to [facility], patient needed his arms to be held to prevent him from undoing the kameron on the stretcher. Patient refusing to be cooperative. Patient was able to eventually comply with basic requests and engaged in minimal conversation with this writer. Patient admit to suicidal ideation for \"all my life\". Patient reports his current plan is to jump off a bridge. Patient also reports to a recent attempt to \"jump off the pier\" but police intervened and took him to [facility].   Patient also has a recent attempt by cutting his left forearm that allegedly required stitches. Patient does have evidence of self-harm on his left forearm. Patient has both healing, healed and scars covering his left arm. Patient does admit to being \"a cutter\". Patient reports that he is suicidal because he \"can't quit the drugs\". Patient reports daily crack cocaine use. Patient also reports he is homeless and \"living on the streets\". Patient also reports auditory hallucinations in the voice of his mother telling him to \"come join her\". Patient did tell this writer his mother commit suicide in 12 the day before his birthday. Patient has had several psychiatric admissions and several past suicide attempts. Upon entry into the [facility], the patient refused to change out and refused to cooperate with [place] process. Patient made comments to ED staff that he \"will cut while I'm here\". After making this comment, patient was wanded by public safety to ensure there were no dangerous objects. Patient told staff \"you ain't going to find it\". Patient was then noticed to be moving something around in his mouth, patient refused to open his mouth, requiring staff intervention which lead to finding a piece of glass in his mouth roughly the size of a quarter. Patient needed medications in order to ensure staff safety and compliance. Patient also made the comment on several occasions that \"once I get out of here, I'm going right to that bridge\". Patient has no repercussions to his actions and does not have futuristic thoughts. Patient reports no desire to continue living. Level of Care Disposition: This writer consulted with Dr Darylene Right who stated that he does not feel that this patient would benefit from inpatient treatment as he has a history of refusing medications while on the unit, in addition to refusing to engage in the unit milieu.   During the patient's last [facility] admission, he was extremely disruptive to the unit and other patients, engaging in \"borderline behaviors\", causing potential harm to staff and patients. Dr Cornell Lemus denying I admission, indicating this patient would not benefit from inpatient psychiatric hospitalization. This writer will reach out to [facility] to identify another potential inpatient placement.

## 2022-07-03 NOTE — ED PROVIDER NOTES
EMERGENCY DEPARTMENT ENCOUNTER    Pt Name: Lorenzo Avila  MRN: [id number]  [place] 1970  Date of evaluation: 7/2/22  CHIEF COMPLAINT       Chief Complaint   Patient presents with    Mental Health Problem     HISTORY OF PRESENT ILLNESS   HPI  45year old female transgender prefers he/him/his goes by Sharon Rubio history of schizoaffective, borderline personality disorder, presents for evaluation under a pink slip for suicidal ideation. Came to the ER with [employer] and EMS from [facility] where he reported suicidal ideation with plan to jump off a bridge. History of suicide attempts in the past, most recently admitted in the middle of June this year. Reporting associated auditory hallucinations telling him to kill himself. No actual attempted self harm today       REVIEW OF SYSTEMS     Review of Systems   Constitutional: Negative for chills and fatigue. HENT: Negative for facial swelling, postnasal drip and rhinorrhea. Eyes: Negative for photophobia and itching. Respiratory: Negative for cough and shortness of breath. Cardiovascular: Negative for chest pain and leg swelling. Gastrointestinal: Negative for abdominal pain, diarrhea, nausea and vomiting. Genitourinary: Negative for dysuria, flank pain and hematuria. Musculoskeletal: Negative for arthralgias and joint swelling. Skin: Negative for color change and rash. Neurological: Negative for dizziness, numbness and headaches. Psychiatric/Behavioral: Positive for behavioral problems, dysphoric mood, hallucinations and suicidal ideas. Negative for self-injury.      PASTMEDICAL HISTORY     Past Medical History:   Diagnosis Date    Cocaine abuse [facility])      Past Problem List  Patient Active Problem List   Diagnosis Code    Schizoaffective disorder, depressive type ([address] F25.1    Acute cystitis N30.00    Right ear pain H92.01    Acute psychosis ([address] F23    Borderline personality disorder ([address] F60.3    Cocaine abuse ([address] F14.10    Alcohol abuse F10.10    Morbid obesity ([facility] E66.01    Alcoholism ([facility] F10.20    Mixed hyperlipidemia E78.2    Essential hypertension I10    Laceration of left hand without foreign body S61.412A    Depression with suicidal ideation F32. A, R45.851     SURGICAL HISTORY       Past Surgical History:   Procedure Laterality Date    DILATION AND CURETTAGE OF UTERUS      SALPINGO-OOPHORECTOMY       CURRENT MEDICATIONS       Previous Medications    ALBUTEROL SULFATE (PROAIR RESPICLICK) 023 (90 BASE) MCG/ACT AEROSOL POWDER INHALATION    Inhale 2 puffs into the lungs every 4 hours as needed for Wheezing or Shortness of Breath    ATORVASTATIN (LIPITOR) 20 MG TABLET    Take 1 tablet by mouth nightly    BENZTROPINE (COGENTIN) 0.5 MG TABLET    Take 1 tablet by mouth 2 times daily    FUROSEMIDE (LASIX) 20 MG TABLET    Take 1 tablet by mouth daily    LISINOPRIL (PRINIVIL;ZESTRIL) 5 MG TABLET    Take 1 tablet by mouth daily    RISPERIDONE (RISPERDAL) 1 MG TABLET    Take 1 tablet by mouth 2 times daily    SERTRALINE (ZOLOFT) 100 MG TABLET    Take 1 tablet by mouth daily     ALLERGIES     is allergic to haldol [haloperidol], pcn [penicillins], sulfa antibiotics, and tegretol [carbamazepine]. FAMILY HISTORY     has no family status information on file. SOCIAL HISTORY       Social History     Tobacco Use    Smoking status: Current Every Day Smoker     Types: Cigarettes    Smokeless tobacco: Never Used   Substance Use Topics    Alcohol use: Not Currently    Drug use: Not on file     PHYSICAL EXAM     INITIAL VITALS: /71   Pulse 74   Temp 98.7 °F (37.1 °C) (Axillary)   Resp 15 Comment:  charge nurse made aware of pt's SPO2, pt sleeping  Ht 5' 6\" (1.676 m)   SpO2 91%   BMI 39.54 kg/m²    Physical Exam  Constitutional:       Appearance: He is normal weight. HENT:      Head: Normocephalic and atraumatic. Eyes:      Extraocular Movements: Extraocular movements intact.       Pupils: Pupils are equal, round, and reactive to light. Cardiovascular:      Rate and Rhythm: Normal rate and regular rhythm. Pulmonary:      Effort: Pulmonary effort is normal.      Breath sounds: Normal breath sounds. Abdominal:      General: Abdomen is flat. There is no distension. Palpations: There is no mass. Musculoskeletal:         General: No swelling. Normal range of motion. Cervical back: Normal range of motion and neck supple. Skin:     General: Skin is warm and dry. Neurological:      General: No focal deficit present. Mental Status: He is alert. Mental status is at baseline. Psychiatric:      Comments: Suicidal, calm and redirectable          MEDICAL DECISION MAKINyear old presents for evaluation of suicidal ideation under a pink slip. Will check labs to medically clear and plan for psychiatric admission     After patient was initially calm he did refuse to change a code, developed some agitation, was punching the wall, required intramuscular medication. Patient's medical work-up is unremarkable. Urinalysis shows some signs of infection will treat with p.o. antibiotics. Vital signs are stable. Per nursing the patient was found to have a piece of glass in his mouth which he said he found on the street. No signs of laceration or trauma on his tongue or mouth. Obtain chest x-ray and abdominal x-ray which showed no evidence of glass foreign body. Patient is medically cleared for psychiatric evaluation. Patient was declined for admission to [facility]. He was accepted at a facility outside of [facility] [place]. We will transfer for inpatient psychiatric admission.          CRITICAL CARE:       PROCEDURES:    Procedures    DIAGNOSTIC RESULTS   EKG:All EKG's are interpreted by the Emergency Department Physician who either signs or Co-signs this chart in the absence of a cardiologist.    Sinus rhythm rate of 65 normal axis normal intervals no concerning ST or T wave changes    RADIOLOGY:All plain film, CT, MRI, and formal ultrasound images (except ED bedside ultrasound) are read by the radiologist, see reports below, unless otherwisenoted in MDM or here. XR CHEST PORTABLE   Preliminary Result   No acute cardiopulmonary process. Nonspecific nonobstructive bowel gas pattern. Small rounded densities projecting over the chest and abdomen are probably   related to the patient's clothing but should be correlated with physical exam   findings. No definite ingested radiopaque metallic foreign bodies are seen. XR ABDOMEN (KUB) (SINGLE AP VIEW)   Preliminary Result   No acute cardiopulmonary process. Nonspecific nonobstructive bowel gas pattern. Small rounded densities projecting over the chest and abdomen are probably   related to the patient's clothing but should be correlated with physical exam   findings. No definite ingested radiopaque metallic foreign bodies are seen. LABS: All lab results were reviewed by myself, and all abnormals are listed below.   Labs Reviewed   CBC WITH AUTO DIFFERENTIAL - Abnormal; Notable for the following components:       Result Value    WBC 12.7 (*)     Lymphocytes 22 (*)     Monocytes 8 (*)     Absolute Eos # 0.50 (*)     All other components within normal limits   COMPREHENSIVE METABOLIC PANEL W/ REFLEX TO MG FOR LOW K - Abnormal; Notable for the following components:    Glucose 105 (*)     Alkaline Phosphatase 123 (*)     All other components within normal limits   URINALYSIS WITH MICROSCOPIC - Abnormal; Notable for the following components:    Color, UA Dark Yellow (*)     Ketones, Urine TRACE (*)     Protein, UA TRACE (*)     Leukocyte Esterase, Urine SMALL (*)     Bacteria, UA FEW (*)     All other components within normal limits   URINE DRUG SCREEN - Abnormal; Notable for the following components:    Cocaine Metabolite, Urine POSITIVE (*)     All other components within normal limits   COVID-19 & INFLUENZA COMBO   ETHANOL       EMERGENCY DEPARTMENTCOURSE: Vitals:    Vitals:    07/02/22 2047 07/02/22 2300   BP: 117/83 113/71   Pulse: 73 74   Resp: 16 15   Temp:  98.7 °F (37.1 °C)   TempSrc:  Axillary   SpO2: 95% 91%   Height: 5' 6\" (1.676 m)        The patient was given the following medications while in the emergency department:  Orders Placed This Encounter   Medications    midazolam (VERSED) injection 2 mg    ziprasidone (GEODON) 20 mg in sterile water 1 mL injection    nitrofurantoin (macrocrystal-monohydrate) (MACROBID) capsule 100 mg     Order Specific Question:   Antimicrobial Indications     Answer:   Urinary Tract Infection     CONSULTS:  None    FINAL IMPRESSION      1. Suicidal ideation          DISPOSITION/PLAN   DISPOSITION        PATIENT REFERRED TO:  No follow-up provider specified. DISCHARGE MEDICATIONS:  New Prescriptions    No medications on file     The care is provided during an unprecedented national emergency due to the novel coronavirus, COVID 19.   MD Ivelisse Wayne MD  07/03/22 4262

## 2022-07-03 NOTE — ED NOTES
Patient states he does not want to go to another hospital and states \"i'll jump out of the vehicle. \" ED physician notified.

## 2022-07-03 NOTE — ED NOTES
[facility] [place] arranged transportation -  is scheduled for 1130am    Report should be called to [phone]

## 2022-07-03 NOTE — ED NOTES
Patient accepted to [facility], requesting EKG to be faxed to their facility at [phone] normal...

## 2022-07-03 NOTE — ED NOTES
Transport here for transfer to another facility. Patient stating he was not going to get onto transfer stretcher. Patient medicated at this time. Patient stated to transport he was going to open the door and jump out.       Giorgio Edwards RN  07/03/22 8986

## 2022-07-03 NOTE — ED NOTES
This writer phoned [address] [facility] [facility]. in regards to an [facility] [place]-ER referral.  They indicated they don't have any staff due to shortage to come assess patient until tomorrow. This writer explained to them, Argelia Barrientos has already assessed the patient and determined to pink slip him here. They were also informed the patient was denied by psychiatry and may benefit from higher level of care. This writer offered to send mine and ED physician's assessment to add to their evaluation along with application for emergency admission. This information was faxed to [facility]..

## 2022-07-03 NOTE — ED TRIAGE NOTES
Mode of arrival (squad #, walk in, police, etc) : [employer] and [employer] and [facility] staff        Chief complaint(s):[place] Slip      Arrival Note (brief scenario, treatment PTA, etc). : here per [employer] restrained on cart up per self to chair in [place] but not following directions still Pink [place] no cooperative threatening harm to self  After change out patient had broken piece of glass in mouth         C= \"Have you ever felt that you should Cut down on your drinking? \"  Refused  A= \"Have people Annoyed you by criticizing your drinking? \"  Refused  G= \"Have you ever felt bad or Guilty about your drinking? \"  Refused  E= \"Have you ever had a drink as an Eye-opener first thing in the morning to steady your nerves or to help a hangover? \"  Refused      Deferred []      Reason for deferring: N/A    *If yes to two or more: probable alcohol abuse. *

## 2022-07-05 LAB
EKG ATRIAL RATE: 65 BPM
EKG P AXIS: 29 DEGREES
EKG P-R INTERVAL: 144 MS
EKG Q-T INTERVAL: 460 MS
EKG QRS DURATION: 88 MS
EKG QTC CALCULATION (BAZETT): 478 MS
EKG R AXIS: 11 DEGREES
EKG T AXIS: 32 DEGREES
EKG VENTRICULAR RATE: 65 BPM

## 2022-07-05 PROCEDURE — 93010 ELECTROCARDIOGRAM REPORT: CPT | Performed by: INTERNAL MEDICINE

## 2022-09-13 ENCOUNTER — TELEPHONE (OUTPATIENT)
Dept: FAMILY MEDICINE CLINIC | Age: 52
End: 2022-09-13

## 2022-09-13 NOTE — TELEPHONE ENCOUNTER
Fabricioilir Majorren called office stating he just moved here from [place]. He is looking for a LGBTQ friendly family doctor. He gets testosterone injections. Pt was seeing Dr. Rothman Son with [facility] in [place]. Pt says he already has a mental health doctor and is part of the [address] [place] [address] [place]. Pt asked that we call his friend's number [phone] for now, pt is getting a new phone either this Thursday or Friday. Please advise.

## 2022-09-15 ENCOUNTER — HOSPITAL ENCOUNTER (EMERGENCY)
Age: 52
Discharge: HOME | End: 2022-09-15
Payer: MEDICARE

## 2022-09-15 VITALS
OXYGEN SATURATION: 97 % | BODY MASS INDEX: 47.09 KG/M2 | TEMPERATURE: 97.2 F | SYSTOLIC BLOOD PRESSURE: 133 MMHG | RESPIRATION RATE: 20 BRPM | DIASTOLIC BLOOD PRESSURE: 87 MMHG | WEIGHT: 293 LBS | HEIGHT: 66 IN | HEART RATE: 78 BPM

## 2022-09-15 DIAGNOSIS — H66.006 RECURRENT ACUTE SUPPURATIVE OTITIS MEDIA WITHOUT SPONTANEOUS RUPTURE OF TYMPANIC MEMBRANE OF BOTH SIDES: ICD-10-CM

## 2022-09-15 DIAGNOSIS — H60.393 INFECTIVE OTITIS EXTERNA OF BOTH EARS: Primary | ICD-10-CM

## 2022-09-15 PROCEDURE — 99213 OFFICE O/P EST LOW 20 MIN: CPT

## 2022-09-15 RX ORDER — OFLOXACIN 3 MG/ML
5 SOLUTION AURICULAR (OTIC) 2 TIMES DAILY
Qty: 5 ML | Refills: 0 | Status: SHIPPED | OUTPATIENT
Start: 2022-09-15 | End: 2022-09-25

## 2022-09-15 RX ORDER — BACLOFEN 500 UG/ML
25 INJECTION, SOLUTION INTRATHECAL ONCE
COMMUNITY

## 2022-09-15 RX ORDER — BACLOFEN 10 MG/1
10 TABLET ORAL 3 TIMES DAILY
COMMUNITY

## 2022-09-15 RX ORDER — HYDROXYZINE HYDROCHLORIDE 25 MG/1
25 TABLET, FILM COATED ORAL 3 TIMES DAILY PRN
COMMUNITY

## 2022-09-15 RX ORDER — CLARITHROMYCIN 500 MG/1
500 TABLET, COATED ORAL 2 TIMES DAILY
Qty: 20 TABLET | Refills: 0 | Status: SHIPPED | OUTPATIENT
Start: 2022-09-15 | End: 2022-09-20

## 2022-09-15 ASSESSMENT — PAIN - FUNCTIONAL ASSESSMENT
PAIN_FUNCTIONAL_ASSESSMENT: 0-10
PAIN_FUNCTIONAL_ASSESSMENT: ACTIVITIES ARE NOT PREVENTED

## 2022-09-15 ASSESSMENT — PAIN DESCRIPTION - ONSET: ONSET: ON-GOING

## 2022-09-15 ASSESSMENT — PAIN DESCRIPTION - LOCATION: LOCATION: EAR

## 2022-09-15 ASSESSMENT — PAIN DESCRIPTION - DESCRIPTORS: DESCRIPTORS: DISCOMFORT

## 2022-09-15 ASSESSMENT — PAIN DESCRIPTION - FREQUENCY: FREQUENCY: CONTINUOUS

## 2022-09-15 ASSESSMENT — PAIN DESCRIPTION - ORIENTATION: ORIENTATION: RIGHT

## 2022-09-15 ASSESSMENT — ENCOUNTER SYMPTOMS: SINUS PRESSURE: 0

## 2022-09-15 ASSESSMENT — PAIN SCALES - GENERAL: PAINLEVEL_OUTOF10: 8

## 2022-09-15 ASSESSMENT — PAIN DESCRIPTION - PAIN TYPE: TYPE: ACUTE PAIN

## 2022-09-15 NOTE — DISCHARGE INSTRUCTIONS
Complete the entire course of antibiotics. Take probiotics, or eat yogurt, at least 2 hours before or after the antibiotics. Follow up with ENT as listed. Be dure to drink plenty of fluids.

## 2022-09-15 NOTE — ED PROVIDER NOTES
[facility] 36  Urgent Care Encounter       CHIEF COMPLAINT       Chief Complaint   Patient presents with    Otalgia     Right ear       Nurses Notes reviewed and I agree except as noted in the HPI. HISTORY OF PRESENT ILLNESS   Silverio Clayton is a 45 y.o. adult who presents ear pain in the right ear. Was encouraged to go to ENT, but he had been an addict and now that he is clean, he will go see them as he has a history of recurrent ear infections. The history is provided by the patient. No  was used. REVIEW OF SYSTEMS     Review of Systems   Constitutional:  Negative for fatigue and fever. HENT:  Positive for ear pain. Negative for ear discharge and sinus pressure. PAST MEDICAL HISTORY         Diagnosis Date    Cocaine abuse ([facility] [address]        SURGICALHISTORY     Patient  has a past surgical history that includes Salpingo-oophorectomy and Dilation and curettage of uterus.     CURRENT MEDICATIONS       Discharge Medication List as of 9/15/2022  4:09 PM        CONTINUE these medications which have NOT CHANGED    Details   baclofen (LIORESAL) 10 MG/20ML SOLN 25 mcg by Intrathecal route onceHistorical Med      baclofen (LIORESAL) 10 MG tablet Take 10 mg by mouth 3 times dailyHistorical Med      hydrOXYzine HCl (ATARAX) 25 MG tablet Take 25 mg by mouth 3 times daily as needed for ItchingHistorical Med      lisinopril (PRINIVIL;ZESTRIL) 5 MG tablet Take 1 tablet by mouth daily, Disp-30 tablet, R-3Normal      risperiDONE (RISPERDAL) 1 MG tablet Take 1 tablet by mouth 2 times daily, Disp-60 tablet, R-0Normal      furosemide (LASIX) 20 MG tablet Take 1 tablet by mouth daily, Disp-60 tablet, R-0Normal      benztropine (COGENTIN) 0.5 MG tablet Take 1 tablet by mouth 2 times daily, Disp-60 tablet, R-3Normal      atorvastatin (LIPITOR) 20 MG tablet Take 1 tablet by mouth nightly, Disp-30 tablet, R-0Normal      albuterol sulfate (PROAIR RESPICLICK) 891 (90 Base) MCG/ACT aerosol powder inhalation Inhale 2 puffs into the lungs every 4 hours as needed for Wheezing or Shortness of Breath, Disp-1 each, R-0Normal             ALLERGIES     Patient is is allergic to haldol [haloperidol], pcn [penicillins], sulfa antibiotics, and tegretol [carbamazepine]. Patients   Immunization History   Administered Date(s) Administered    COVID-19, J&J, (age 18y+), IM, 0.5 mL 03/18/2021       FAMILY HISTORY     Patient's family history is not on file. SOCIAL HISTORY     Patient  reports that he has been smoking cigarettes. He has never used smokeless tobacco. He reports that he does not currently use alcohol. Drug: Cocaine. PHYSICAL EXAM     ED TRIAGE VITALS  BP: 133/87, Temp: 97.2 °F (36.2 °C), Heart Rate: 78, Resp: 20, SpO2: 97 %,Estimated body mass index is 48.1 kg/m² as calculated from the following:    Height as of this encounter: 5' 6\" (1.676 m). Weight as of this encounter: 298 lb (135.2 kg). ,No LMP recorded. Patient is postmenopausal.    Physical Exam  Constitutional:       Appearance: He is obese. HENT:      Head: Normocephalic. Right Ear: A middle ear effusion is present. Tympanic membrane is erythematous. Left Ear: A middle ear effusion is present. Tympanic membrane is erythematous. Nose: Nose normal.      Mouth/Throat:      Mouth: Mucous membranes are moist.   Cardiovascular:      Rate and Rhythm: Normal rate. Pulmonary:      Effort: Pulmonary effort is normal.   Musculoskeletal:         General: Normal range of motion. Cervical back: Normal range of motion. Skin:     General: Skin is warm. Capillary Refill: Capillary refill takes less than 2 seconds. Neurological:      General: No focal deficit present. Mental Status: He is alert and oriented to person, place, and time.    Psychiatric:         Mood and Affect: Mood normal.         Behavior: Behavior normal.       DIAGNOSTIC RESULTS     Labs:No results found for this visit on 09/15/22. IMAGING:    No orders to display         EKG:      URGENT CARE COURSE:     Vitals:    09/15/22 1443   BP: 133/87   Pulse: 78   Resp: 20   Temp: 97.2 °F (36.2 °C)   TempSrc: Temporal   SpO2: 97%   Weight: 298 lb (135.2 kg)   Height: 5' 6\" (1.676 m)       Medications - No data to display         PROCEDURES:  None    FINAL IMPRESSION      1. Infective otitis externa of both ears    2. Recurrent acute suppurative otitis media without spontaneous rupture of tympanic membrane of both sides          DISPOSITION/ PLAN     Discharged patient home with oral and otic antibiotics. Instructed patient to complete the entire course of antibiotics and take a probiotic, or eat yogurt, at least 2 hours before or after the antibiotic. Instructed to follow-up with ENT for re-current ear infections. Drink plenty of fluids. Take tylenol and/or ibuprofen as needed for pain and fever. Patient verbalized understanding and agrees with the plan. PATIENT REFERRED TO:  No primary care provider on file. No primary physician on file.       DISCHARGE MEDICATIONS:  Discharge Medication List as of 9/15/2022  4:09 PM        START taking these medications    Details   clarithromycin (BIAXIN) 500 MG tablet Take 1 tablet by mouth 2 times daily for 10 days, Disp-20 tablet, R-0Normal      ofloxacin (FLOXIN) 0.3 % otic solution Place 5 drops into both ears 2 times daily for 10 days, Disp-5 mL, R-0Normal             Discharge Medication List as of 9/15/2022  4:09 PM          Discharge Medication List as of 9/15/2022  4:09 PM          KANCHAN Obrien CNP    (Please note that portions of this note were completed with a voice recognition program. Efforts were made to edit the dictations but occasionally words are mis-transcribed.)           KANCHAN Obrien CNP  09/15/22 1612

## 2022-09-20 ENCOUNTER — OFFICE VISIT (OUTPATIENT)
Dept: FAMILY MEDICINE CLINIC | Age: 52
End: 2022-09-20
Payer: MEDICARE

## 2022-09-20 ENCOUNTER — TELEPHONE (OUTPATIENT)
Dept: FAMILY MEDICINE CLINIC | Age: 52
End: 2022-09-20

## 2022-09-20 VITALS
DIASTOLIC BLOOD PRESSURE: 78 MMHG | TEMPERATURE: 98 F | HEART RATE: 78 BPM | OXYGEN SATURATION: 98 % | WEIGHT: 293 LBS | HEIGHT: 66 IN | RESPIRATION RATE: 16 BRPM | SYSTOLIC BLOOD PRESSURE: 124 MMHG | BODY MASS INDEX: 47.09 KG/M2

## 2022-09-20 DIAGNOSIS — R60.0 PEDAL EDEMA: ICD-10-CM

## 2022-09-20 DIAGNOSIS — F25.1 SCHIZOAFFECTIVE DISORDER, DEPRESSIVE TYPE (HCC): ICD-10-CM

## 2022-09-20 DIAGNOSIS — M19.90 ARTHRITIS: ICD-10-CM

## 2022-09-20 DIAGNOSIS — E78.2 MIXED HYPERLIPIDEMIA: ICD-10-CM

## 2022-09-20 DIAGNOSIS — Z72.0 TOBACCO ABUSE: ICD-10-CM

## 2022-09-20 DIAGNOSIS — M54.50 LUMBAR PAIN: ICD-10-CM

## 2022-09-20 DIAGNOSIS — H92.01 RIGHT EAR PAIN: ICD-10-CM

## 2022-09-20 DIAGNOSIS — F19.11 HISTORY OF DRUG ABUSE (HCC): ICD-10-CM

## 2022-09-20 DIAGNOSIS — F60.3 BORDERLINE PERSONALITY DISORDER (HCC): ICD-10-CM

## 2022-09-20 DIAGNOSIS — J45.40 MODERATE PERSISTENT ASTHMA WITHOUT COMPLICATION: ICD-10-CM

## 2022-09-20 DIAGNOSIS — Z78.9 FEMALE-TO-MALE TRANSGENDER PERSON: ICD-10-CM

## 2022-09-20 DIAGNOSIS — M54.50 LUMBAR PAIN: Primary | ICD-10-CM

## 2022-09-20 DIAGNOSIS — Z00.00 ENCOUNTER FOR MEDICAL EXAMINATION TO ESTABLISH CARE: Primary | ICD-10-CM

## 2022-09-20 PROBLEM — F32.A DEPRESSION WITH SUICIDAL IDEATION: Status: RESOLVED | Noted: 2022-06-17 | Resolved: 2022-09-20

## 2022-09-20 PROBLEM — F23 ACUTE PSYCHOSIS (HCC): Status: RESOLVED | Noted: 2022-06-10 | Resolved: 2022-09-20

## 2022-09-20 PROBLEM — N30.00 ACUTE CYSTITIS: Status: RESOLVED | Noted: 2020-11-21 | Resolved: 2022-09-20

## 2022-09-20 PROBLEM — S61.412A LACERATION OF LEFT HAND WITHOUT FOREIGN BODY: Status: RESOLVED | Noted: 2022-06-11 | Resolved: 2022-09-20

## 2022-09-20 PROBLEM — R45.851 DEPRESSION WITH SUICIDAL IDEATION: Status: RESOLVED | Noted: 2022-06-17 | Resolved: 2022-09-20

## 2022-09-20 PROCEDURE — G8427 DOCREV CUR MEDS BY ELIG CLIN: HCPCS | Performed by: NURSE PRACTITIONER

## 2022-09-20 PROCEDURE — 4004F PT TOBACCO SCREEN RCVD TLK: CPT | Performed by: NURSE PRACTITIONER

## 2022-09-20 PROCEDURE — 3017F COLORECTAL CA SCREEN DOC REV: CPT | Performed by: NURSE PRACTITIONER

## 2022-09-20 PROCEDURE — G8417 CALC BMI ABV UP PARAM F/U: HCPCS | Performed by: NURSE PRACTITIONER

## 2022-09-20 PROCEDURE — 99204 OFFICE O/P NEW MOD 45 MIN: CPT | Performed by: NURSE PRACTITIONER

## 2022-09-20 RX ORDER — M-VIT,TX,IRON,MINS/CALC/FOLIC 27MG-0.4MG
1 TABLET ORAL DAILY
COMMUNITY

## 2022-09-20 RX ORDER — TESTOSTERONE CYPIONATE 200 MG/ML
200 INJECTION INTRAMUSCULAR
COMMUNITY
Start: 2021-11-01

## 2022-09-20 RX ORDER — MELOXICAM 15 MG/1
15 TABLET ORAL DAILY PRN
COMMUNITY
Start: 2022-08-29

## 2022-09-20 RX ORDER — DIVALPROEX SODIUM 500 MG/1
TABLET, EXTENDED RELEASE ORAL
COMMUNITY
Start: 2022-09-08

## 2022-09-20 SDOH — ECONOMIC STABILITY: FOOD INSECURITY: WITHIN THE PAST 12 MONTHS, YOU WORRIED THAT YOUR FOOD WOULD RUN OUT BEFORE YOU GOT MONEY TO BUY MORE.: NEVER TRUE

## 2022-09-20 SDOH — ECONOMIC STABILITY: FOOD INSECURITY: WITHIN THE PAST 12 MONTHS, THE FOOD YOU BOUGHT JUST DIDN'T LAST AND YOU DIDN'T HAVE MONEY TO GET MORE.: NEVER TRUE

## 2022-09-20 ASSESSMENT — ENCOUNTER SYMPTOMS
GASTROINTESTINAL NEGATIVE: 1
RHINORRHEA: 0
SINUS PAIN: 0
BACK PAIN: 1
RESPIRATORY NEGATIVE: 1
FACIAL SWELLING: 0
SINUS PRESSURE: 0

## 2022-09-20 ASSESSMENT — PATIENT HEALTH QUESTIONNAIRE - PHQ9
SUM OF ALL RESPONSES TO PHQ QUESTIONS 1-9: 0
SUM OF ALL RESPONSES TO PHQ QUESTIONS 1-9: 0
2. FEELING DOWN, DEPRESSED OR HOPELESS: 0
SUM OF ALL RESPONSES TO PHQ QUESTIONS 1-9: 0
SUM OF ALL RESPONSES TO PHQ9 QUESTIONS 1 & 2: 0
1. LITTLE INTEREST OR PLEASURE IN DOING THINGS: 0
SUM OF ALL RESPONSES TO PHQ QUESTIONS 1-9: 0

## 2022-09-20 ASSESSMENT — SOCIAL DETERMINANTS OF HEALTH (SDOH): HOW HARD IS IT FOR YOU TO PAY FOR THE VERY BASICS LIKE FOOD, HOUSING, MEDICAL CARE, AND HEATING?: NOT HARD AT ALL

## 2022-09-20 NOTE — PROGRESS NOTES
[facility]  [address] [place] [facility] [place] [address]  Dept: [phone]  Dept Fax: [phone]  Loc: Hannah Lopez is a 45 y.o. adultwho presents today for his medical conditions/complaints as noted below. Mera Guillermo c/o of New Patient (To get established. Manage testosterone )      HPI:      Pt here to establish care. Pt recently moved here from [facility]. States he is a recovering drug addict and moved here for treatment at [facility]. He is also seeing a NP there that is treating his mental health issues. He states he has been diagnosed with schizophrenia, personality disorder. He does have some anger issues and is at risk for violence per his chart. Pt by himself today. Pt is a female to male transgender and receives testosterone injections. Asking for continued testosterone injections. HPI  Testosterone:   -has been in [place] for a few months dealing with stuff with his dad  -now back here and needs to get testosterone refill   -has been getting testosterone in [place]   -starting to have some facial hair growth       Pt with c/o right ear pain. States he has chronic right ear pain with ear infections. Recently seen at [facility] for this and diagnosed with OM and OE given oral atb and ear drops. Back Pain    HPI:  Pain is present in the lumbar region. Symptoms have been present for several years  year(s). The pain is constant, moderate. The patient describes the pain as aching and burning. Inciting injury or history of trauma? No  Pain is aggravated by - movement, walking, standing, sitting  Pain is relieved by - lying down  Radiation of the pain? No  Paresthesias of the extremities? No  Saddle anesthesia? No  Bowel or bladder incontinence? No  Treatments tried - NSAIDs and topical analgesics ambulates with a cane. Would like a mobility eval for a scooter.       Pt states he takes lasix daily for BLE, denies heart failure. Oleg sob or orthopnea. Also admits to asthma states it is controlled with ventolin prn , denies frequent coughing or sob , does smoke 1 ppd         Current Outpatient Medications   Medication Sig Dispense Refill    divalproex (DEPAKOTE ER) 500 MG extended release tablet       meloxicam (MOBIC) 15 MG tablet Take 15 mg by mouth daily as needed      Multiple Vitamins-Minerals (THERAPEUTIC MULTIVITAMIN-MINERALS) tablet Take 1 tablet by mouth daily      testosterone cypionate (DEPOTESTOTERONE CYPIONATE) 200 MG/ML injection Inject 200 mg into the muscle every 14 days. baclofen (LIORESAL) 10 MG tablet Take 10 mg by mouth 3 times daily      hydrOXYzine HCl (ATARAX) 25 MG tablet Take 25 mg by mouth 3 times daily as needed for Itching      risperiDONE (RISPERDAL) 1 MG tablet Take 1 tablet by mouth 2 times daily 60 tablet 0    furosemide (LASIX) 20 MG tablet Take 1 tablet by mouth daily 60 tablet 0    benztropine (COGENTIN) 0.5 MG tablet Take 1 tablet by mouth 2 times daily 60 tablet 3    atorvastatin (LIPITOR) 20 MG tablet Take 1 tablet by mouth nightly (Patient taking differently: Take 10 mg by mouth nightly) 30 tablet 0    albuterol sulfate (PROAIR RESPICLICK) 973 (90 Base) MCG/ACT aerosol powder inhalation Inhale 2 puffs into the lungs every 4 hours as needed for Wheezing or Shortness of Breath 1 each 0    baclofen (LIORESAL) 10 MG/20ML SOLN 25 mcg by Intrathecal route once (Patient not taking: Reported on 9/20/2022)      ofloxacin (FLOXIN) 0.3 % otic solution Place 5 drops into both ears 2 times daily for 10 days (Patient not taking: Reported on 9/20/2022) 5 mL 0     No current facility-administered medications for this visit. Past Medical History:   Diagnosis Date    Cocaine abuse [facility])       Past Surgical History:   Procedure Laterality Date    DILATION AND CURETTAGE OF UTERUS      SALPINGO-OOPHORECTOMY       History reviewed. No pertinent family history.   Social History     Tobacco Use Smoking status: Every Day     Types: Cigarettes    Smokeless tobacco: Never   Substance Use Topics    Alcohol use: Not Currently        Allergies   Allergen Reactions    Haldol [Haloperidol] Other (See Comments)     Reports \"bad body shaking\"    Pcn [Penicillins]      As child    Sulfa Antibiotics      As child    Tegretol [Carbamazepine] Hives     And convulsions       Health Maintenance   Topic Date Due    Depression Monitoring  Never done    HIV screen  Never done    Hepatitis C screen  Never done    Diabetes screen  Never done    Pneumococcal 0-64 years Vaccine (2 - PCV) 11/16/2018    Shingles vaccine (1 of 2) Never done    COVID-19 Vaccine (2 - Booster for [employer] series) 05/13/2021    Annual Wellness Visit (AWV)  Never done    Flu vaccine (1) 09/01/2022    Lipids  06/27/2023    Breast cancer screen  04/18/2024    Cervical cancer screen  04/22/2025    DTaP/Tdap/Td vaccine (4 - Td or Tdap) 08/25/2030    Colorectal Cancer Screen  04/21/2032    Hepatitis A vaccine  Aged Out    Hepatitis B vaccine  Aged Out    Hib vaccine  Aged Out    Meningococcal (ACWY) vaccine  Aged Out       Subjective:      Review of Systems   Constitutional:  Negative for chills, fatigue and fever. HENT:  Positive for ear pain. Negative for congestion, ear discharge, facial swelling, rhinorrhea, sinus pressure and sinus pain. Respiratory: Negative. Cardiovascular: Negative. Gastrointestinal: Negative. Genitourinary:  Negative for difficulty urinating and dysuria. Musculoskeletal:  Positive for arthralgias, back pain and joint swelling. Negative for myalgias, neck pain and neck stiffness. Skin: Negative. Neurological:  Negative for dizziness, facial asymmetry, weakness, light-headedness, numbness and headaches. Psychiatric/Behavioral:  Positive for agitation and sleep disturbance. Negative for decreased concentration, dysphoric mood, hallucinations, self-injury and suicidal ideas. The patient is nervous/anxious. Objective:      /78   Pulse 78   Temp 98 °F (36.7 °C)   Resp 16   Ht 5' 6\" (1.676 m)   Wt (!) 301 lb 12.8 oz (136.9 kg)   SpO2 98%   BMI 48.71 kg/m²      Physical Exam  Vitals and nursing note reviewed. Constitutional:       Appearance: He is not ill-appearing. HENT:      Right Ear: Hearing normal. A middle ear effusion is present. There is no impacted cerumen. Tympanic membrane is bulging. Tympanic membrane is not erythematous. Left Ear: Hearing, tympanic membrane, ear canal and external ear normal.      Ears:      Comments: Mild pain with manipulation of tragus   Cardiovascular:      Rate and Rhythm: Normal rate and regular rhythm. Pulses: Normal pulses. Heart sounds: Normal heart sounds. No murmur heard. Pulmonary:      Effort: Pulmonary effort is normal. No respiratory distress. Breath sounds: Normal breath sounds. No wheezing. Musculoskeletal:      Comments: Back exam - limited range of motion, pain with motion noted during exam, tenderness noted with palpation of bilateral SI joint , normal reflexes and strength bilateral lower extremities, sensory exam intact bilateral lower extremities, Straight leg raise - negative b/l, 5/5 strength globally and symmetrically in the LEs, 2+ patellar reflexes b/l    Skin:     General: Skin is warm and dry. Capillary Refill: Capillary refill takes less than 2 seconds. Neurological:      General: No focal deficit present. Mental Status: He is alert and oriented to person, place, and time. Psychiatric:         Attention and Perception: Attention normal.         Mood and Affect: Mood and affect normal. Mood is not depressed. Affect is not labile, angry or inappropriate. Speech: Speech is rapid and pressured. Behavior: Behavior is hyperactive. Behavior is cooperative. Thought Content: Thought content normal. Thought content does not include homicidal or suicidal plan.          Cognition and Memory: Cognition normal.         Judgment: Judgment is impulsive. Assessment/Plan:           1. Encounter for medical examination to establish care      2. [facility] ([facility]  Continue with counseling    3. Schizoaffective disorder, depressive type ([facility]  Continue with mental health provider    4. Borderline personality disorder ([facility]    #3  5. Pedal edema  Await labs, may need to consider potassium supplement  Exam not consistent with CHF,. 6. Arthritis  Eval for mobility power scooter   - [address] [facility]    7. Mixed hyperlipidemia  Low fat low cholesterol diet   - Comprehensive Metabolic Panel; Future  - Lipid Panel; Future    8. Lumbar pain  monitor  - [address] [facility]    9. Tobacco abuse    Declines tobacco cessation   10. Moderate persistent asthma without complication  Controlled with albuterol for now     11. Right ear pain    - Diamond Alexander MD, Otolaryngology, [place]. Female-to-male transgender person  Will need to refer to Endocrinology, pt may want to return to [facility] for treatment. - Testosterone, free, total; Future  - PSA Prostatic Specific Antigen; Future      Return in about 3 months (around 12/20/2022) for follow up. Reccommended tobaccocessation options including pharmacologic methods, counseled great than 3 minutesduring this visit:  Yes[]  No  []       Patient given educational materials -see patient instructions. Discussed use, benefit, and side effects of prescribedmedications. All patient questions answered. Pt voiced understanding. Reviewedhealth maintenance. Instructed to continue current medications, diet and exercise. Patient agreed with treatment plan. Follow up as directed.        Electronicallysigned by KANCHAN Neff CNP on 9/20/2022 at 3:17 PM

## 2022-09-20 NOTE — TELEPHONE ENCOUNTER
Katherine with [facility] states they need a new order with the FCE removed from the  motor scooter eval    They will get the order from epic

## 2022-09-27 ENCOUNTER — TELEPHONE (OUTPATIENT)
Dept: FAMILY MEDICINE CLINIC | Age: 52
End: 2022-09-27

## 2022-09-27 ENCOUNTER — HOSPITAL ENCOUNTER (EMERGENCY)
Age: 52
Discharge: PSYCHIATRIC OR SUBSTANCE USE FACILITY | End: 2022-09-27
Attending: EMERGENCY MEDICINE
Payer: MEDICARE

## 2022-09-27 VITALS
SYSTOLIC BLOOD PRESSURE: 110 MMHG | OXYGEN SATURATION: 96 % | WEIGHT: 293 LBS | HEIGHT: 66 IN | TEMPERATURE: 97.6 F | DIASTOLIC BLOOD PRESSURE: 74 MMHG | HEART RATE: 69 BPM | RESPIRATION RATE: 17 BRPM | BODY MASS INDEX: 47.09 KG/M2

## 2022-09-27 DIAGNOSIS — F32.2 CURRENT SEVERE EPISODE OF MAJOR DEPRESSIVE DISORDER WITHOUT PSYCHOTIC FEATURES WITHOUT PRIOR EPISODE (HCC): Primary | ICD-10-CM

## 2022-09-27 LAB
ACETAMINOPHEN LEVEL: < 5 UG/ML (ref 0–20)
ALBUMIN SERPL-MCNC: 4.3 G/DL (ref 3.5–5.1)
ALP BLD-CCNC: 105 U/L (ref 38–126)
ALT SERPL-CCNC: 12 U/L (ref 11–66)
AMPHETAMINE+METHAMPHETAMINE URINE SCREEN: NEGATIVE
ANION GAP SERPL CALCULATED.3IONS-SCNC: 14 MEQ/L (ref 8–16)
AST SERPL-CCNC: 14 U/L (ref 5–40)
BACTERIA: ABNORMAL /HPF
BARBITURATE QUANTITATIVE URINE: NEGATIVE
BASOPHILS # BLD: 0.3 %
BASOPHILS ABSOLUTE: 0 THOU/MM3 (ref 0–0.1)
BENZODIAZEPINE QUANTITATIVE URINE: NEGATIVE
BILIRUB SERPL-MCNC: 0.3 MG/DL (ref 0.3–1.2)
BILIRUBIN DIRECT: < 0.2 MG/DL (ref 0–0.3)
BILIRUBIN URINE: NEGATIVE
BLOOD, URINE: NEGATIVE
BUN BLDV-MCNC: 11 MG/DL (ref 7–22)
CALCIUM SERPL-MCNC: 9.6 MG/DL (ref 8.5–10.5)
CANNABINOID QUANTITATIVE URINE: NEGATIVE
CASTS 2: ABNORMAL /LPF
CASTS UA: ABNORMAL /LPF
CHARACTER, URINE: CLEAR
CHLORIDE BLD-SCNC: 103 MEQ/L (ref 98–111)
CO2: 22 MEQ/L (ref 23–33)
COCAINE METABOLITE QUANTITATIVE URINE: NEGATIVE
COLOR: YELLOW
CREAT SERPL-MCNC: 0.8 MG/DL (ref 0.4–1.2)
CRYSTALS, UA: ABNORMAL
EKG ATRIAL RATE: 82 BPM
EKG P AXIS: 58 DEGREES
EKG P-R INTERVAL: 142 MS
EKG Q-T INTERVAL: 382 MS
EKG QRS DURATION: 82 MS
EKG QTC CALCULATION (BAZETT): 446 MS
EKG R AXIS: 7 DEGREES
EKG T AXIS: 48 DEGREES
EKG VENTRICULAR RATE: 82 BPM
EOSINOPHIL # BLD: 1.7 %
EOSINOPHILS ABSOLUTE: 0.2 THOU/MM3 (ref 0–0.4)
EPITHELIAL CELLS, UA: ABNORMAL /HPF
ERYTHROCYTE [DISTWIDTH] IN BLOOD BY AUTOMATED COUNT: 13.8 % (ref 11.5–14.5)
ERYTHROCYTE [DISTWIDTH] IN BLOOD BY AUTOMATED COUNT: 46.3 FL (ref 35–45)
ETHYL ALCOHOL, SERUM: < 0.01 %
GFR SERPL CREATININE-BSD FRML MDRD: 75 ML/MIN/1.73M2
GLUCOSE BLD-MCNC: 116 MG/DL (ref 70–108)
GLUCOSE URINE: NEGATIVE MG/DL
HCT VFR BLD CALC: 45.4 % (ref 37–47)
HEMOGLOBIN: 15.4 GM/DL (ref 12–16)
IMMATURE GRANS (ABS): 0.06 THOU/MM3 (ref 0–0.07)
IMMATURE GRANULOCYTES: 0.5 %
KETONES, URINE: NEGATIVE
LEUKOCYTE ESTERASE, URINE: ABNORMAL
LIPASE: 27.1 U/L (ref 5.6–51.3)
LYMPHOCYTES # BLD: 14.2 %
LYMPHOCYTES ABSOLUTE: 1.9 THOU/MM3 (ref 1–4.8)
MAGNESIUM: 2 MG/DL (ref 1.6–2.4)
MCH RBC QN AUTO: 30.7 PG (ref 26–33)
MCHC RBC AUTO-ENTMCNC: 33.9 GM/DL (ref 32.2–35.5)
MCV RBC AUTO: 90.4 FL (ref 81–99)
MISCELLANEOUS 2: ABNORMAL
MONOCYTES # BLD: 7.2 %
MONOCYTES ABSOLUTE: 1 THOU/MM3 (ref 0.4–1.3)
NITRITE, URINE: NEGATIVE
NUCLEATED RED BLOOD CELLS: 0 /100 WBC
OPIATES, URINE: NEGATIVE
OSMOLALITY CALCULATION: 277.9 MOSMOL/KG (ref 275–300)
OXYCODONE: NEGATIVE
PH UA: 6 (ref 5–9)
PHENCYCLIDINE QUANTITATIVE URINE: NEGATIVE
PLATELET # BLD: 284 THOU/MM3 (ref 130–400)
PMV BLD AUTO: 9.6 FL (ref 9.4–12.4)
POTASSIUM SERPL-SCNC: 3.9 MEQ/L (ref 3.5–5.2)
PREGNANCY, SERUM: NEGATIVE
PROTEIN UA: NEGATIVE
RBC # BLD: 5.02 MILL/MM3 (ref 4.2–5.4)
RBC URINE: ABNORMAL /HPF
RENAL EPITHELIAL, UA: ABNORMAL
SALICYLATE, SERUM: < 0.3 MG/DL (ref 2–10)
SARS-COV-2, NAAT: NOT  DETECTED
SEG NEUTROPHILS: 76.1 %
SEGMENTED NEUTROPHILS ABSOLUTE COUNT: 10.1 THOU/MM3 (ref 1.8–7.7)
SODIUM BLD-SCNC: 139 MEQ/L (ref 135–145)
SPECIFIC GRAVITY, URINE: 1.02 (ref 1–1.03)
TOTAL PROTEIN: 7.3 G/DL (ref 6.1–8)
TSH SERPL DL<=0.05 MIU/L-ACNC: 2.11 UIU/ML (ref 0.4–4.2)
UROBILINOGEN, URINE: 0.2 EU/DL (ref 0–1)
WBC # BLD: 13.3 THOU/MM3 (ref 4.8–10.8)
WBC UA: ABNORMAL /HPF
YEAST: ABNORMAL

## 2022-09-27 PROCEDURE — 87635 SARS-COV-2 COVID-19 AMP PRB: CPT

## 2022-09-27 PROCEDURE — 99285 EMERGENCY DEPT VISIT HI MDM: CPT

## 2022-09-27 PROCEDURE — 80143 DRUG ASSAY ACETAMINOPHEN: CPT

## 2022-09-27 PROCEDURE — 93010 ELECTROCARDIOGRAM REPORT: CPT | Performed by: NUCLEAR MEDICINE

## 2022-09-27 PROCEDURE — 80179 DRUG ASSAY SALICYLATE: CPT

## 2022-09-27 PROCEDURE — 85025 COMPLETE CBC W/AUTO DIFF WBC: CPT

## 2022-09-27 PROCEDURE — 84703 CHORIONIC GONADOTROPIN ASSAY: CPT

## 2022-09-27 PROCEDURE — 81001 URINALYSIS AUTO W/SCOPE: CPT

## 2022-09-27 PROCEDURE — 84443 ASSAY THYROID STIM HORMONE: CPT

## 2022-09-27 PROCEDURE — 82077 ASSAY SPEC XCP UR&BREATH IA: CPT

## 2022-09-27 PROCEDURE — 93005 ELECTROCARDIOGRAM TRACING: CPT | Performed by: EMERGENCY MEDICINE

## 2022-09-27 PROCEDURE — 83735 ASSAY OF MAGNESIUM: CPT

## 2022-09-27 PROCEDURE — 36415 COLL VENOUS BLD VENIPUNCTURE: CPT

## 2022-09-27 PROCEDURE — 82248 BILIRUBIN DIRECT: CPT

## 2022-09-27 PROCEDURE — 80307 DRUG TEST PRSMV CHEM ANLYZR: CPT

## 2022-09-27 PROCEDURE — 80053 COMPREHEN METABOLIC PANEL: CPT

## 2022-09-27 PROCEDURE — 83690 ASSAY OF LIPASE: CPT

## 2022-09-27 ASSESSMENT — ENCOUNTER SYMPTOMS
CONSTIPATION: 0
BLOOD IN STOOL: 0
TROUBLE SWALLOWING: 0
VOICE CHANGE: 0
EYE ITCHING: 0
WHEEZING: 0
BACK PAIN: 0
ABDOMINAL PAIN: 0
CHOKING: 0
SHORTNESS OF BREATH: 0
DIARRHEA: 0
EYE REDNESS: 0
EYE DISCHARGE: 0
COUGH: 0
CHEST TIGHTNESS: 0
ABDOMINAL DISTENTION: 0
NAUSEA: 0
RHINORRHEA: 0
PHOTOPHOBIA: 0
EYE PAIN: 0
SINUS PRESSURE: 0
VOMITING: 0
SORE THROAT: 0

## 2022-09-27 ASSESSMENT — PAIN - FUNCTIONAL ASSESSMENT
PAIN_FUNCTIONAL_ASSESSMENT: NONE - DENIES PAIN
PAIN_FUNCTIONAL_ASSESSMENT: NONE - DENIES PAIN

## 2022-09-27 ASSESSMENT — SLEEP AND FATIGUE QUESTIONNAIRES
AVERAGE NUMBER OF SLEEP HOURS: 4
DO YOU USE A SLEEP AID: NO
DO YOU HAVE DIFFICULTY SLEEPING: NO

## 2022-09-27 NOTE — ED NOTES
Left arm laceration cleaned and dressed at this time. Patient tolerated well. Patient resting in bed. Respirations easy and unlabored. No distress noted. Call light within reach. Patient remains in ligature resistant room with constant observation.       Christine Bowers RN  09/27/22 8936

## 2022-09-27 NOTE — ED NOTES
Patient resting on right side with eyes closed, breathing easy and unlabored. Call light within reach. Continuous supervision maintained. No new concerns voiced.       Chad Romero RN  09/27/22 0373

## 2022-09-27 NOTE — LETTER
[facility]  [address] [facility] [facility]. [facility] [address]  Phone: [phone]  Fax: [phone]    KANCHAN Lu CNP        September 30, 2022    [facility] [address]  [address] [place] [address]      Dear [employer]:    We have made several attempts to contact you by phone and have   been unsuccessful. Please call our office at your earliest convenience  At [phone] opt [phone]. Thank you.      Sincerely,      KANCHAN Lu CNP

## 2022-09-27 NOTE — ED NOTES
Report given to [facility] at [facility] 90 pickup 0800     Brian Huitron RN  09/27/22 3743

## 2022-09-27 NOTE — TELEPHONE ENCOUNTER
Please let pt know we can not continue his testosterone injections here locally. Does he have another avenue for this such as can he continue to see his current provider in [place] who was prescribing the testosterone or the CNP at his longterm house?   Thanks

## 2022-09-27 NOTE — ED PROVIDER NOTES
[facility]  eMERGENCY dEPARTMENT eNCOUnter          CHIEF COMPLAINT       Chief Complaint   Patient presents with    Suicidal    Laceration     Left forearm laceration       Nurses Notes reviewed and I agree except as noted in the HPI. HISTORY OF PRESENT ILLNESS    Norris Walker is a 45 y.o. adult female to male transgender male who presents with suicidal ideation and left forearm laceration. Patient states that they are \"emotionally drained\" and has had the urge to cut themself for the past two days. Patient has pertinent past medical history of bipolar disorder, drug and alcohol abuse, and depression. Patient is currently not taking medications and recently walked out of [place] drug rehab program. Patient denies using drugs or alcohol at this time, but admits to a history of cutting herself. Currently, patient has a flat affect and doesn't feel like talking to anyone. REVIEW OF SYSTEMS     Review of Systems   Constitutional:  Positive for fatigue. Negative for activity change, appetite change, diaphoresis and unexpected weight change. HENT:  Negative for congestion, ear discharge, ear pain, hearing loss, rhinorrhea, sinus pressure, sore throat, trouble swallowing and voice change. Eyes:  Negative for photophobia, pain, discharge, redness and itching. Respiratory:  Negative for cough, choking, chest tightness, shortness of breath and wheezing. Cardiovascular:  Negative for chest pain, palpitations and leg swelling. Gastrointestinal:  Negative for abdominal distention, abdominal pain, blood in stool, constipation, diarrhea, nausea and vomiting. Endocrine: Negative for polydipsia, polyphagia and polyuria. Genitourinary:  Negative for decreased urine volume, difficulty urinating, dysuria, enuresis, frequency, hematuria and urgency. Musculoskeletal:  Negative for arthralgias, back pain, gait problem, myalgias, neck pain and neck stiffness.    Skin:  Negative for pallor and rash.   Allergic/Immunologic: Negative for immunocompromised state. Neurological:  Negative for dizziness, tremors, seizures, syncope, facial asymmetry, weakness, light-headedness, numbness and headaches. Hematological:  Negative for adenopathy. Does not bruise/bleed easily. Psychiatric/Behavioral:  Positive for dysphoric mood, self-injury and suicidal ideas. Negative for agitation and hallucinations. The patient is not nervous/anxious. PAST MEDICAL HISTORY    has a past medical history of Cocaine abuse ([facility] [address] SURGICAL HISTORY      has a past surgical history that includes Salpingo-oophorectomy and Dilation and curettage of uterus. CURRENT MEDICATIONS       Previous Medications    ALBUTEROL SULFATE (PROAIR RESPICLICK) 136 (90 BASE) MCG/ACT AEROSOL POWDER INHALATION    Inhale 2 puffs into the lungs every 4 hours as needed for Wheezing or Shortness of Breath    ATORVASTATIN (LIPITOR) 20 MG TABLET    Take 1 tablet by mouth nightly    BACLOFEN (LIORESAL) 10 MG TABLET    Take 10 mg by mouth 3 times daily    BACLOFEN (LIORESAL) 10 MG/20ML SOLN    25 mcg by Intrathecal route once    BENZTROPINE (COGENTIN) 0.5 MG TABLET    Take 1 tablet by mouth 2 times daily    DIVALPROEX (DEPAKOTE ER) 500 MG EXTENDED RELEASE TABLET        FUROSEMIDE (LASIX) 20 MG TABLET    Take 1 tablet by mouth daily    HYDROXYZINE HCL (ATARAX) 25 MG TABLET    Take 25 mg by mouth 3 times daily as needed for Itching    MELOXICAM (MOBIC) 15 MG TABLET    Take 15 mg by mouth daily as needed    MULTIPLE VITAMINS-MINERALS (THERAPEUTIC MULTIVITAMIN-MINERALS) TABLET    Take 1 tablet by mouth daily    RISPERIDONE (RISPERDAL) 1 MG TABLET    Take 1 tablet by mouth 2 times daily    TESTOSTERONE CYPIONATE (DEPOTESTOTERONE CYPIONATE) 200 MG/ML INJECTION    Inject 200 mg into the muscle every 14 days.        ALLERGIES     is allergic to bactrim [sulfamethoxazole-trimethoprim], haldol [haloperidol], pcn [penicillins], sulfa antibiotics, and tegretol [carbamazepine]. FAMILY HISTORY     has no family status information on file. family history is not on file. SOCIAL HISTORY      reports that he has been smoking cigarettes. He has never used smokeless tobacco. He reports that he does not currently use alcohol. Drug: Cocaine. PHYSICAL EXAM     INITIAL VITALS:  height is 5' 6\" (1.676 m) and weight is 304 lb (137.9 kg) (abnormal). His oral temperature is 97.6 °F (36.4 °C). His blood pressure is 146/94 (abnormal) and his pulse is 76. His respiration is 16 and oxygen saturation is 94%. Physical Exam  Vitals and nursing note reviewed. Constitutional:       General: He is not in acute distress. Appearance: He is well-developed. He is not diaphoretic. HENT:      Head: Normocephalic and atraumatic. Right Ear: External ear normal.      Left Ear: External ear normal.      Nose: Nose normal.      Mouth/Throat:      Pharynx: No oropharyngeal exudate. Eyes:      General: No scleral icterus. Right eye: No discharge. Left eye: No discharge. Conjunctiva/sclera: Conjunctivae normal.      Pupils: Pupils are equal, round, and reactive to light. Neck:      Thyroid: No thyromegaly. Vascular: No JVD. Trachea: No tracheal deviation. Cardiovascular:      Rate and Rhythm: Normal rate and regular rhythm. Heart sounds: Normal heart sounds, S1 normal and S2 normal. No murmur heard. No friction rub. No gallop. Pulmonary:      Effort: Pulmonary effort is normal.      Breath sounds: Normal breath sounds. No stridor. No wheezing, rhonchi or rales. Chest:      Chest wall: No tenderness. Abdominal:      General: Bowel sounds are normal. There is no distension. Palpations: Abdomen is soft. There is no mass. Tenderness: There is no abdominal tenderness. There is no guarding or rebound. Hernia: No hernia is present. Musculoskeletal:         General: No tenderness. Normal range of motion.       Cervical back: Normal range of motion and neck supple. Lymphadenopathy:      Cervical: No cervical adenopathy. Skin:     General: Skin is warm and dry. Capillary Refill: Capillary refill takes less than 2 seconds. Findings: Lesion (left forearm laceration) present. No bruising, ecchymosis or rash. Neurological:      General: No focal deficit present. Mental Status: He is alert and oriented to person, place, and time. Mental status is at baseline. Cranial Nerves: No cranial nerve deficit. Coordination: Coordination normal.      Deep Tendon Reflexes: Reflexes are normal and symmetric. Psychiatric:         Speech: Speech normal.         Behavior: Behavior normal.         Thought Content: Thought content normal.         Judgment: Judgment normal.         DIFFERENTIAL DIAGNOSIS:   Makenzie ideation, schizoaffective disorder, substance-induced mood disorder    DIAGNOSTIC RESULTS     EKG: All EKG's are interpreted by the Emergency Department Physician who either signs or Co-signs this chart in the absence of a cardiologist.  EKG reveals normal sinus rhythm, normal axis, ventricular rate of 82 MT interval 142 QRS duration of 82 QT interval 382 QTC of 446. RADIOLOGY: non-plain film images(s) such as CT, Ultrasound and MRI are read by the radiologist.  None.     LABS:   Labs Reviewed   CBC WITH AUTO DIFFERENTIAL - Abnormal; Notable for the following components:       Result Value    WBC 13.3 (*)     RDW-SD 46.3 (*)     Segs Absolute 10.1 (*)     All other components within normal limits   BASIC METABOLIC PANEL - Abnormal; Notable for the following components:    CO2 22 (*)     Glucose 116 (*)     All other components within normal limits   SALICYLATE LEVEL - Abnormal; Notable for the following components:    Salicylate, Serum < 0.3 (*)     All other components within normal limits   URINE WITH REFLEXED MICRO - Abnormal; Notable for the following components:    Leukocyte Esterase, Urine TRACE (*)     All other components within normal limits   GLOMERULAR FILTRATION RATE, ESTIMATED - Abnormal; Notable for the following components:    Est, Glom Filt Rate 75 (*)     All other components within normal limits   COVID-19, RAPID   HEPATIC FUNCTION PANEL   LIPASE   MAGNESIUM   HCG, SERUM, QUALITATIVE   TSH   ETHANOL   ACETAMINOPHEN LEVEL   URINE DRUG SCREEN   OSMOLALITY   ANION GAP       EMERGENCY DEPARTMENT COURSE:   Vitals:    Vitals:    09/27/22 0027 09/27/22 0032 09/27/22 0337   BP:  (!) 121/93 (!) 146/94   Pulse: 92  76   Resp: 18  16   Temp: 97.6 °F (36.4 °C)     TempSrc: Oral     SpO2: 96%  94%   Weight: (!) 304 lb (137.9 kg)     Height: 5' 6\" (1.676 m)       Patient was assessed at bedside labs and imaging ordered. ALTON was consulted. Patient has been placed under an [address] by [facility]. Here today I reviewed all labs and imaging. They were all within normal limits. Patient is cleared medically from current psychiatric complaint. Here today, there are no beds for the patient here. Patient will need to be transferred. Patient remained stable throughout course. Patient is signed out to my morning colleague in stable condition. CRITICAL CARE:   None    CONSULTS:  ALTON    PROCEDURES:  None    FINAL IMPRESSION      1. Current severe episode of major depressive disorder without psychotic features without prior episode ([facility] [address]          DISPOSITION/PLAN   ED observation/transfer    PATIENT REFERRED TO:  No follow-up provider specified.     DISCHARGE MEDICATIONS:  New Prescriptions    No medications on file       (Please note that portions of this note were completed with a voice recognition program.  Efforts were made to edit the dictations but occasionally words are mis-transcribed.)    Luis An, DO            Lewie Bosworth, DO  09/27/22 [id number] [facility], DO  09/27/22 1922

## 2022-09-27 NOTE — ED TRIAGE NOTES
Patient presents to the ED via [facility] with chief complaint of suicidal thoughts. Patient states that she is \"emotionally drained\" Police stated they received a call for a well fare check to the patients house made by the patient's . Patient has a laceration on left forearm. Bleeding controlled. Patient stated she cut herself in attempt to hurt herself. Patient placed in safe room that is ligature resistant with continuous monitoring in place. Provider notified, requested an assessment by behavioral health . Patient belongings secured in a locked lockers outside of the room. Explained suicide prevention precautions to the patient including constant observer.

## 2022-11-04 ENCOUNTER — TELEPHONE (OUTPATIENT)
Dept: FAMILY MEDICINE CLINIC | Age: 52
End: 2022-11-04

## 2022-11-04 NOTE — TELEPHONE ENCOUNTER
Tried to call patient to let him know we had to cancel his appointment on 12/20/22, due to the provider being out of the office. Patient does not have a working phone number so sent letter with new appointment on 11/4/2022.

## 2024-11-20 ENCOUNTER — HOSPITAL ENCOUNTER (EMERGENCY)
Facility: HOSPITAL | Age: 54
Discharge: PSYCHIATRIC OR SUBSTANCE USE FACILITY | End: 2024-11-20
Payer: MEDICARE

## 2024-11-20 ENCOUNTER — APPOINTMENT (OUTPATIENT)
Dept: CARDIOLOGY | Facility: HOSPITAL | Age: 54
End: 2024-11-20
Payer: MEDICARE

## 2024-11-20 ENCOUNTER — APPOINTMENT (OUTPATIENT)
Dept: RADIOLOGY | Facility: HOSPITAL | Age: 54
End: 2024-11-20
Payer: MEDICARE

## 2024-11-20 VITALS
BODY MASS INDEX: 40.18 KG/M2 | TEMPERATURE: 98.6 F | SYSTOLIC BLOOD PRESSURE: 100 MMHG | OXYGEN SATURATION: 98 % | HEIGHT: 66 IN | RESPIRATION RATE: 18 BRPM | WEIGHT: 250 LBS | DIASTOLIC BLOOD PRESSURE: 64 MMHG | HEART RATE: 80 BPM

## 2024-11-20 DIAGNOSIS — R45.851 SUICIDAL IDEATIONS: Primary | ICD-10-CM

## 2024-11-20 LAB
ALBUMIN SERPL BCP-MCNC: 3.5 G/DL (ref 3.4–5)
ALP SERPL-CCNC: 74 U/L (ref 33–110)
ALT SERPL W P-5'-P-CCNC: 11 U/L (ref 7–45)
AMPHETAMINES UR QL SCN: ABNORMAL
ANION GAP SERPL CALCULATED.3IONS-SCNC: 11 MMOL/L (ref 10–20)
APAP SERPL-MCNC: <10 UG/ML
APPEARANCE UR: CLEAR
AST SERPL W P-5'-P-CCNC: 11 U/L (ref 9–39)
B-HCG SERPL-ACNC: <2 MIU/ML
BARBITURATES UR QL SCN: ABNORMAL
BASOPHILS # BLD AUTO: 0.04 X10*3/UL (ref 0–0.1)
BASOPHILS NFR BLD AUTO: 0.3 %
BENZODIAZ UR QL SCN: ABNORMAL
BILIRUB SERPL-MCNC: 0.2 MG/DL (ref 0–1.2)
BILIRUB UR STRIP.AUTO-MCNC: NEGATIVE MG/DL
BUN SERPL-MCNC: 12 MG/DL (ref 6–23)
BZE UR QL SCN: ABNORMAL
CALCIUM SERPL-MCNC: 8.9 MG/DL (ref 8.6–10.3)
CANNABINOIDS UR QL SCN: ABNORMAL
CHLORIDE SERPL-SCNC: 105 MMOL/L (ref 98–107)
CO2 SERPL-SCNC: 24 MMOL/L (ref 21–32)
COLOR UR: ABNORMAL
CREAT SERPL-MCNC: 0.86 MG/DL (ref 0.5–1.05)
EGFRCR SERPLBLD CKD-EPI 2021: 80 ML/MIN/1.73M*2
EOSINOPHIL # BLD AUTO: 0.22 X10*3/UL (ref 0–0.7)
EOSINOPHIL NFR BLD AUTO: 1.5 %
ERYTHROCYTE [DISTWIDTH] IN BLOOD BY AUTOMATED COUNT: 13.6 % (ref 11.5–14.5)
ETHANOL SERPL-MCNC: <10 MG/DL
FENTANYL+NORFENTANYL UR QL SCN: ABNORMAL
GLUCOSE SERPL-MCNC: 92 MG/DL (ref 74–99)
GLUCOSE UR STRIP.AUTO-MCNC: NORMAL MG/DL
HCT VFR BLD AUTO: 41.7 % (ref 36–46)
HGB BLD-MCNC: 14 G/DL (ref 12–16)
IMM GRANULOCYTES # BLD AUTO: 0.05 X10*3/UL (ref 0–0.7)
IMM GRANULOCYTES NFR BLD AUTO: 0.3 % (ref 0–0.9)
KETONES UR STRIP.AUTO-MCNC: NEGATIVE MG/DL
LEUKOCYTE ESTERASE UR QL STRIP.AUTO: ABNORMAL
LYMPHOCYTES # BLD AUTO: 1.96 X10*3/UL (ref 1.2–4.8)
LYMPHOCYTES NFR BLD AUTO: 13.6 %
MCH RBC QN AUTO: 30.4 PG (ref 26–34)
MCHC RBC AUTO-ENTMCNC: 33.6 G/DL (ref 32–36)
MCV RBC AUTO: 91 FL (ref 80–100)
METHADONE UR QL SCN: ABNORMAL
MONOCYTES # BLD AUTO: 1.29 X10*3/UL (ref 0.1–1)
MONOCYTES NFR BLD AUTO: 9 %
NEUTROPHILS # BLD AUTO: 10.81 X10*3/UL (ref 1.2–7.7)
NEUTROPHILS NFR BLD AUTO: 75.3 %
NITRITE UR QL STRIP.AUTO: NEGATIVE
NRBC BLD-RTO: 0 /100 WBCS (ref 0–0)
OPIATES UR QL SCN: ABNORMAL
OXYCODONE+OXYMORPHONE UR QL SCN: ABNORMAL
PCP UR QL SCN: ABNORMAL
PH UR STRIP.AUTO: 6 [PH]
PLATELET # BLD AUTO: 265 X10*3/UL (ref 150–450)
POTASSIUM SERPL-SCNC: 3.9 MMOL/L (ref 3.5–5.3)
PROT SERPL-MCNC: 6.3 G/DL (ref 6.4–8.2)
PROT UR STRIP.AUTO-MCNC: NEGATIVE MG/DL
RBC # BLD AUTO: 4.61 X10*6/UL (ref 4–5.2)
RBC # UR STRIP.AUTO: NEGATIVE /UL
RBC #/AREA URNS AUTO: ABNORMAL /HPF
SALICYLATES SERPL-MCNC: <3 MG/DL
SODIUM SERPL-SCNC: 136 MMOL/L (ref 136–145)
SP GR UR STRIP.AUTO: 1.01
SQUAMOUS #/AREA URNS AUTO: ABNORMAL /HPF
UROBILINOGEN UR STRIP.AUTO-MCNC: NORMAL MG/DL
WBC # BLD AUTO: 14.4 X10*3/UL (ref 4.4–11.3)
WBC #/AREA URNS AUTO: ABNORMAL /HPF

## 2024-11-20 PROCEDURE — 36415 COLL VENOUS BLD VENIPUNCTURE: CPT

## 2024-11-20 PROCEDURE — 84075 ASSAY ALKALINE PHOSPHATASE: CPT

## 2024-11-20 PROCEDURE — 85025 COMPLETE CBC W/AUTO DIFF WBC: CPT

## 2024-11-20 PROCEDURE — 99285 EMERGENCY DEPT VISIT HI MDM: CPT | Mod: 25

## 2024-11-20 PROCEDURE — 84702 CHORIONIC GONADOTROPIN TEST: CPT

## 2024-11-20 PROCEDURE — 73090 X-RAY EXAM OF FOREARM: CPT | Mod: LEFT SIDE | Performed by: RADIOLOGY

## 2024-11-20 PROCEDURE — 87086 URINE CULTURE/COLONY COUNT: CPT | Mod: WESLAB

## 2024-11-20 PROCEDURE — 81001 URINALYSIS AUTO W/SCOPE: CPT

## 2024-11-20 PROCEDURE — 80143 DRUG ASSAY ACETAMINOPHEN: CPT

## 2024-11-20 PROCEDURE — 93005 ELECTROCARDIOGRAM TRACING: CPT

## 2024-11-20 PROCEDURE — 73090 X-RAY EXAM OF FOREARM: CPT | Mod: LT

## 2024-11-20 PROCEDURE — 80307 DRUG TEST PRSMV CHEM ANLYZR: CPT

## 2024-11-20 PROCEDURE — 90839 PSYTX CRISIS INITIAL 60 MIN: CPT

## 2024-11-20 SDOH — HEALTH STABILITY: MENTAL HEALTH: IN THE PAST FEW WEEKS, HAVE YOU FELT THAT YOU OR YOUR FAMILY WOULD BE BETTER OFF IF YOU WERE DEAD?: YES

## 2024-11-20 SDOH — HEALTH STABILITY: MENTAL HEALTH
HAVE YOU STARTED TO WORK OUT OR WORKED OUT THE DETAILS OF HOW TO KILL YOURSELF? DO YOU INTENT TO CARRY OUT THIS PLAN?: NO

## 2024-11-20 SDOH — HEALTH STABILITY: MENTAL HEALTH: HOW DID YOU TRY TO KILL YOURSELF?: OVERDOSE

## 2024-11-20 SDOH — HEALTH STABILITY: MENTAL HEALTH: CONTENT: UNREMARKABLE

## 2024-11-20 SDOH — HEALTH STABILITY: MENTAL HEALTH: IN THE PAST FEW WEEKS, HAVE YOU WISHED YOU WERE DEAD?: YES

## 2024-11-20 SDOH — HEALTH STABILITY: MENTAL HEALTH: WISH TO BE DEAD (PAST 1 MONTH): YES

## 2024-11-20 SDOH — HEALTH STABILITY: MENTAL HEALTH: FOR HIGH RISK PATIENTS: ALL INTERVENTIONS ABOVE, PLUS:;1:1 PATIENT OBSERVER AT ALL TIMES

## 2024-11-20 SDOH — HEALTH STABILITY: MENTAL HEALTH: ACTIVE SUICIDAL IDEATION WITH SOME INTENT TO ACT, WITHOUT SPECIFIC PLAN (PAST 1 MONTH): YES

## 2024-11-20 SDOH — HEALTH STABILITY: MENTAL HEALTH: HAVE YOU EVER TRIED TO KILL YOURSELF?: YES

## 2024-11-20 SDOH — HEALTH STABILITY: MENTAL HEALTH: HAVE YOU EVER DONE ANYTHING, STARTED TO DO ANYTHING, OR PREPARED TO DO ANYTHING TO END YOUR LIFE?: NO

## 2024-11-20 SDOH — HEALTH STABILITY: MENTAL HEALTH: ARE YOU HAVING THOUGHTS OF KILLING YOURSELF RIGHT NOW?: YES

## 2024-11-20 SDOH — HEALTH STABILITY: MENTAL HEALTH: HAVE YOU ACTUALLY HAD ANY THOUGHTS OF KILLING YOURSELF?: YES

## 2024-11-20 SDOH — HEALTH STABILITY: MENTAL HEALTH: HAVE YOU WISHED YOU WERE DEAD OR WISHED YOU COULD GO TO SLEEP AND NOT WAKE UP?: YES

## 2024-11-20 SDOH — HEALTH STABILITY: MENTAL HEALTH: HAVE YOU BEEN THINKING ABOUT HOW YOU MIGHT DO THIS?: NO

## 2024-11-20 SDOH — HEALTH STABILITY: MENTAL HEALTH: BEHAVIORAL HEALTH(WDL): WITHIN DEFINED LIMITS

## 2024-11-20 SDOH — HEALTH STABILITY: MENTAL HEALTH: HAVE YOU HAD THESE THOUGHTS AND HAD SOME INTENTION OF ACTING ON THEM?: NO

## 2024-11-20 SDOH — HEALTH STABILITY: MENTAL HEALTH: ACTIVE SUICIDAL IDEATION WITH SPECIFIC PLAN AND INTENT (PAST 1 MONTH): YES

## 2024-11-20 SDOH — HEALTH STABILITY: MENTAL HEALTH: DESCRIBE YOUR THOUGHTS OF KILLING YOURSELF RIGHT NOW:: SI, PLAN TO OD ON FENTANYL, UNABLE TO GUARANTEE SAFETY

## 2024-11-20 SDOH — HEALTH STABILITY: MENTAL HEALTH: IN THE PAST WEEK, HAVE YOU BEEN HAVING THOUGHTS ABOUT KILLING YOURSELF?: YES

## 2024-11-20 SDOH — ECONOMIC STABILITY: HOUSING INSECURITY: FEELS SAFE LIVING IN HOME: YES

## 2024-11-20 SDOH — HEALTH STABILITY: MENTAL HEALTH: SUICIDE ASSESSMENT: ADULT (C-SSRS)

## 2024-11-20 SDOH — HEALTH STABILITY: MENTAL HEALTH: WHEN DID YOU TRY TO KILL YOURSELF?: 2004, 2022

## 2024-11-20 SDOH — HEALTH STABILITY: MENTAL HEALTH: NON-SPECIFIC ACTIVE SUICIDAL THOUGHTS (PAST 1 MONTH): YES

## 2024-11-20 SDOH — HEALTH STABILITY: MENTAL HEALTH: SUICIDAL BEHAVIOR (3 MONTHS): NO

## 2024-11-20 SDOH — HEALTH STABILITY: MENTAL HEALTH: FOR HIGH RISK PATIENTS: 1:1 PATIENT OBSERVER AT ALL TIMES;ALL INTERVENTIONS ABOVE, PLUS:

## 2024-11-20 SDOH — HEALTH STABILITY: MENTAL HEALTH: SUICIDAL BEHAVIOR (DESCRIPTION): OVERDOSE 2004, 2022

## 2024-11-20 SDOH — HEALTH STABILITY: MENTAL HEALTH: SUICIDAL BEHAVIOR (LIFETIME): YES

## 2024-11-20 ASSESSMENT — PAIN - FUNCTIONAL ASSESSMENT: PAIN_FUNCTIONAL_ASSESSMENT: 0-10

## 2024-11-20 ASSESSMENT — COLUMBIA-SUICIDE SEVERITY RATING SCALE - C-SSRS
1. IN THE PAST MONTH, HAVE YOU WISHED YOU WERE DEAD OR WISHED YOU COULD GO TO SLEEP AND NOT WAKE UP?: NO
6. HAVE YOU EVER DONE ANYTHING, STARTED TO DO ANYTHING, OR PREPARED TO DO ANYTHING TO END YOUR LIFE?: NO
2. HAVE YOU ACTUALLY HAD ANY THOUGHTS OF KILLING YOURSELF?: NO

## 2024-11-20 ASSESSMENT — LIFESTYLE VARIABLES
SUBSTANCE_ABUSE_PAST_12_MONTHS: YES
PRESCIPTION_ABUSE_PAST_12_MONTHS: NO

## 2024-11-20 ASSESSMENT — PAIN SCALES - GENERAL: PAINLEVEL_OUTOF10: 0 - NO PAIN

## 2024-11-20 NOTE — PROGRESS NOTES
EPAT - Social Work Psychiatric Assessment    Arrival Details  Mode of Arrival: Ambulatory  Admission Source: Other (Comment) ([facility])  Admission Type: Voluntary  EPAT Assessment Start Date: 24  EPAT Assessment Start Time:   Name of : Elodia HARRISON    History of Present Illness  Admission Reason: 52y/o transgender male (goes by Krishan, uses he/him pronouns) presented to [facility] ED BIB private vehicle from a recovery center due to SI with plan and CAH.  HPI: SW reviewed previous records and met with patient to obtain history. Patient lives in [place], and came to the [facility] today to admit to [facility] for residential treatment for cocaine use. After admission, patient verbalized SI with plan to overdose on fentanyl to staff. Patient was brought here from the treatment center for psych eval. Patient with hx schizoaffective disorder and cocaine abuse. Patient lived in the [place] area in the past, and had many admits for psych while here, however moved to the [facility] 5 years ago. Patient states he is active with mental health providers in [place], but has been noncompliant with his psychiatric medications for the past 2 months. Patient with 20-year hx several inpatient and outpatient substance abuse treatment programs, and states longest period of sobriety was 1 year and 26 days in [id number].    ALMA ROSA Readmission Information   Readmission within 30 Days: No         Psychosis Symptoms  Hallucination Type: Auditory, Command (Patient reports CAH of his mother's voice telling him to join her (mother is ))         Past Psychiatric History/Meds/Treatments  Past Psychiatric History: Hx of schizoaffective disorder and cocaine use disorder. Patient is active with providers at [facility] in [place] for medication management. Patient reports 2 previous suicide attempts in the past, most recently 2 years ago by overdose. Patient with hx SIB by cutting. Patient  superficially cut his forearm on Monday; states prior to this, he had not self-harmed for around 8 months. Patient admits to noncompliance with his psych meds for the past 2 months.  Past Psychiatric Meds/Treatments: Patient reports numerous past psychiatric admissions, too many to count. Believes it is over 20. Patient also with some residential and outpatient substance abuse treatment history.    Current Mental Health Contacts   Name/Phone Number: None  Provider Name/Phone Number: [facility] in [place]  Provider Last Appointment Date: several months ago; is supposed to have appointments on a monthly basis    Support System: Immediate family, Friends (dad, friend)    Living Arrangement: Apartment, Lives alone    Home Safety  Feels Safe Living in Home: Yes                        Drug Screening  Have you used any substances (canabis, cocaine, heroin, hallucinogens, inhalants, etc.) in the past 12 months?: Yes (Patient reports daily cocaine use, with last use 3 days ago.)  Have you used any prescription drugs other than prescribed in the past 12 months?: No  Is a toxicology screen needed?: Yes    Stage of Change  Stage of Change: Action  History of Treatment: Dual, IOP, Sober living  Duration of Substance Use: Patient reports 20-year history of crack-cocaine abuse. Reports using daily by smoking, amount varies. Patient reports last use was 3 days ago.    Behavioral Health  Behavioral Health(WDL): Within Defined Limits    Orientation  Orientation Level: Appropriate for developmental age    General Appearance  Motor Activity: Unremarkable  Speech Pattern: Excessively soft  General Attitude: Cooperative  Appearance/Hygiene: Unremarkable    Thought Process  Coherency:  (Unremarkable)  Content: Unremarkable  Delusions:  (None reported/noted)  Perception: Not altered  Hallucination: Other (Comment) (Patient denies current AVH at time of SW eval)  Judgment/Insight: Other (Comment) (fair)  Confusion:  "None  Cognition: Appropriate for developmental age    Sleep Pattern  Sleep Pattern: Difficulty falling asleep, Insomnia (Patient reports being unable to sleep for the past 3 days, since stopping use of cocaine.)    Risk Factors  Self Harm/Suicidal Ideation Plan: Patient verbalizes current SI with plan to overdose on fentanyl. States he has access to fentanyl via drug dealers. Has been having worsening SI over the past 1 week. Patient stating he does not want to kill himself, but also currently doesn't trust himself to \"not do something stupid\" due to his current symptoms.  Previous Self Harm/Suicidal Plans: Patient reports long hx SIB by cutting, and has several scars on left forearm. Last cut 2 days ago. Patient has attempted suicide by overdose twice in the past, once in 2004, and once around 2 years ago. States overdose in 2004 was on 9,000mg of his rx seroquel. States he didn't call emergency services or go to a hospital after 2022 admit, and later woke up in his apartment. \"I didn't take enough, I guess\".  Risk Factors: Command hallucinations, Major mental illness, Male, Substance abuse    Violence Risk Assessment  Assessment of Violence: None noted  Thoughts of Harm to Others: No    Ability to Assess Risk Screen  Risk Screen - Ability to Assess: Able to be screened  Ask Suicide-Screening Questions  1. In the past few weeks, have you wished you were dead?: Yes  2. In the past few weeks, have you felt that you or your family would be better off if you were dead?: Yes  3. In the past week, have you been having thoughts about killing yourself?: Yes  4. Have you ever tried to kill yourself?: Yes  How did you try to kill yourself?: overdose  When did you try to kill yourself?: 2004, 2022  5. Are you having thoughts of killing yourself right now?: Yes  Describe your thoughts of killing yourself right now:: SI, plan to OD on fentanyl, unable to guarantee safety  Calculated Risk Score: Imminent Risk  [place] Suicide " "Severity Rating Scale (Screener/Recent Self-Report)  1. Wish to be Dead (Past 1 Month): Yes  2. Non-Specific Active Suicidal Thoughts (Past 1 Month): Yes  3. Active Suicidal Ideation with any Methods (Not Plan) Without Intent to Act (Past 1 Month): Yes  4. Active Suicidal Ideation with Some Intent to Act, Without Specific Plan (Past 1 Month): Yes  5. Active Suicidal Ideation with Specific Plan and Intent (Past 1 Month): Yes  6. Suicidal Behavior (Lifetime): Yes  6. Suicidal Behavior (3 Months): No  6. Suicidal Behavior (Description): overdose ,   Calculated C-SSRS Risk Score (Lifetime/Recent): High Risk  Step 1: Risk Factors  Current & Past Psychiatric Dx: Psychotic disorder, Mood disorder, Alcohol/substance abuse disorders  Presenting Symptoms: Anhedonia, Anxiety and/or panic, Psychosis, Command hallucinations  Family History: Suicide (Patient's mother  by suicide 30 years ago. She also struggled with substance abuse.)  Step 2: Protective Factors   Protective Factors External: Cultural, spiritual and/or moral attitudes against suicide (Patient states he does not want his father to have to deal with his child's death.)    Psychiatric Impression and Plan of Care  Assessment and Plan: SW met FTF with patient to complete eval. Patient A&Ox4, with flat affect. Cooperative with questions asked, engaged in interview. States that he decided to go to a 30-day residential program for help with his cocaine use disorder. States he is \"sick of it\". Patient lives in [place], but came to [place] today to enter [facility] for 30-day treatment. States they wanted him to stabilize his mental health before attempting treatment. Patient verbalizes current SI with plan to overdose on fentanyl. When asked about intent, patient states his father is a protective factor, but doesn't know if he can maintain his safety. States SI thoughts have been increasing in frequency and intensity over the past week. Patient also " "has not been compliant with his psych meds for around the past 2 months. Patient reports CAH beginning this afternoon, of his mother's voice telling him to \"join her\". Mother is ,  by suicide 30 years ago. Denied HI/VH. Patient reports no sleep for the past 3 days, which he attributes to stopping his drug use. Patient also reports excess sweating earlier today, but states he has always had that when stopping cocaine use in the past.  Specific Resources Provided to Patient: Peer support unavailable  Plan Comments: SW discussed case with attending provider. Patient presenting as imminent risk to self with SI and plan, as well as lack of ability to safety plan. Recommended inpatient psychiatric placement for safety and stabilization.    Outcome/Disposition  Patient's Perception of Outcome Achieved: in agreement  Assessment, Recommendations and Risk Level Reviewed with: KATHY Walker  Contact Name: n/a  EPAT Assessment Completed Date: 24  EPAT Assessment Completed Time:   Patient Disposition: Out of network facility (Specify)  Out of Network Reason: Patient requires dual diagnosis treatment      "

## 2024-11-20 NOTE — ED PROVIDER NOTES
HPI   Chief Complaint   Patient presents with    Suicidal     Si with plan to overdose on fentynl.        Patient is a 53-year-old female presenting to the emergency department for evaluation of suicidal ideation.  Patient states she has had increased thoughts of harming herself for the past few days.  She states she has been thinking about wanting to be with her mom who is dead.  She states her plan is to overdose on fentanyl.  She does admit to a history of drug abuse mainly using cocaine.  She states the last time she used was 2 days ago.  She states she went to a drug and alcohol center and they recommended that she get help with her suicidal ideations and then that she can come back to the drug and alcohol center.  Patient also admits to cutting herself a few days ago.  She has a cut noted to her left dorsal wrist.              Patient History   Past Medical History:   Diagnosis Date    Dorsalgia, unspecified     Back pain, chronic    Personal history of other diseases of the digestive system     History of esophageal reflux     Past Surgical History:   Procedure Laterality Date    ELBOW SURGERY  10/20/2015    Elbow Surgery    LAPAROSCOPY DIAGNOSTIC / BIOPSY / ASPIRATION / LYSIS  10/20/2015    Exploratory Laparoscopy    OTHER SURGICAL HISTORY  10/20/2015    Salpingectomy For Ectopic Pregnancy     No family history on file.  Social History     Tobacco Use    Smoking status: Some Days     Types: Cigarettes    Smokeless tobacco: Never   Substance Use Topics    Alcohol use: Not on file    Drug use: Not on file       Physical Exam   ED Triage Vitals [11/20/24 1643]   Temperature Heart Rate Respirations BP   36.9 °C (98.4 °F) 88 18 (!) 144/96      Pulse Ox Temp Source Heart Rate Source Patient Position   100 % Oral Monitor Lying      BP Location FiO2 (%)     Right arm --       Physical Exam  Vitals and nursing note reviewed.   Constitutional:       General: She is not in acute distress.     Appearance: Normal  appearance. She is not ill-appearing or toxic-appearing.   HENT:      Head: Normocephalic and atraumatic.      Nose: Nose normal.   Eyes:      Extraocular Movements: Extraocular movements intact.      Pupils: Pupils are equal, round, and reactive to light.   Cardiovascular:      Rate and Rhythm: Normal rate and regular rhythm.      Pulses: Normal pulses.      Heart sounds: Normal heart sounds.   Pulmonary:      Effort: Pulmonary effort is normal.      Breath sounds: Normal breath sounds.   Abdominal:      Palpations: Abdomen is soft.      Tenderness: There is no abdominal tenderness.   Musculoskeletal:         General: Normal range of motion.      Cervical back: Normal range of motion.   Skin:     General: Skin is warm and dry.      Findings: Erythema (Erythematous area noted to the left dorsal wrist surrounding the superficial laceration with no purulent drainage) present.   Neurological:      General: No focal deficit present.      Mental Status: She is alert and oriented to person, place, and time.   Psychiatric:         Mood and Affect: Mood normal.         Behavior: Behavior normal.           ED Course & MDM   Diagnoses as of 11/20/24 1814   Suicidal ideations                 No data recorded     [place] Coma Scale Score: 15 (11/20/24 1644 : Elaina Chairez RN)                           Medical Decision Making  **Disclaimer parts of this chart have been completed using voice recognition software. Please excuse any errors of transcription.     Patient seen in conjunction with attending physician .    HPI: Detailed above.    Exam: A medically appropriate exam performed, outlined above, given the known history and presentation.    History obtained from: Patient    EKG: Reviewed and interpreted by my attending physician    Labs/Diagnostics:  Labs Reviewed   CBC WITH AUTO DIFFERENTIAL - Abnormal       Result Value    WBC 14.4 (*)     nRBC 0.0      RBC 4.61      Hemoglobin 14.0      Hematocrit 41.7      MCV  91      MCH 30.4      MCHC 33.6      RDW 13.6      Platelets 265      Neutrophils % 75.3      Immature Granulocytes %, Automated 0.3      Lymphocytes % 13.6      Monocytes % 9.0      Eosinophils % 1.5      Basophils % 0.3      Neutrophils Absolute 10.81 (*)     Immature Granulocytes Absolute, Automated 0.05      Lymphocytes Absolute 1.96      Monocytes Absolute 1.29 (*)     Eosinophils Absolute 0.22      Basophils Absolute 0.04     COMPREHENSIVE METABOLIC PANEL - Abnormal    Glucose 92      Sodium 136      Potassium 3.9      Chloride 105      Bicarbonate 24      Anion Gap 11      Urea Nitrogen 12      Creatinine 0.86      eGFR 80      Calcium 8.9      Albumin 3.5      Alkaline Phosphatase 74      Total Protein 6.3 (*)     AST 11      Bilirubin, Total 0.2      ALT 11     DRUG SCREEN,URINE - Abnormal    Amphetamine Screen, Urine Presumptive Negative      Barbiturate Screen, Urine Presumptive Negative      Benzodiazepines Screen, Urine Presumptive Negative      Cannabinoid Screen, Urine Presumptive Negative      Cocaine Metabolite Screen, Urine Presumptive Positive (*)     Fentanyl Screen, Urine Presumptive Negative      Opiate Screen, Urine Presumptive Negative      Oxycodone Screen, Urine Presumptive Negative      PCP Screen, Urine Presumptive Negative      Methadone Screen, Urine Presumptive Negative      Narrative:     Drug screen results are presumptive and should not be used to assess   compliance with prescribed medication. Contact the performing [facility] laboratory   to add-on definitive confirmatory testing if clinically indicated.    Toxicology screening results are reported qualitatively. The concentration must   be greater than or equal to the cutoff to be reported as positive. The concentration   at which the screening test can detect an individual drug or metabolite varies.   The absence of expected drug(s) and/or drug metabolite(s) may indicate non-compliance,   inappropriate timing of specimen collection  relative to drug administration, poor drug   absorption, diluted/adulterated urine, or limitations of testing. For medical purposes   only; not valid for forensic use.    Interpretive questions should be directed to the laboratory medical directors.   URINALYSIS WITH REFLEX CULTURE AND MICROSCOPIC - Abnormal    Color, Urine Light-Yellow      Appearance, Urine Clear      Specific Gravity, Urine 1.013      pH, Urine 6.0      Protein, Urine NEGATIVE      Glucose, Urine Normal      Blood, Urine NEGATIVE      Ketones, Urine NEGATIVE      Bilirubin, Urine NEGATIVE      Urobilinogen, Urine Normal      Nitrite, Urine NEGATIVE      Leukocyte Esterase, Urine 250 Krzysztof/µL (*)    MICROSCOPIC ONLY, URINE - Abnormal    WBC, Urine 11-20 (*)     RBC, Urine NONE      Squamous Epithelial Cells, Urine 10-25 (FEW)     ACUTE TOXICOLOGY PANEL, BLOOD - Normal    Acetaminophen <10.0      Salicylate  <3      Alcohol <10     HUMAN CHORIONIC GONADOTROPIN, SERUM QUANTITATIVE - Normal    HCG, Beta-Quantitative <2      Narrative:      Total HCG measurement is performed using the [employer] Access   Immunoassay which detects intact HCG and free beta HCG subunit.    This test is not indicated for use as a tumor marker.   HCG testing is performed using a different test methodology at [facility] than other [facility]. Direct result comparison   should only be made within the same method.       URINE CULTURE   URINALYSIS WITH REFLEX CULTURE AND MICROSCOPIC    Narrative:     The following orders were created for panel order Urinalysis with Reflex Culture and Microscopic.  Procedure                               Abnormality         Status                     ---------                               -----------         ------                     Urinalysis with Reflex C...[[id number]]  Abnormal            Final result               Extra Urine Gray Tube[[id number]]                            In process                   Please view  "results for these tests on the individual orders.   EXTRA URINE GRAY TUBE     EMERGENCY DEPARTMENT COURSE and DIFFERENTIAL DIAGNOSIS/MDM:  Patient is a 53-year-old female presenting to the emergency department for evaluation of suicidal ideations.  On physical exam vital signs remarkable for hypertension but otherwise stable and patient is in no acute distress.  She does admit to suicidal ideation with plan of overdosing on fentanyl.  Patient has a superficial laceration that is healing noted to the dorsal aspect of the left wrist however there is some surrounding erythema and swelling concerning for possible infection.  X-ray of the wrist ordered as well as diagnostic labs and social work consult.  CBC remarkable for leukocytosis.  Acute toxicology panel normal.  CMP showed no electrolyte abnormalities.  Quantitative hCG negative.  Urine drug screen positive for cocaine.  Urine positive for 250 leukocyte esterase but no bacteria with evidence of contamination.  X-ray of the left forearm showed soft tissue swelling with foreign bodies.  At this time patient is medically clear for inpatient placement.  Patient still pending placement at the time of my departure.  Continuation of care as well as final disposition will be determined by attending physician in the emergency department.  Handoff given to attending physician Dr. Kumar.     Vitals:    Vitals:    11/20/24 1643 11/20/24 1644 11/20/24 1647   BP: (!) 144/96  161/89   BP Location: Right arm  Left arm   Patient Position: Lying  Sitting   Pulse: 88  87   Resp: 18  18   Temp: 36.9 °C (98.4 °F)  37 °C (98.6 °F)   TempSrc: Oral  Oral   SpO2: 100% 100% 94%   Weight: 113 kg (250 lb)     Height: 1.676 m (5' 6\")       History Limited by:    None    Independent history obtained from:    None    External records reviewed:    None    Diagnostics interpreted by me:    Xrays - see my independent interpretation in MDM    Discussions with other clinicians:    Social Work " [place]    Chronic conditions impacting care:    None    Social determinants of health affecting care:    None    Diagnostic tests considered but not performed: None    ED Medications managed:    Medications - No data to display    Prescription drugs considered:    None    Screenings:              Procedure  Procedures     Renee Schmitt PA-C  11/20/24 0969

## 2024-11-21 LAB
BACTERIA UR CULT: NORMAL
HOLD SPECIMEN: NORMAL

## 2024-11-21 NOTE — ED PROVIDER NOTES
THIS IS MY MELISSA SUPERVISORY AND SHARED VISIT NOTE:    I personally saw the patient and made/approved the management plan and take responsibility for the patient management.    History: 53-year-old  presented with chief complaint of suicidal ideations, patient states they have had increasing thoughts of harming themselves in the last few days, they are standing on her BUS that her mom who is dead, and states that her plan is to overdose on fentanyl, associated with 2 days ago, and is a drug and alcohol center and they recommend that she get help with her suicidal ideations here patient has cut herself in last few days overall, patient denies any homicidal ideation, denies any other acute complaints at this time.    Exam: GENERAL APPEARANCE: Awake and alert.     HEENT: Normocephalic, atraumatic. Extraocular muscles are intact. Pupils equal round and reactive to light.  CHEST: Nontender to palpation. Clear to auscultation bilaterally. No rales, rhonchi, or wheezing.   HEART: S1, S2. Regular rate and rhythm. No murmurs, gallops or rubs.  Strong and equal pulses in the extremities.   ABDOMEN: Soft,.  non-tender.  No rebound or guarding, bowel sounds normal x 4 quadrants  NEUROLOGICAL: Awake, alert and oriented x 3.   MSK: Wound to lateral aspect of left forearm    MDM: Patient seen and evaluated at bedside, patient is in no acute distress.  I will order a psychiatric evaluation, psychiatric placement labs, x-ray of the forearm. Differential diagnosis includes but is not limited to suicidal ideations, intentional overdose, self-harm.  Patient pending placement at time of my departure from the department, patiently signed out to oncoming clinician..    Please see MELISSA note for further details    Sections of this report were created using voice-to-text technology and may contain errors in translation    Arley Kumar DO  Emergency Medicine       Arley Kumar DO  11/20/24 5099

## 2024-11-24 LAB
ATRIAL RATE: 84 BPM
P AXIS: 60 DEGREES
P OFFSET: 206 MS
P ONSET: 152 MS
PR INTERVAL: 136 MS
Q ONSET: 220 MS
QRS COUNT: 13 BEATS
QRS DURATION: 78 MS
QT INTERVAL: 392 MS
QTC CALCULATION(BAZETT): 463 MS
QTC FREDERICIA: 438 MS
R AXIS: 15 DEGREES
T AXIS: 51 DEGREES
T OFFSET: 416 MS
VENTRICULAR RATE: 84 BPM